# Patient Record
Sex: FEMALE | Race: ASIAN | NOT HISPANIC OR LATINO
[De-identification: names, ages, dates, MRNs, and addresses within clinical notes are randomized per-mention and may not be internally consistent; named-entity substitution may affect disease eponyms.]

---

## 2017-02-27 ENCOUNTER — APPOINTMENT (OUTPATIENT)
Dept: CARDIOTHORACIC SURGERY | Facility: CLINIC | Age: 47
End: 2017-02-27

## 2017-02-27 VITALS
BODY MASS INDEX: 22.76 KG/M2 | RESPIRATION RATE: 15 BRPM | TEMPERATURE: 98.3 F | WEIGHT: 145 LBS | DIASTOLIC BLOOD PRESSURE: 90 MMHG | HEIGHT: 67 IN | OXYGEN SATURATION: 100 % | HEART RATE: 79 BPM | SYSTOLIC BLOOD PRESSURE: 148 MMHG

## 2017-02-27 VITALS — DIASTOLIC BLOOD PRESSURE: 92 MMHG | SYSTOLIC BLOOD PRESSURE: 150 MMHG

## 2017-02-27 DIAGNOSIS — Z86.79 PERSONAL HISTORY OF OTHER DISEASES OF THE CIRCULATORY SYSTEM: ICD-10-CM

## 2017-02-27 DIAGNOSIS — Z82.49 FAMILY HISTORY OF ISCHEMIC HEART DISEASE AND OTHER DISEASES OF THE CIRCULATORY SYSTEM: ICD-10-CM

## 2017-02-27 DIAGNOSIS — I34.2 NONRHEUMATIC MITRAL (VALVE) STENOSIS: ICD-10-CM

## 2017-02-27 DIAGNOSIS — Z80.9 FAMILY HISTORY OF MALIGNANT NEOPLASM, UNSPECIFIED: ICD-10-CM

## 2017-02-27 DIAGNOSIS — Z86.69 PERSONAL HISTORY OF OTHER DISEASES OF THE NERVOUS SYSTEM AND SENSE ORGANS: ICD-10-CM

## 2017-02-27 DIAGNOSIS — Z78.9 OTHER SPECIFIED HEALTH STATUS: ICD-10-CM

## 2017-02-27 DIAGNOSIS — Z82.61 FAMILY HISTORY OF ARTHRITIS: ICD-10-CM

## 2017-11-22 ENCOUNTER — APPOINTMENT (OUTPATIENT)
Dept: CARDIOTHORACIC SURGERY | Facility: CLINIC | Age: 47
End: 2017-11-22
Payer: COMMERCIAL

## 2017-11-29 ENCOUNTER — APPOINTMENT (OUTPATIENT)
Dept: CARDIOTHORACIC SURGERY | Facility: CLINIC | Age: 47
End: 2017-11-29
Payer: COMMERCIAL

## 2017-11-29 VITALS
TEMPERATURE: 98.2 F | RESPIRATION RATE: 13 BRPM | OXYGEN SATURATION: 99 % | HEART RATE: 100 BPM | HEIGHT: 55 IN | SYSTOLIC BLOOD PRESSURE: 166 MMHG | DIASTOLIC BLOOD PRESSURE: 69 MMHG | BODY MASS INDEX: 771.44 KG/M2

## 2017-11-29 VITALS — BODY MASS INDEX: 24.8 KG/M2 | WEIGHT: 158 LBS | HEIGHT: 67 IN

## 2017-11-29 DIAGNOSIS — I05.0 RHEUMATIC MITRAL STENOSIS: ICD-10-CM

## 2017-11-29 PROCEDURE — 99215 OFFICE O/P EST HI 40 MIN: CPT

## 2018-01-22 ENCOUNTER — INPATIENT (INPATIENT)
Facility: HOSPITAL | Age: 48
LOS: 5 days | Discharge: HOME | DRG: 217 | End: 2018-01-28
Attending: THORACIC SURGERY (CARDIOTHORACIC VASCULAR SURGERY) | Admitting: INTERNAL MEDICINE
Payer: COMMERCIAL

## 2018-01-22 VITALS
SYSTOLIC BLOOD PRESSURE: 144 MMHG | HEIGHT: 67 IN | DIASTOLIC BLOOD PRESSURE: 87 MMHG | HEART RATE: 100 BPM | WEIGHT: 138.01 LBS | RESPIRATION RATE: 16 BRPM | OXYGEN SATURATION: 100 %

## 2018-01-22 DIAGNOSIS — I05.1 RHEUMATIC MITRAL INSUFFICIENCY: ICD-10-CM

## 2018-01-22 DIAGNOSIS — Z98.890 OTHER SPECIFIED POSTPROCEDURAL STATES: Chronic | ICD-10-CM

## 2018-01-22 DIAGNOSIS — Z98.89 OTHER SPECIFIED POSTPROCEDURAL STATES: Chronic | ICD-10-CM

## 2018-01-22 DIAGNOSIS — Z01.810 ENCOUNTER FOR PREPROCEDURAL CARDIOVASCULAR EXAMINATION: ICD-10-CM

## 2018-01-22 LAB
ALBUMIN SERPL ELPH-MCNC: 4.4 G/DL — SIGNIFICANT CHANGE UP (ref 3.3–5)
ALP SERPL-CCNC: 73 U/L — SIGNIFICANT CHANGE UP (ref 40–120)
ALT FLD-CCNC: 20 U/L RC — SIGNIFICANT CHANGE UP (ref 10–45)
ANION GAP SERPL CALC-SCNC: 14 MMOL/L — SIGNIFICANT CHANGE UP (ref 5–17)
APTT BLD: 27.8 SEC — SIGNIFICANT CHANGE UP (ref 27.5–37.4)
AST SERPL-CCNC: 26 U/L — SIGNIFICANT CHANGE UP (ref 10–40)
BASOPHILS # BLD AUTO: 0 K/UL — SIGNIFICANT CHANGE UP (ref 0–0.2)
BASOPHILS NFR BLD AUTO: 0 % — SIGNIFICANT CHANGE UP (ref 0–2)
BILIRUB SERPL-MCNC: 0.6 MG/DL — SIGNIFICANT CHANGE UP (ref 0.2–1.2)
BLD GP AB SCN SERPL QL: NEGATIVE — SIGNIFICANT CHANGE UP
BUN SERPL-MCNC: 12 MG/DL — SIGNIFICANT CHANGE UP (ref 7–23)
CALCIUM SERPL-MCNC: 9.6 MG/DL — SIGNIFICANT CHANGE UP (ref 8.4–10.5)
CHLORIDE SERPL-SCNC: 102 MMOL/L — SIGNIFICANT CHANGE UP (ref 96–108)
CHOLEST SERPL-MCNC: 231 MG/DL — HIGH (ref 10–199)
CO2 SERPL-SCNC: 22 MMOL/L — SIGNIFICANT CHANGE UP (ref 22–31)
CREAT SERPL-MCNC: 0.89 MG/DL — SIGNIFICANT CHANGE UP (ref 0.5–1.3)
EOSINOPHIL # BLD AUTO: 0 K/UL — SIGNIFICANT CHANGE UP (ref 0–0.5)
EOSINOPHIL NFR BLD AUTO: 0.1 % — SIGNIFICANT CHANGE UP (ref 0–6)
GLUCOSE SERPL-MCNC: 153 MG/DL — HIGH (ref 70–99)
HBA1C BLD-MCNC: 4.8 % — SIGNIFICANT CHANGE UP (ref 4–5.6)
HCG SERPL-ACNC: <2 MIU/ML — LOW (ref 5–24)
HCT VFR BLD CALC: 40.2 % — SIGNIFICANT CHANGE UP (ref 34.5–45)
HCT VFR BLD CALC: 45.7 % — HIGH (ref 34.5–45)
HDLC SERPL-MCNC: 96 MG/DL — SIGNIFICANT CHANGE UP (ref 40–125)
HGB BLD-MCNC: 13.6 G/DL — SIGNIFICANT CHANGE UP (ref 11.5–15.5)
HGB BLD-MCNC: 15.2 G/DL — SIGNIFICANT CHANGE UP (ref 11.5–15.5)
INR BLD: 0.96 RATIO — SIGNIFICANT CHANGE UP (ref 0.88–1.16)
LIPID PNL WITH DIRECT LDL SERPL: 122 MG/DL — SIGNIFICANT CHANGE UP
LYMPHOCYTES # BLD AUTO: 0.6 K/UL — LOW (ref 1–3.3)
LYMPHOCYTES # BLD AUTO: 5.6 % — LOW (ref 13–44)
MCHC RBC-ENTMCNC: 27.9 PG — SIGNIFICANT CHANGE UP (ref 27–34)
MCHC RBC-ENTMCNC: 28.3 PG — SIGNIFICANT CHANGE UP (ref 27–34)
MCHC RBC-ENTMCNC: 33.3 GM/DL — SIGNIFICANT CHANGE UP (ref 32–36)
MCHC RBC-ENTMCNC: 33.8 GM/DL — SIGNIFICANT CHANGE UP (ref 32–36)
MCV RBC AUTO: 83.7 FL — SIGNIFICANT CHANGE UP (ref 80–100)
MCV RBC AUTO: 83.7 FL — SIGNIFICANT CHANGE UP (ref 80–100)
MONOCYTES # BLD AUTO: 0.1 K/UL — SIGNIFICANT CHANGE UP (ref 0–0.9)
MONOCYTES NFR BLD AUTO: 0.5 % — LOW (ref 2–14)
NEUTROPHILS # BLD AUTO: 9.5 K/UL — HIGH (ref 1.8–7.4)
NEUTROPHILS NFR BLD AUTO: 93.8 % — HIGH (ref 43–77)
NT-PROBNP SERPL-SCNC: 423 PG/ML — HIGH (ref 0–300)
PLATELET # BLD AUTO: 207 K/UL — SIGNIFICANT CHANGE UP (ref 150–400)
PLATELET # BLD AUTO: 230 K/UL — SIGNIFICANT CHANGE UP (ref 150–400)
POTASSIUM SERPL-MCNC: 4.6 MMOL/L — SIGNIFICANT CHANGE UP (ref 3.5–5.3)
POTASSIUM SERPL-SCNC: 4.6 MMOL/L — SIGNIFICANT CHANGE UP (ref 3.5–5.3)
PROT SERPL-MCNC: 8.4 G/DL — HIGH (ref 6–8.3)
PROTHROM AB SERPL-ACNC: 10.4 SEC — SIGNIFICANT CHANGE UP (ref 9.8–12.7)
RBC # BLD: 4.8 M/UL — SIGNIFICANT CHANGE UP (ref 3.8–5.2)
RBC # BLD: 5.46 M/UL — HIGH (ref 3.8–5.2)
RBC # FLD: 12.3 % — SIGNIFICANT CHANGE UP (ref 10.3–14.5)
RBC # FLD: 12.3 % — SIGNIFICANT CHANGE UP (ref 10.3–14.5)
RH IG SCN BLD-IMP: POSITIVE — SIGNIFICANT CHANGE UP
SODIUM SERPL-SCNC: 138 MMOL/L — SIGNIFICANT CHANGE UP (ref 135–145)
T3 SERPL-MCNC: 65 NG/DL — LOW (ref 80–200)
T4 AB SER-ACNC: 6.9 UG/DL — SIGNIFICANT CHANGE UP (ref 4.6–12)
TOTAL CHOLESTEROL/HDL RATIO MEASUREMENT: 2.4 RATIO — LOW (ref 3.3–7.1)
TRIGL SERPL-MCNC: 66 MG/DL — SIGNIFICANT CHANGE UP (ref 10–149)
TSH SERPL-MCNC: 0.79 UIU/ML — SIGNIFICANT CHANGE UP (ref 0.27–4.2)
WBC # BLD: 10.2 K/UL — SIGNIFICANT CHANGE UP (ref 3.8–10.5)
WBC # BLD: 6 K/UL — SIGNIFICANT CHANGE UP (ref 3.8–10.5)
WBC # FLD AUTO: 10.2 K/UL — SIGNIFICANT CHANGE UP (ref 3.8–10.5)
WBC # FLD AUTO: 6 K/UL — SIGNIFICANT CHANGE UP (ref 3.8–10.5)

## 2018-01-22 PROCEDURE — 93010 ELECTROCARDIOGRAM REPORT: CPT

## 2018-01-22 PROCEDURE — 71045 X-RAY EXAM CHEST 1 VIEW: CPT | Mod: 26

## 2018-01-22 PROCEDURE — 93306 TTE W/DOPPLER COMPLETE: CPT | Mod: 26

## 2018-01-22 RX ORDER — CHLORHEXIDINE GLUCONATE 213 G/1000ML
1 SOLUTION TOPICAL ONCE
Qty: 0 | Refills: 0 | Status: COMPLETED | OUTPATIENT
Start: 2018-01-22 | End: 2018-01-22

## 2018-01-22 RX ORDER — CHLORHEXIDINE GLUCONATE 213 G/1000ML
30 SOLUTION TOPICAL ONCE
Qty: 0 | Refills: 0 | Status: COMPLETED | OUTPATIENT
Start: 2018-01-22 | End: 2018-01-23

## 2018-01-22 RX ORDER — DIPHENHYDRAMINE HCL 50 MG
50 CAPSULE ORAL ONCE
Qty: 0 | Refills: 0 | Status: COMPLETED | OUTPATIENT
Start: 2018-01-22 | End: 2018-01-22

## 2018-01-22 RX ORDER — CEFUROXIME AXETIL 250 MG
1500 TABLET ORAL ONCE
Qty: 0 | Refills: 0 | Status: DISCONTINUED | OUTPATIENT
Start: 2018-01-22 | End: 2018-01-23

## 2018-01-22 RX ORDER — DIPHENHYDRAMINE HCL 50 MG
25 CAPSULE ORAL ONCE
Qty: 0 | Refills: 0 | Status: COMPLETED | OUTPATIENT
Start: 2018-01-22 | End: 2018-01-22

## 2018-01-22 RX ADMIN — Medication 50 MILLIGRAM(S): at 09:04

## 2018-01-22 RX ADMIN — CHLORHEXIDINE GLUCONATE 1 APPLICATION(S): 213 SOLUTION TOPICAL at 21:30

## 2018-01-22 RX ADMIN — Medication 25 MILLIGRAM(S): at 21:46

## 2018-01-22 NOTE — PROGRESS NOTE ADULT - SUBJECTIVE AND OBJECTIVE BOX
Cardiac Surgery Pre-op Note:     Surgeon: Dr. Vieyra    Procedure: 1/23/18 re-op MVR    HPI:  48 y/o female with PMHx significant for Rheumatic fever & MR when she was teenager, s/p MVR in 1991 presents today for coronary angiogram for presurgical clearance for scheduled MVR on 1/23/18 with Dr. Vieyra. Patient states she is asymptomatic, denies chest pain, PALACIOS, SOB, dizziness / syncope. Patient had a routine stress test which was not well tolerated. Patient was seen and evaluated by Dr. Vieyra and scheduled for MVR on 1/23/18. (patient is getting admitted after cath for MVR). pt pre treated for IV dye allergy.  Card: Dr. Ehsan Santana     PAST MEDICAL & SURGICAL HISTORY:  TIA (transient ischemic attack): on Aspirin  Rheumatic fever: as a teen, s/p mitral valve repair 1991  H/O right knee surgery  S/P mitral valve repair: 1991    MEDICATIONS  (STANDING):  cefuroxime  IVPB 1500 milliGRAM(s) IV Intermittent once  chlorhexidine 0.12% Liquid 30 milliLiter(s) Swish and Spit once  chlorhexidine 4% Liquid 1 Application(s) Topical once    Vital Signs Last 24 Hrs  HR: 100 (01-22-18 @ 08:00) (100 - 100)  BP: 144/87 (01-22-18 @ 08:00) (144/87 - 144/87)  RR: 16 (01-22-18 @ 08:00) (16 - 16)  SpO2: 100% (01-22-18 @ 08:00) (100% - 100%)  RA          Height (cm): 170.18    Weight (kg): 62.6    BMI (kg/m2): 21.6    (22 Jan 2018 08:00)    Labs:                        15.2   6.0   )-----------( 230      ( 22 Jan 2018 08:15 )             45.7     138  |  102  |  12  ----------------------------<  153<H>  4.6   |  22  |  0.89    TPro  8.4<H>  /  Alb  4.4  /  TBili  0.6  /  DBili  x   /  AST  26  /  ALT  20  /  AlkPhos  73  01-22  PT/INR/PTT - ( 22 Jan 2018 08:15 )   PT: 10.4 sec;   INR: 0.96 ratio    PTT:27.8 sec  ABO Interpretation: A (01-22 @ 08:19)  Thyroid Panel: 01-22 @ 10:29/0.79/ 6.9/65  MRSA:  / MSSA: pending    CXR: pending    PFT's: pending    Echocardiogram: pending    Cardiac catheterization 1/22: normal coronaries    PHYSICAL EXAM:  Neuro: A&Ox3, NAD  Pulm: B/L BS CTA  CV: RRR,+S1S2  Abd: Soft, NT/ND +[id number]  Ext: B/L LE no edema, +PP, no calf tenderness

## 2018-01-22 NOTE — PROGRESS NOTE ADULT - PROBLEM SELECTOR PLAN 1
C/W pre-op cardiac surgery workup.  NPO p MN, chlorhexidine shower.  OR Tuesday 1/23 1st case with Dr. Vieyra.

## 2018-01-22 NOTE — H&P CARDIOLOGY - PMH
Rheumatic fever  as a teen, s/p mitral valve repair 1991 Rheumatic fever  as a teen, s/p mitral valve repair 1991  TIA (transient ischemic attack)  on Aspirin

## 2018-01-22 NOTE — H&P CARDIOLOGY - HISTORY OF PRESENT ILLNESS
46 y/o  female with PMHx of Rheumatic fever s/p MVR in 1991 presents today for coronary angiogram for presurgical clearance of scheduled MVR on 1/23/18 with Dr. Vieyra. Patient states she is asymptomatic, denies chest pain, PALACIOS, SOB, dizziness / syncope. Patient had a routine stress test which was not well tolerated. 46 y/o  female with PMHx significant for Rheumatic fever when she was teenager, s/p MVR in 1991 presents today for coronary angiogram for presurgical clearance for scheduled MVR on 1/23/18 with Dr. Vieyra. Patient states she is asymptomatic, denies chest pain, PALACIOS, SOB, dizziness / syncope. Patient had a routine stress test which was not well tolerated. Patient was seen and evaluated by Dr. Vieyra and scheduled for MVR on 1/23/18. (patient is getting admitted after cath for MVR)  Card: Dr. Ehsan Santana 46 y/o  female with PMHx significant for Rheumatic fever when she was teenager, s/p MVR in 1991 presents today for coronary angiogram for presurgical clearance for scheduled MVR on 1/23/18 with Dr. Vieyra. Patient states she is asymptomatic, denies chest pain, PALACIOS, SOB, dizziness / syncope. Patient had a routine stress test which was not well tolerated. Patient was seen and evaluated by Dr. Vieyra and scheduled for MVR on 1/23/18. (patient is getting admitted after cath for MVR).  pt pre treated for IV dye allergy.  Card: Dr. Ehsan Santana

## 2018-01-23 ENCOUNTER — RESULT REVIEW (OUTPATIENT)
Age: 48
End: 2018-01-23

## 2018-01-23 ENCOUNTER — APPOINTMENT (OUTPATIENT)
Dept: CARDIOTHORACIC SURGERY | Facility: HOSPITAL | Age: 48
End: 2018-01-23
Payer: COMMERCIAL

## 2018-01-23 LAB
ALBUMIN SERPL ELPH-MCNC: 2.8 G/DL — LOW (ref 3.3–5)
ALP SERPL-CCNC: 40 U/L — SIGNIFICANT CHANGE UP (ref 40–120)
ALT FLD-CCNC: 17 U/L RC — SIGNIFICANT CHANGE UP (ref 10–45)
ANION GAP SERPL CALC-SCNC: 15 MMOL/L — SIGNIFICANT CHANGE UP (ref 5–17)
APPEARANCE UR: ABNORMAL
APTT BLD: 26.6 SEC — LOW (ref 27.5–37.4)
AST SERPL-CCNC: 47 U/L — HIGH (ref 10–40)
BACTERIA # UR AUTO: ABNORMAL
BASOPHILS # BLD AUTO: 0 K/UL — SIGNIFICANT CHANGE UP (ref 0–0.2)
BASOPHILS NFR BLD AUTO: 0.1 % — SIGNIFICANT CHANGE UP (ref 0–2)
BILIRUB SERPL-MCNC: 0.9 MG/DL — SIGNIFICANT CHANGE UP (ref 0.2–1.2)
BILIRUB UR-MCNC: NEGATIVE — SIGNIFICANT CHANGE UP
BUN SERPL-MCNC: 13 MG/DL — SIGNIFICANT CHANGE UP (ref 7–23)
CALCIUM SERPL-MCNC: 8 MG/DL — LOW (ref 8.4–10.5)
CHLORIDE SERPL-SCNC: 107 MMOL/L — SIGNIFICANT CHANGE UP (ref 96–108)
CK MB BLD-MCNC: 15.8 % — HIGH (ref 0–3.5)
CK MB CFR SERPL CALC: 48 NG/ML — HIGH (ref 0–3.8)
CK SERPL-CCNC: 304 U/L — HIGH (ref 25–170)
CO2 SERPL-SCNC: 18 MMOL/L — LOW (ref 22–31)
COLOR SPEC: ABNORMAL
COMMENT - URINE: SIGNIFICANT CHANGE UP
CREAT SERPL-MCNC: 0.93 MG/DL — SIGNIFICANT CHANGE UP (ref 0.5–1.3)
DIFF PNL FLD: ABNORMAL
EOSINOPHIL # BLD AUTO: 0 K/UL — SIGNIFICANT CHANGE UP (ref 0–0.5)
EOSINOPHIL NFR BLD AUTO: 0.1 % — SIGNIFICANT CHANGE UP (ref 0–6)
EPI CELLS # UR: 27 /HPF — HIGH (ref 0–5)
FIBRINOGEN PPP-MCNC: 222 MG/DL — LOW (ref 310–510)
GAS PNL BLDA: SIGNIFICANT CHANGE UP
GLUCOSE BLDC GLUCOMTR-MCNC: 128 MG/DL — HIGH (ref 70–99)
GLUCOSE BLDC GLUCOMTR-MCNC: 158 MG/DL — HIGH (ref 70–99)
GLUCOSE SERPL-MCNC: 174 MG/DL — HIGH (ref 70–99)
GLUCOSE UR QL: NEGATIVE MG/DL — SIGNIFICANT CHANGE UP
HCT VFR BLD CALC: 33.2 % — LOW (ref 34.5–45)
HGB BLD-MCNC: 11.4 G/DL — LOW (ref 11.5–15.5)
HYALINE CASTS # UR AUTO: 1 /LPF — SIGNIFICANT CHANGE UP (ref 0–7)
INR BLD: 1.17 RATIO — HIGH (ref 0.88–1.16)
KETONES UR-MCNC: ABNORMAL
LEUKOCYTE ESTERASE UR-ACNC: ABNORMAL
LYMPHOCYTES # BLD AUTO: 0.9 K/UL — LOW (ref 1–3.3)
LYMPHOCYTES # BLD AUTO: 5.6 % — LOW (ref 13–44)
MCHC RBC-ENTMCNC: 28.8 PG — SIGNIFICANT CHANGE UP (ref 27–34)
MCHC RBC-ENTMCNC: 34.5 GM/DL — SIGNIFICANT CHANGE UP (ref 32–36)
MCV RBC AUTO: 83.7 FL — SIGNIFICANT CHANGE UP (ref 80–100)
MONOCYTES # BLD AUTO: 0.7 K/UL — SIGNIFICANT CHANGE UP (ref 0–0.9)
MONOCYTES NFR BLD AUTO: 4.7 % — SIGNIFICANT CHANGE UP (ref 2–14)
MRSA PCR RESULT.: SIGNIFICANT CHANGE UP
NEUTROPHILS # BLD AUTO: 13.7 K/UL — HIGH (ref 1.8–7.4)
NEUTROPHILS NFR BLD AUTO: 89.4 % — HIGH (ref 43–77)
NITRITE UR-MCNC: NEGATIVE — SIGNIFICANT CHANGE UP
PH UR: 6 — SIGNIFICANT CHANGE UP (ref 5–8)
PLATELET # BLD AUTO: 118 K/UL — LOW (ref 150–400)
POTASSIUM SERPL-MCNC: 4.6 MMOL/L — SIGNIFICANT CHANGE UP (ref 3.5–5.3)
POTASSIUM SERPL-SCNC: 4.6 MMOL/L — SIGNIFICANT CHANGE UP (ref 3.5–5.3)
PROT SERPL-MCNC: 5 G/DL — LOW (ref 6–8.3)
PROT UR-MCNC: 30 MG/DL
PROTHROM AB SERPL-ACNC: 12.7 SEC — SIGNIFICANT CHANGE UP (ref 9.8–12.7)
RBC # BLD: 3.97 M/UL — SIGNIFICANT CHANGE UP (ref 3.8–5.2)
RBC # FLD: 12.5 % — SIGNIFICANT CHANGE UP (ref 10.3–14.5)
RBC CASTS # UR COMP ASSIST: 5 /HPF — HIGH (ref 0–4)
S AUREUS DNA NOSE QL NAA+PROBE: DETECTED
SODIUM SERPL-SCNC: 140 MMOL/L — SIGNIFICANT CHANGE UP (ref 135–145)
SP GR SPEC: 1.04 — HIGH (ref 1.01–1.02)
TROPONIN T SERPL-MCNC: 0.42 NG/ML — HIGH (ref 0–0.06)
UROBILINOGEN FLD QL: NEGATIVE MG/DL — SIGNIFICANT CHANGE UP
WBC # BLD: 15.4 K/UL — HIGH (ref 3.8–10.5)
WBC # FLD AUTO: 15.4 K/UL — HIGH (ref 3.8–10.5)
WBC UR QL: 12 /HPF — HIGH (ref 0–5)

## 2018-01-23 PROCEDURE — 33530 CORONARY ARTERY BYPASS/REOP: CPT

## 2018-01-23 PROCEDURE — 93010 ELECTROCARDIOGRAM REPORT: CPT

## 2018-01-23 PROCEDURE — 33530 CORONARY ARTERY BYPASS/REOP: CPT | Mod: AS

## 2018-01-23 PROCEDURE — 94010 BREATHING CAPACITY TEST: CPT | Mod: 26

## 2018-01-23 PROCEDURE — 71045 X-RAY EXAM CHEST 1 VIEW: CPT | Mod: 26

## 2018-01-23 PROCEDURE — 88300 SURGICAL PATH GROSS: CPT | Mod: 26,59

## 2018-01-23 PROCEDURE — 33430 REPLACEMENT OF MITRAL VALVE: CPT | Mod: AS

## 2018-01-23 PROCEDURE — 88305 TISSUE EXAM BY PATHOLOGIST: CPT | Mod: 26

## 2018-01-23 PROCEDURE — 33430 REPLACEMENT OF MITRAL VALVE: CPT

## 2018-01-23 RX ORDER — INSULIN HUMAN 100 [IU]/ML
2 INJECTION, SOLUTION SUBCUTANEOUS
Qty: 100 | Refills: 0 | Status: DISCONTINUED | OUTPATIENT
Start: 2018-01-23 | End: 2018-01-24

## 2018-01-23 RX ORDER — FENTANYL CITRATE 50 UG/ML
25 INJECTION INTRAVENOUS ONCE
Qty: 0 | Refills: 0 | Status: DISCONTINUED | OUTPATIENT
Start: 2018-01-23 | End: 2018-01-24

## 2018-01-23 RX ORDER — ALBUMIN HUMAN 25 %
250 VIAL (ML) INTRAVENOUS ONCE
Qty: 0 | Refills: 0 | Status: COMPLETED | OUTPATIENT
Start: 2018-01-23 | End: 2018-01-23

## 2018-01-23 RX ORDER — POTASSIUM CHLORIDE 20 MEQ
10 PACKET (EA) ORAL
Qty: 0 | Refills: 0 | Status: DISCONTINUED | OUTPATIENT
Start: 2018-01-23 | End: 2018-01-24

## 2018-01-23 RX ORDER — SODIUM BICARBONATE 1 MEQ/ML
50 SYRINGE (ML) INTRAVENOUS ONCE
Qty: 0 | Refills: 0 | Status: COMPLETED | OUTPATIENT
Start: 2018-01-23 | End: 2018-01-23

## 2018-01-23 RX ORDER — MEPERIDINE HYDROCHLORIDE 50 MG/ML
25 INJECTION INTRAMUSCULAR; INTRAVENOUS; SUBCUTANEOUS ONCE
Qty: 0 | Refills: 0 | Status: DISCONTINUED | OUTPATIENT
Start: 2018-01-23 | End: 2018-01-24

## 2018-01-23 RX ORDER — FENTANYL CITRATE 50 UG/ML
25 INJECTION INTRAVENOUS ONCE
Qty: 0 | Refills: 0 | Status: DISCONTINUED | OUTPATIENT
Start: 2018-01-23 | End: 2018-01-23

## 2018-01-23 RX ORDER — PANTOPRAZOLE SODIUM 20 MG/1
40 TABLET, DELAYED RELEASE ORAL DAILY
Qty: 0 | Refills: 0 | Status: DISCONTINUED | OUTPATIENT
Start: 2018-01-23 | End: 2018-01-24

## 2018-01-23 RX ORDER — MUPIROCIN 20 MG/G
1 OINTMENT TOPICAL
Qty: 0 | Refills: 0 | Status: COMPLETED | OUTPATIENT
Start: 2018-01-23 | End: 2018-01-28

## 2018-01-23 RX ORDER — DEXMEDETOMIDINE HYDROCHLORIDE IN 0.9% SODIUM CHLORIDE 4 UG/ML
0.7 INJECTION INTRAVENOUS
Qty: 200 | Refills: 0 | Status: DISCONTINUED | OUTPATIENT
Start: 2018-01-23 | End: 2018-01-24

## 2018-01-23 RX ORDER — ASPIRIN/CALCIUM CARB/MAGNESIUM 324 MG
325 TABLET ORAL DAILY
Qty: 0 | Refills: 0 | Status: DISCONTINUED | OUTPATIENT
Start: 2018-01-24 | End: 2018-01-28

## 2018-01-23 RX ORDER — DOCUSATE SODIUM 100 MG
100 CAPSULE ORAL THREE TIMES A DAY
Qty: 0 | Refills: 0 | Status: DISCONTINUED | OUTPATIENT
Start: 2018-01-23 | End: 2018-01-28

## 2018-01-23 RX ORDER — KETOROLAC TROMETHAMINE 30 MG/ML
15 SYRINGE (ML) INJECTION ONCE
Qty: 0 | Refills: 0 | Status: DISCONTINUED | OUTPATIENT
Start: 2018-01-23 | End: 2018-01-23

## 2018-01-23 RX ORDER — MILRINONE LACTATE 1 MG/ML
0.2 INJECTION, SOLUTION INTRAVENOUS
Qty: 20 | Refills: 0 | Status: DISCONTINUED | OUTPATIENT
Start: 2018-01-23 | End: 2018-01-24

## 2018-01-23 RX ORDER — ONDANSETRON 8 MG/1
4 TABLET, FILM COATED ORAL ONCE
Qty: 0 | Refills: 0 | Status: COMPLETED | OUTPATIENT
Start: 2018-01-23 | End: 2018-01-24

## 2018-01-23 RX ORDER — CEFUROXIME AXETIL 250 MG
1500 TABLET ORAL EVERY 8 HOURS
Qty: 0 | Refills: 0 | Status: COMPLETED | OUTPATIENT
Start: 2018-01-23 | End: 2018-01-25

## 2018-01-23 RX ORDER — POTASSIUM CHLORIDE 20 MEQ
10 PACKET (EA) ORAL
Qty: 0 | Refills: 0 | Status: COMPLETED | OUTPATIENT
Start: 2018-01-23 | End: 2018-01-23

## 2018-01-23 RX ORDER — DEXTROSE 50 % IN WATER 50 %
50 SYRINGE (ML) INTRAVENOUS
Qty: 0 | Refills: 0 | Status: DISCONTINUED | OUTPATIENT
Start: 2018-01-23 | End: 2018-01-28

## 2018-01-23 RX ORDER — POTASSIUM CHLORIDE 20 MEQ
10 PACKET (EA) ORAL ONCE
Qty: 0 | Refills: 0 | Status: DISCONTINUED | OUTPATIENT
Start: 2018-01-23 | End: 2018-01-24

## 2018-01-23 RX ORDER — NICARDIPINE HYDROCHLORIDE 30 MG/1
3 CAPSULE, EXTENDED RELEASE ORAL
Qty: 40 | Refills: 0 | Status: DISCONTINUED | OUTPATIENT
Start: 2018-01-23 | End: 2018-01-24

## 2018-01-23 RX ORDER — HYDROMORPHONE HYDROCHLORIDE 2 MG/ML
1 INJECTION INTRAMUSCULAR; INTRAVENOUS; SUBCUTANEOUS ONCE
Qty: 0 | Refills: 0 | Status: DISCONTINUED | OUTPATIENT
Start: 2018-01-23 | End: 2018-01-23

## 2018-01-23 RX ORDER — DEXTROSE 50 % IN WATER 50 %
25 SYRINGE (ML) INTRAVENOUS
Qty: 0 | Refills: 0 | Status: DISCONTINUED | OUTPATIENT
Start: 2018-01-23 | End: 2018-01-28

## 2018-01-23 RX ORDER — HYDROMORPHONE HYDROCHLORIDE 2 MG/ML
0.5 INJECTION INTRAMUSCULAR; INTRAVENOUS; SUBCUTANEOUS ONCE
Qty: 0 | Refills: 0 | Status: DISCONTINUED | OUTPATIENT
Start: 2018-01-23 | End: 2018-01-23

## 2018-01-23 RX ADMIN — DEXMEDETOMIDINE HYDROCHLORIDE IN 0.9% SODIUM CHLORIDE 10.96 MICROGRAM(S)/KG/HR: 4 INJECTION INTRAVENOUS at 15:45

## 2018-01-23 RX ADMIN — Medication 100 MILLIEQUIVALENT(S): at 15:55

## 2018-01-23 RX ADMIN — MILRINONE LACTATE 3.76 MICROGRAM(S)/KG/MIN: 1 INJECTION, SOLUTION INTRAVENOUS at 15:31

## 2018-01-23 RX ADMIN — FENTANYL CITRATE 25 MICROGRAM(S): 50 INJECTION INTRAVENOUS at 20:45

## 2018-01-23 RX ADMIN — Medication 50 MILLIEQUIVALENT(S): at 19:24

## 2018-01-23 RX ADMIN — CHLORHEXIDINE GLUCONATE 30 MILLILITER(S): 213 SOLUTION TOPICAL at 06:48

## 2018-01-23 RX ADMIN — Medication 100 MILLIEQUIVALENT(S): at 16:00

## 2018-01-23 RX ADMIN — MUPIROCIN 1 APPLICATION(S): 20 OINTMENT TOPICAL at 21:28

## 2018-01-23 RX ADMIN — HYDROMORPHONE HYDROCHLORIDE 1 MILLIGRAM(S): 2 INJECTION INTRAMUSCULAR; INTRAVENOUS; SUBCUTANEOUS at 16:11

## 2018-01-23 RX ADMIN — PANTOPRAZOLE SODIUM 40 MILLIGRAM(S): 20 TABLET, DELAYED RELEASE ORAL at 18:05

## 2018-01-23 RX ADMIN — FENTANYL CITRATE 25 MICROGRAM(S): 50 INJECTION INTRAVENOUS at 20:30

## 2018-01-23 RX ADMIN — INSULIN HUMAN 2 UNIT(S)/HR: 100 INJECTION, SOLUTION SUBCUTANEOUS at 18:00

## 2018-01-23 RX ADMIN — Medication 100 MILLIGRAM(S): at 16:06

## 2018-01-23 RX ADMIN — Medication 125 MILLILITER(S): at 19:26

## 2018-01-23 RX ADMIN — HYDROMORPHONE HYDROCHLORIDE 1 MILLIGRAM(S): 2 INJECTION INTRAMUSCULAR; INTRAVENOUS; SUBCUTANEOUS at 15:56

## 2018-01-23 RX ADMIN — Medication 125 MILLILITER(S): at 21:29

## 2018-01-23 RX ADMIN — Medication 125 MILLILITER(S): at 16:40

## 2018-01-23 NOTE — BRIEF OPERATIVE NOTE - COMMENTS
aortic cross clamp time - 116 minutes  EBL not accurate due to use of cardiotomy and cell saver suction

## 2018-01-23 NOTE — BRIEF OPERATIVE NOTE - PROCEDURE
<<-----Click on this checkbox to enter Procedure Mitral valve replacement  01/23/2018  re-op MVR (bioprosthetic)  Active  [id number]  Sternotomy, repeat  01/23/2018    Active  [id number]

## 2018-01-23 NOTE — AIRWAY REMOVAL NOTE  ADULT & PEDS - ARTIFICAL AIRWAY REMOVAL COMMENTS
Written order for extubation verified. The patient was identified by full name and birth date compared to the identification band. Present during the procedure was MARIIA Lange

## 2018-01-24 DIAGNOSIS — Z95.2 PRESENCE OF PROSTHETIC HEART VALVE: ICD-10-CM

## 2018-01-24 LAB
ALBUMIN SERPL ELPH-MCNC: 3.8 G/DL — SIGNIFICANT CHANGE UP (ref 3.3–5)
ALP SERPL-CCNC: 32 U/L — LOW (ref 40–120)
ALT FLD-CCNC: 15 U/L RC — SIGNIFICANT CHANGE UP (ref 10–45)
ANION GAP SERPL CALC-SCNC: 10 MMOL/L — SIGNIFICANT CHANGE UP (ref 5–17)
ANION GAP SERPL CALC-SCNC: 11 MMOL/L — SIGNIFICANT CHANGE UP (ref 5–17)
AST SERPL-CCNC: 53 U/L — HIGH (ref 10–40)
BILIRUB SERPL-MCNC: 0.9 MG/DL — SIGNIFICANT CHANGE UP (ref 0.2–1.2)
BUN SERPL-MCNC: 14 MG/DL — SIGNIFICANT CHANGE UP (ref 7–23)
BUN SERPL-MCNC: 15 MG/DL — SIGNIFICANT CHANGE UP (ref 7–23)
CALCIUM SERPL-MCNC: 8 MG/DL — LOW (ref 8.4–10.5)
CALCIUM SERPL-MCNC: 8.3 MG/DL — LOW (ref 8.4–10.5)
CHLORIDE SERPL-SCNC: 107 MMOL/L — SIGNIFICANT CHANGE UP (ref 96–108)
CHLORIDE SERPL-SCNC: 98 MMOL/L — SIGNIFICANT CHANGE UP (ref 96–108)
CO2 SERPL-SCNC: 23 MMOL/L — SIGNIFICANT CHANGE UP (ref 22–31)
CO2 SERPL-SCNC: 25 MMOL/L — SIGNIFICANT CHANGE UP (ref 22–31)
CREAT SERPL-MCNC: 0.89 MG/DL — SIGNIFICANT CHANGE UP (ref 0.5–1.3)
CREAT SERPL-MCNC: 1.06 MG/DL — SIGNIFICANT CHANGE UP (ref 0.5–1.3)
GAS PNL BLDA: SIGNIFICANT CHANGE UP
GAS PNL BLDA: SIGNIFICANT CHANGE UP
GLUCOSE BLDC GLUCOMTR-MCNC: 102 MG/DL — HIGH (ref 70–99)
GLUCOSE BLDC GLUCOMTR-MCNC: 104 MG/DL — HIGH (ref 70–99)
GLUCOSE BLDC GLUCOMTR-MCNC: 109 MG/DL — HIGH (ref 70–99)
GLUCOSE BLDC GLUCOMTR-MCNC: 121 MG/DL — HIGH (ref 70–99)
GLUCOSE SERPL-MCNC: 117 MG/DL — HIGH (ref 70–99)
GLUCOSE SERPL-MCNC: 150 MG/DL — HIGH (ref 70–99)
HCT VFR BLD CALC: 28.9 % — LOW (ref 34.5–45)
HGB BLD-MCNC: 9.8 G/DL — LOW (ref 11.5–15.5)
INR BLD: 1.05 RATIO — SIGNIFICANT CHANGE UP (ref 0.88–1.16)
MCHC RBC-ENTMCNC: 28.8 PG — SIGNIFICANT CHANGE UP (ref 27–34)
MCHC RBC-ENTMCNC: 33.8 GM/DL — SIGNIFICANT CHANGE UP (ref 32–36)
MCV RBC AUTO: 85 FL — SIGNIFICANT CHANGE UP (ref 80–100)
PLATELET # BLD AUTO: 100 K/UL — LOW (ref 150–400)
POTASSIUM SERPL-MCNC: 4.6 MMOL/L — SIGNIFICANT CHANGE UP (ref 3.5–5.3)
POTASSIUM SERPL-MCNC: 4.7 MMOL/L — SIGNIFICANT CHANGE UP (ref 3.5–5.3)
POTASSIUM SERPL-SCNC: 4.6 MMOL/L — SIGNIFICANT CHANGE UP (ref 3.5–5.3)
POTASSIUM SERPL-SCNC: 4.7 MMOL/L — SIGNIFICANT CHANGE UP (ref 3.5–5.3)
PROT SERPL-MCNC: 5.8 G/DL — LOW (ref 6–8.3)
PROTHROM AB SERPL-ACNC: 11.4 SEC — SIGNIFICANT CHANGE UP (ref 9.8–12.7)
RBC # BLD: 3.4 M/UL — LOW (ref 3.8–5.2)
RBC # FLD: 12.5 % — SIGNIFICANT CHANGE UP (ref 10.3–14.5)
SODIUM SERPL-SCNC: 133 MMOL/L — LOW (ref 135–145)
SODIUM SERPL-SCNC: 141 MMOL/L — SIGNIFICANT CHANGE UP (ref 135–145)
WBC # BLD: 14.3 K/UL — HIGH (ref 3.8–10.5)
WBC # FLD AUTO: 14.3 K/UL — HIGH (ref 3.8–10.5)

## 2018-01-24 PROCEDURE — 93010 ELECTROCARDIOGRAM REPORT: CPT

## 2018-01-24 PROCEDURE — 71045 X-RAY EXAM CHEST 1 VIEW: CPT | Mod: 26

## 2018-01-24 PROCEDURE — 99291 CRITICAL CARE FIRST HOUR: CPT

## 2018-01-24 RX ORDER — POLYETHYLENE GLYCOL 3350 17 G/17G
17 POWDER, FOR SOLUTION ORAL AT BEDTIME
Qty: 0 | Refills: 0 | Status: DISCONTINUED | OUTPATIENT
Start: 2018-01-24 | End: 2018-01-28

## 2018-01-24 RX ORDER — ACETAMINOPHEN 500 MG
650 TABLET ORAL EVERY 6 HOURS
Qty: 0 | Refills: 0 | Status: DISCONTINUED | OUTPATIENT
Start: 2018-01-24 | End: 2018-01-28

## 2018-01-24 RX ORDER — FENTANYL CITRATE 50 UG/ML
25 INJECTION INTRAVENOUS ONCE
Qty: 0 | Refills: 0 | Status: DISCONTINUED | OUTPATIENT
Start: 2018-01-24 | End: 2018-01-24

## 2018-01-24 RX ORDER — ATORVASTATIN CALCIUM 80 MG/1
40 TABLET, FILM COATED ORAL AT BEDTIME
Qty: 0 | Refills: 0 | Status: DISCONTINUED | OUTPATIENT
Start: 2018-01-24 | End: 2018-01-28

## 2018-01-24 RX ORDER — WARFARIN SODIUM 2.5 MG/1
5 TABLET ORAL ONCE
Qty: 0 | Refills: 0 | Status: COMPLETED | OUTPATIENT
Start: 2018-01-24 | End: 2018-01-24

## 2018-01-24 RX ORDER — METOPROLOL TARTRATE 50 MG
25 TABLET ORAL EVERY 12 HOURS
Qty: 0 | Refills: 0 | Status: DISCONTINUED | OUTPATIENT
Start: 2018-01-24 | End: 2018-01-27

## 2018-01-24 RX ORDER — LIDOCAINE 4 G/100G
1 CREAM TOPICAL DAILY
Qty: 0 | Refills: 0 | Status: DISCONTINUED | OUTPATIENT
Start: 2018-01-24 | End: 2018-01-28

## 2018-01-24 RX ORDER — PANTOPRAZOLE SODIUM 20 MG/1
40 TABLET, DELAYED RELEASE ORAL
Qty: 0 | Refills: 0 | Status: DISCONTINUED | OUTPATIENT
Start: 2018-01-24 | End: 2018-01-28

## 2018-01-24 RX ORDER — OXYCODONE HYDROCHLORIDE 5 MG/1
5 TABLET ORAL EVERY 6 HOURS
Qty: 0 | Refills: 0 | Status: DISCONTINUED | OUTPATIENT
Start: 2018-01-24 | End: 2018-01-27

## 2018-01-24 RX ORDER — ACETAMINOPHEN 500 MG
1000 TABLET ORAL ONCE
Qty: 0 | Refills: 0 | Status: COMPLETED | OUTPATIENT
Start: 2018-01-24 | End: 2018-01-24

## 2018-01-24 RX ORDER — ENOXAPARIN SODIUM 100 MG/ML
40 INJECTION SUBCUTANEOUS DAILY
Qty: 0 | Refills: 0 | Status: DISCONTINUED | OUTPATIENT
Start: 2018-01-24 | End: 2018-01-28

## 2018-01-24 RX ORDER — OXYCODONE HYDROCHLORIDE 5 MG/1
10 TABLET ORAL EVERY 8 HOURS
Qty: 0 | Refills: 0 | Status: DISCONTINUED | OUTPATIENT
Start: 2018-01-24 | End: 2018-01-27

## 2018-01-24 RX ORDER — SODIUM CHLORIDE 9 MG/ML
3 INJECTION INTRAMUSCULAR; INTRAVENOUS; SUBCUTANEOUS EVERY 8 HOURS
Qty: 0 | Refills: 0 | Status: DISCONTINUED | OUTPATIENT
Start: 2018-01-24 | End: 2018-01-28

## 2018-01-24 RX ADMIN — OXYCODONE HYDROCHLORIDE 5 MILLIGRAM(S): 5 TABLET ORAL at 22:45

## 2018-01-24 RX ADMIN — Medication 100 MILLIGRAM(S): at 12:49

## 2018-01-24 RX ADMIN — FENTANYL CITRATE 25 MICROGRAM(S): 50 INJECTION INTRAVENOUS at 23:30

## 2018-01-24 RX ADMIN — Medication 400 MILLIGRAM(S): at 04:00

## 2018-01-24 RX ADMIN — MUPIROCIN 1 APPLICATION(S): 20 OINTMENT TOPICAL at 06:41

## 2018-01-24 RX ADMIN — LIDOCAINE 1 PATCH: 4 CREAM TOPICAL at 20:01

## 2018-01-24 RX ADMIN — MUPIROCIN 1 APPLICATION(S): 20 OINTMENT TOPICAL at 17:04

## 2018-01-24 RX ADMIN — Medication 100 MILLIGRAM(S): at 00:04

## 2018-01-24 RX ADMIN — Medication 100 MILLIGRAM(S): at 17:04

## 2018-01-24 RX ADMIN — SODIUM CHLORIDE 3 MILLILITER(S): 9 INJECTION INTRAMUSCULAR; INTRAVENOUS; SUBCUTANEOUS at 22:08

## 2018-01-24 RX ADMIN — Medication 100 MILLIGRAM(S): at 07:49

## 2018-01-24 RX ADMIN — Medication 1000 MILLIGRAM(S): at 04:15

## 2018-01-24 RX ADMIN — OXYCODONE HYDROCHLORIDE 5 MILLIGRAM(S): 5 TABLET ORAL at 22:15

## 2018-01-24 RX ADMIN — ATORVASTATIN CALCIUM 40 MILLIGRAM(S): 80 TABLET, FILM COATED ORAL at 22:14

## 2018-01-24 RX ADMIN — Medication 650 MILLIGRAM(S): at 22:15

## 2018-01-24 RX ADMIN — Medication 100 MILLIGRAM(S): at 06:41

## 2018-01-24 RX ADMIN — Medication 15 MILLIGRAM(S): at 00:00

## 2018-01-24 RX ADMIN — PANTOPRAZOLE SODIUM 40 MILLIGRAM(S): 20 TABLET, DELAYED RELEASE ORAL at 07:49

## 2018-01-24 RX ADMIN — ONDANSETRON 4 MILLIGRAM(S): 8 TABLET, FILM COATED ORAL at 00:03

## 2018-01-24 RX ADMIN — OXYCODONE HYDROCHLORIDE 5 MILLIGRAM(S): 5 TABLET ORAL at 13:20

## 2018-01-24 RX ADMIN — OXYCODONE HYDROCHLORIDE 10 MILLIGRAM(S): 5 TABLET ORAL at 08:30

## 2018-01-24 RX ADMIN — OXYCODONE HYDROCHLORIDE 10 MILLIGRAM(S): 5 TABLET ORAL at 08:00

## 2018-01-24 RX ADMIN — Medication 25 MILLIGRAM(S): at 12:49

## 2018-01-24 RX ADMIN — Medication 650 MILLIGRAM(S): at 22:45

## 2018-01-24 RX ADMIN — ENOXAPARIN SODIUM 40 MILLIGRAM(S): 100 INJECTION SUBCUTANEOUS at 12:49

## 2018-01-24 RX ADMIN — WARFARIN SODIUM 5 MILLIGRAM(S): 2.5 TABLET ORAL at 22:15

## 2018-01-24 RX ADMIN — FENTANYL CITRATE 25 MICROGRAM(S): 50 INJECTION INTRAVENOUS at 17:00

## 2018-01-24 RX ADMIN — Medication 100 MILLIGRAM(S): at 22:14

## 2018-01-24 RX ADMIN — OXYCODONE HYDROCHLORIDE 5 MILLIGRAM(S): 5 TABLET ORAL at 12:50

## 2018-01-24 RX ADMIN — Medication 325 MILLIGRAM(S): at 12:49

## 2018-01-24 RX ADMIN — OXYCODONE HYDROCHLORIDE 10 MILLIGRAM(S): 5 TABLET ORAL at 20:40

## 2018-01-24 RX ADMIN — Medication 15 MILLIGRAM(S): at 00:15

## 2018-01-24 RX ADMIN — FENTANYL CITRATE 25 MICROGRAM(S): 50 INJECTION INTRAVENOUS at 17:15

## 2018-01-24 RX ADMIN — OXYCODONE HYDROCHLORIDE 10 MILLIGRAM(S): 5 TABLET ORAL at 20:09

## 2018-01-24 NOTE — CONSULT NOTE ADULT - SUBJECTIVE AND OBJECTIVE BOX
CHIEF COMPLAINT: Chest Pain    HPI:  48 y/o  female with PMHx significant for Rheumatic fever when she was teenager, s/p MVR in 1991 presents today for coronary angiogram for presurgical clearance for scheduled MVR on 1/23/18 with Dr. Vieyra. Patient states she is asymptomatic, denies chest pain, PALACIOS, SOB, dizziness / syncope. Patient had a routine stress test which was not well tolerated. Patient was seen and evaluated by Dr. Vieyra and scheduled for MVR on 1/23/18. (patient is getting admitted after cath for MVR). pt pre treated for IV dye allergy. S/p MVR (T) and sitting up in CT ICU. Off ventilator and drips.       PAST MEDICAL & SURGICAL HISTORY:  TIA (transient ischemic attack): on Aspirin  Rheumatic fever: as a teen, s/p mitral valve repair 1991  H/O right knee surgery  S/P mitral valve repair: 1991      Allergies    IV Contrast (Urticaria)    Intolerances      SOCIAL HISTORY    Smoking Hx:  ETOH Hx:  Marital Status:  Occupational Hx:    FAMILY HISTORY:  Family history of acute myocardial infarction (Father)      MEDICATIONS:  acetaminophen   Tablet. 650 milliGRAM(s) Oral every 6 hours PRN  aspirin enteric coated 325 milliGRAM(s) Oral daily  atorvastatin 40 milliGRAM(s) Oral at bedtime  cefuroxime  IVPB 1500 milliGRAM(s) IV Intermittent every 8 hours  dextrose 50% Injectable 50 milliLiter(s) IV Push every 15 minutes  dextrose 50% Injectable 25 milliLiter(s) IV Push every 15 minutes  docusate sodium 100 milliGRAM(s) Oral three times a day  enoxaparin Injectable 40 milliGRAM(s) SubCutaneous daily  mupirocin 2% Ointment 1 Application(s) Topical two times a day  oxyCODONE    IR 5 milliGRAM(s) Oral every 6 hours PRN  oxyCODONE    IR 10 milliGRAM(s) Oral every 8 hours PRN  pantoprazole    Tablet 40 milliGRAM(s) Oral before breakfast  polyethylene glycol 3350 17 Gram(s) Oral at bedtime  warfarin 5 milliGRAM(s) Oral once      REVIEW OF SYSTEMS:    CONSTITUTIONAL: No weakness, fevers or chills  EYES/ENT: No visual changes;  No vertigo or throat pain   NECK: No pain or stiffness  RESPIRATORY: No cough, wheezing, hemoptysis; No shortness of breath  CARDIOVASCULAR: No chest pain or palpitations  GASTROINTESTINAL: No abdominal or epigastric pain. No nausea, vomiting, or hematemesis; No diarrhea or constipation. No melena or hematochezia.  GENITOURINARY: No dysuria, frequency or hematuria  NEUROLOGICAL: No numbness or weakness  SKIN: No itching, burning, rashes, or lesions   All other review of systems is negative unless indicated above    Vital Signs Last 24 Hrs  T(C): 36.9 (24 Jan 2018 03:00), Max: 37.1 (23 Jan 2018 18:00)  T(F): 98.4 (24 Jan 2018 03:00), Max: 98.8 (23 Jan 2018 18:00)  HR: 63 (24 Jan 2018 06:00) (63 - 85)  BP: 145/67 (24 Jan 2018 06:00) (115/57 - 145/67)  BP(mean): 97 (24 Jan 2018 06:00) (80 - 97)  RR: 20 (24 Jan 2018 06:00) (12 - 28)  SpO2: 100% (24 Jan 2018 06:00) (99% - 100%)    I&O's Summary    23 Jan 2018 07:01  -  24 Jan 2018 07:00  --------------------------------------------------------  IN: 1906.4 mL / OUT: 1100 mL / NET: 806.4 mL        PHYSICAL EXAM:    Constitutional: NAD, awake and alert, well-developed  HEENT: PERR, EOMI  Neck: soft and supple, No LAD, No JVD  Respiratory: Breath sounds are clear bilaterally, No wheezing, rales or rhonchi  Cardiovascular: Regular rate and rhythm, normal S1 and S2,  no murmurs, gallops or rubs  Gastrointestinal: Bowel Sounds present, soft, nontender.   Extremities: No peripheral edema. No clubbing or cyanosis.  Vascular: 2+ peripheral pulses  Neurological: A/O x 3, no focal deficits  Musculoskeletal: no calf tenderness.  Skin: No rashes.      LABS: All Labs Reviewed:                        9.8    14.3  )-----------( 100      ( 24 Jan 2018 00:22 )             28.9                         11.4   15.4  )-----------( 118      ( 23 Jan 2018 15:44 )             33.2                         13.6   10.2  )-----------( 207      ( 22 Jan 2018 13:48 )             40.2     24 Jan 2018 00:22    141    |  107    |  14     ----------------------------<  150    4.6     |  23     |  0.89   23 Jan 2018 15:44    140    |  107    |  13     ----------------------------<  174    4.6     |  18     |  0.93   22 Jan 2018 08:14    138    |  102    |  12     ----------------------------<  153    4.6     |  22     |  0.89     Ca    8.0        24 Jan 2018 00:22  Ca    8.0        23 Jan 2018 15:44  Ca    9.6        22 Jan 2018 08:14    TPro  5.8    /  Alb  3.8    /  TBili  0.9    /  DBili  x      /  AST  53     /  ALT  15     /  AlkPhos  32     24 Jan 2018 00:22  TPro  5.0    /  Alb  2.8    /  TBili  0.9    /  DBili  x      /  AST  47     /  ALT  17     /  AlkPhos  40     23 Jan 2018 15:44  TPro  8.4    /  Alb  4.4    /  TBili  0.6    /  DBili  x      /  AST  26     /  ALT  20     /  AlkPhos  73     22 Jan 2018 08:14    PT/INR - ( 24 Jan 2018 00:22 )   PT: 11.4 sec;   INR: 1.05 ratio         PTT - ( 23 Jan 2018 15:44 )  PTT:26.6 sec  Blood Culture:     CARDIAC MARKERS:          RADIOLOGY/EKG:

## 2018-01-24 NOTE — PHYSICAL THERAPY INITIAL EVALUATION ADULT - DISCHARGE DISPOSITION, PT EVAL
home w/ assist/home w/ outpatient services/home/outpatient PT services for gait and balance, endurance training, home with assist as needed from spouse.

## 2018-01-24 NOTE — PHYSICAL THERAPY INITIAL EVALUATION ADULT - ADDITIONAL COMMENTS
as per pt, resides in a PH with spouse, 4 DEONDRE with railings, 11 stairs indoor with railings, PTA, patient (I) with amb, (I) with ADLs.

## 2018-01-24 NOTE — PROGRESS NOTE ADULT - SUBJECTIVE AND OBJECTIVE BOX
HPI:  48 y/o  female with 991 presents today for coronary angiogram for presurgical clearance for scheduled MVR on 1/23/18 with Dr. Vieyra. Patient states she is asymptomatic, denies chest pain, PALACIOS, SOB, dizziness / syncope. Patient had a routine stress test which was not well tolerated. Patient was seen and evaluated by Dr. Vieyra and scheduled for MVR on 1/23/18. (patient is getting admitted after cath for MVR).  pt pre treated for IV dye allergy.  Card: Dr. Ehsan Santana (22 Jan 2018 08:00)  SHARON MCKNIGHT  MRN#:  [id number]    The patient is a 47y Female with PMHx significant for Rheumatic fever s/p MVR in 1991 who was seen, evaluated, & examined with the CTICU staff on rounds, overnight and during the early morning hours with a multidisciplinary care plan formulated & implemented.  All available clinical, laboratory, radiographic, pharmacologic, and electrocardiographic data were reviewed & analyzed.      The patient was in the CTICU in critical condition secondary to persistent cardiopulmonary dysfunction, hemodynamically significant anemia, persistent cardiovascular dysfunction, acidosis, & hyperglycemia.      Respiratory status required supplemental oxygen, the following of ABG’s with A-line monitoring, continuous pulse oximetry monitoring, & an IV Propofol infusion for support & to evaluate for & prevent further decompensation secondary to persistent cardiopulmonary dysfunction.     Invasive hemodynamic monitoring with a central venous and arterial catheter were required for the following of serial CI’s/MVO2’s & continuous MAP/BP monitoring to ensure adequate cardiovascular support and to evaluate for & help prevent decompensation while receiving intermittent volume expansion, external epicardial pacing, blood transfusions, blood product transfusions, and an IV Primacor drip secondary to hemodynamically significant cardiovascular dysfunction and acute postoperative blood loss anemia.    Metabolic stability, uncontrolled type 2 diabetes-hyperglycemia, & infection prophylaxis required an IV regular Insulin drip & the following of serial glucose levels to help achieve & maintain euglycemia.      Patient required more than the usual postoperative critical care management and I provided 35 minutes of non-continuous care to the patient.  Discussed at length with the CTICU staff and helped coordinate care. SHARON MCKNIGHT  MRN#:  [id number]    The patient is a 47y Female with PMHx significant for Rheumatic fever s/p MVR in 1991 with subsequent MS/MR s/p MVR 1/23 who was seen, evaluated, & examined with the CTICU staff on rounds, overnight and during the early morning hours with a multidisciplinary care plan formulated & implemented.  All available clinical, laboratory, radiographic, pharmacologic, and electrocardiographic data were reviewed & analyzed.      The patient was in the CTICU in critical condition secondary to persistent cardiopulmonary dysfunction, hemodynamically significant anemia, persistent cardiovascular dysfunction, acidosis, & hyperglycemia.      Respiratory status required supplemental oxygen, the following of ABG’s with A-line monitoring, and continuous pulse oximetry monitoring for support & to evaluate for & prevent further decompensation secondary to persistent cardiopulmonary dysfunction.     Invasive hemodynamic monitoring with a central venous and arterial catheter were required continuous MAP/BP monitoring to ensure adequate cardiovascular support and to evaluate for & help prevent decompensation while receiving intermittent volume expansion, external epicardial pacing, blood transfusions, blood product transfusions, and an IV Primacor drip secondary to hemodynamically significant cardiovascular dysfunction and acute postoperative blood loss anemia.    Metabolic stability, uncontrolled type 2 diabetes-hyperglycemia, & infection prophylaxis required an IV regular Insulin drip & the following of serial glucose levels to help achieve & maintain euglycemia.      Patient required more than the usual postoperative critical care management and I provided 35 minutes of non-continuous care to the patient.  Discussed at length with the CTICU staff and helped coordinate care.

## 2018-01-24 NOTE — PHYSICAL THERAPY INITIAL EVALUATION ADULT - GENERAL OBSERVATIONS, REHAB EVAL
sitting in chair with NAD, +Cardiac monitor, +3L o2 via NC, +A-line, +IV, +pulse ox, +kolb, +external pacer, +chest tube x2,  present

## 2018-01-24 NOTE — PROGRESS NOTE ADULT - PROBLEM SELECTOR PLAN 1
Coumadin 5mg dosed by INR 1.05   metoprolol 25 bid  Atorvastatin 40  PTT /INR daily  Vac to suction  Left pleural ct -lws  right pleural CT -LWS  AC lovenox 40 daily  ambulate incentive spirometry

## 2018-01-24 NOTE — PROGRESS NOTE ADULT - SUBJECTIVE AND OBJECTIVE BOX
subjective Hello in bed no acute distress    VITAL SIGNS  Telemetry:  NSR 60s  Vital Signs Last 24 Hrs  T(C): 36.8 (01-24-18 @ 20:10), Max: 37.3 (01-24-18 @ 08:00)  T(F): 98.2 (01-24-18 @ 20:10), Max: 99.1 (01-24-18 @ 08:00)  HR: 67 (01-24-18 @ 20:10) (61 - 78)  BP: 122/73 (01-24-18 @ 20:10) (101/73 - 145/67)  RR: 18 (01-24-18 @ 20:10) (13 - 33)  SpO2: 99% (01-24-18 @ 20:10) (99% - 100%)         01-23 @ 07:01  -  01-24 @ 07:00  --------------------------------------------------------  IN: 1906.4 mL / OUT: 1100 mL / NET: 806.4 mL    01-24 @ 07:01  -  01-24 @ 20:34  --------------------------------------------------------  IN: 6.8 mL / OUT: 489 mL / NET: -482.2 mL  weight 62.2 preop    MEDICATIONS  (STANDING):  aspirin enteric coated 325 milliGRAM(s) Oral daily  atorvastatin 40 milliGRAM(s) Oral at bedtime  cefuroxime  IVPB 1500 milliGRAM(s) IV Intermittent every 8 hours  docusate sodium 100 milliGRAM(s) Oral three times a day  enoxaparin Injectable 40 milliGRAM(s) SubCutaneous daily  lidocaine   Patch 1 Patch Transdermal daily  metoprolol     tartrate 25 milliGRAM(s) Oral every 12 hours  mupirocin 2% Ointment 1 Application(s) Topical two times a day  pantoprazole    Tablet 40 milliGRAM(s) Oral before breakfast  polyethylene glycol 3350 17 Gram(s) Oral at bedtime  sodium chloride 0.9% lock flush 3 milliLiter(s) IV Push every 8 hours  warfarin 5 milliGRAM(s) Oral once    MEDICATIONS  (PRN):  acetaminophen   Tablet. 650 milliGRAM(s) Oral every 6 hours PRN Mild Pain (1 - 3)  oxyCODONE    IR 5 milliGRAM(s) Oral every 6 hours PRN Moderate Pain (4 - 6)  oxyCODONE    IR 10 milliGRAM(s) Oral every 8 hours PRN Severe Pain (7 - 10)    POCT Blood Glucose.: 102 mg/dL (24 Jan 2018 05:09)  POCT Blood Glucose.: 109 mg/dL (24 Jan 2018 03:12)  POCT Blood Glucose.: 104 mg/dL (24 Jan 2018 01:59)  POCT Blood Glucose.: 121 mg/dL (24 Jan 2018 01:17)  POCT Blood Glucose.: 128 mg/dL (23 Jan 2018 22:48)          Drains:     MS         [  ] Drainage:                 L Pleural  [ x ]  Drainage:    0/25          R Pleural  [ x ]  Drainage:30/90    Pacing Wires        [ x ]   Settings:    VVI 40 VMA 6                              Isolated  [  ]    Coumadin    [ x] YES          [  ]      NO         Reason:   MVR        PHYSICAL EXAM  Neurology: alert and oriented x 3, nonfocal, no gross deficits  CV :S1 S2 RRR  Sternal Wound : VAC patent  Lungs:CTA right and left pleural CT  Abdomen: soft, nontender, nondistended, positive bowel sounds, last bowel movement 1/22  :      Voids       Extremities: B/L Le warm + DP no calf tenderness negative edema        Physical Therapy Rec:   Home  [  ]   Home w/ PT  [  x]  Rehab  [  ]    Discussed with Cardiothoracic Team at AM rounds.

## 2018-01-24 NOTE — CONSULT NOTE ADULT - ASSESSMENT
47y Female with PMHx significant for Rheumatic fever s/p MVR in 1991 with subsequent MS/MR s/p MVR 1/23  - extubated  - OOB to chair  - off Primacor  - cont present meds  - good progress

## 2018-01-25 LAB
ANION GAP SERPL CALC-SCNC: 9 MMOL/L — SIGNIFICANT CHANGE UP (ref 5–17)
APTT BLD: 23.8 SEC — LOW (ref 27.5–37.4)
BUN SERPL-MCNC: 15 MG/DL — SIGNIFICANT CHANGE UP (ref 7–23)
CALCIUM SERPL-MCNC: 8.2 MG/DL — LOW (ref 8.4–10.5)
CHLORIDE SERPL-SCNC: 99 MMOL/L — SIGNIFICANT CHANGE UP (ref 96–108)
CO2 SERPL-SCNC: 24 MMOL/L — SIGNIFICANT CHANGE UP (ref 22–31)
CREAT SERPL-MCNC: 0.91 MG/DL — SIGNIFICANT CHANGE UP (ref 0.5–1.3)
GLUCOSE SERPL-MCNC: 123 MG/DL — HIGH (ref 70–99)
HCT VFR BLD CALC: 27.5 % — LOW (ref 34.5–45)
HGB BLD-MCNC: 9.2 G/DL — LOW (ref 11.5–15.5)
INR BLD: 0.97 RATIO — SIGNIFICANT CHANGE UP (ref 0.88–1.16)
MCHC RBC-ENTMCNC: 28.4 PG — SIGNIFICANT CHANGE UP (ref 27–34)
MCHC RBC-ENTMCNC: 33.3 GM/DL — SIGNIFICANT CHANGE UP (ref 32–36)
MCV RBC AUTO: 85.2 FL — SIGNIFICANT CHANGE UP (ref 80–100)
PLATELET # BLD AUTO: 93 K/UL — LOW (ref 150–400)
POTASSIUM SERPL-MCNC: 4.7 MMOL/L — SIGNIFICANT CHANGE UP (ref 3.5–5.3)
POTASSIUM SERPL-SCNC: 4.7 MMOL/L — SIGNIFICANT CHANGE UP (ref 3.5–5.3)
PROTHROM AB SERPL-ACNC: 10.6 SEC — SIGNIFICANT CHANGE UP (ref 9.8–12.7)
RBC # BLD: 3.23 M/UL — LOW (ref 3.8–5.2)
RBC # FLD: 12.6 % — SIGNIFICANT CHANGE UP (ref 10.3–14.5)
SODIUM SERPL-SCNC: 132 MMOL/L — LOW (ref 135–145)
WBC # BLD: 13.4 K/UL — HIGH (ref 3.8–10.5)
WBC # FLD AUTO: 13.4 K/UL — HIGH (ref 3.8–10.5)

## 2018-01-25 PROCEDURE — 71045 X-RAY EXAM CHEST 1 VIEW: CPT | Mod: 26,77

## 2018-01-25 PROCEDURE — 71045 X-RAY EXAM CHEST 1 VIEW: CPT | Mod: 26

## 2018-01-25 RX ORDER — WARFARIN SODIUM 2.5 MG/1
5 TABLET ORAL ONCE
Qty: 0 | Refills: 0 | Status: COMPLETED | OUTPATIENT
Start: 2018-01-25 | End: 2018-01-25

## 2018-01-25 RX ADMIN — Medication 100 MILLIGRAM(S): at 06:24

## 2018-01-25 RX ADMIN — MUPIROCIN 1 APPLICATION(S): 20 OINTMENT TOPICAL at 06:24

## 2018-01-25 RX ADMIN — WARFARIN SODIUM 5 MILLIGRAM(S): 2.5 TABLET ORAL at 21:58

## 2018-01-25 RX ADMIN — MUPIROCIN 1 APPLICATION(S): 20 OINTMENT TOPICAL at 17:30

## 2018-01-25 RX ADMIN — LIDOCAINE 1 PATCH: 4 CREAM TOPICAL at 08:37

## 2018-01-25 RX ADMIN — OXYCODONE HYDROCHLORIDE 10 MILLIGRAM(S): 5 TABLET ORAL at 04:40

## 2018-01-25 RX ADMIN — OXYCODONE HYDROCHLORIDE 10 MILLIGRAM(S): 5 TABLET ORAL at 04:10

## 2018-01-25 RX ADMIN — OXYCODONE HYDROCHLORIDE 5 MILLIGRAM(S): 5 TABLET ORAL at 09:20

## 2018-01-25 RX ADMIN — OXYCODONE HYDROCHLORIDE 5 MILLIGRAM(S): 5 TABLET ORAL at 22:40

## 2018-01-25 RX ADMIN — LIDOCAINE 1 PATCH: 4 CREAM TOPICAL at 14:37

## 2018-01-25 RX ADMIN — Medication 100 MILLIGRAM(S): at 21:58

## 2018-01-25 RX ADMIN — SODIUM CHLORIDE 3 MILLILITER(S): 9 INJECTION INTRAMUSCULAR; INTRAVENOUS; SUBCUTANEOUS at 14:32

## 2018-01-25 RX ADMIN — Medication 25 MILLIGRAM(S): at 14:38

## 2018-01-25 RX ADMIN — Medication 25 MILLIGRAM(S): at 01:16

## 2018-01-25 RX ADMIN — Medication 100 MILLIGRAM(S): at 14:37

## 2018-01-25 RX ADMIN — Medication 325 MILLIGRAM(S): at 14:37

## 2018-01-25 RX ADMIN — POLYETHYLENE GLYCOL 3350 17 GRAM(S): 17 POWDER, FOR SOLUTION ORAL at 21:58

## 2018-01-25 RX ADMIN — OXYCODONE HYDROCHLORIDE 5 MILLIGRAM(S): 5 TABLET ORAL at 22:04

## 2018-01-25 RX ADMIN — ENOXAPARIN SODIUM 40 MILLIGRAM(S): 100 INJECTION SUBCUTANEOUS at 14:37

## 2018-01-25 RX ADMIN — PANTOPRAZOLE SODIUM 40 MILLIGRAM(S): 20 TABLET, DELAYED RELEASE ORAL at 06:24

## 2018-01-25 RX ADMIN — OXYCODONE HYDROCHLORIDE 10 MILLIGRAM(S): 5 TABLET ORAL at 15:30

## 2018-01-25 RX ADMIN — OXYCODONE HYDROCHLORIDE 10 MILLIGRAM(S): 5 TABLET ORAL at 14:35

## 2018-01-25 RX ADMIN — OXYCODONE HYDROCHLORIDE 5 MILLIGRAM(S): 5 TABLET ORAL at 10:20

## 2018-01-25 RX ADMIN — ATORVASTATIN CALCIUM 40 MILLIGRAM(S): 80 TABLET, FILM COATED ORAL at 21:58

## 2018-01-25 RX ADMIN — Medication 100 MILLIGRAM(S): at 01:16

## 2018-01-25 RX ADMIN — SODIUM CHLORIDE 3 MILLILITER(S): 9 INJECTION INTRAMUSCULAR; INTRAVENOUS; SUBCUTANEOUS at 21:58

## 2018-01-25 RX ADMIN — SODIUM CHLORIDE 3 MILLILITER(S): 9 INJECTION INTRAMUSCULAR; INTRAVENOUS; SUBCUTANEOUS at 05:15

## 2018-01-25 NOTE — PROGRESS NOTE ADULT - SUBJECTIVE AND OBJECTIVE BOX
SUBJECTIVE:  	  MEDICATIONS:  metoprolol     tartrate 25 milliGRAM(s) Oral every 12 hours        acetaminophen   Tablet. 650 milliGRAM(s) Oral every 6 hours PRN  oxyCODONE    IR 5 milliGRAM(s) Oral every 6 hours PRN  oxyCODONE    IR 10 milliGRAM(s) Oral every 8 hours PRN    docusate sodium 100 milliGRAM(s) Oral three times a day  pantoprazole    Tablet 40 milliGRAM(s) Oral before breakfast  polyethylene glycol 3350 17 Gram(s) Oral at bedtime    atorvastatin 40 milliGRAM(s) Oral at bedtime  dextrose 50% Injectable 50 milliLiter(s) IV Push every 15 minutes  dextrose 50% Injectable 25 milliLiter(s) IV Push every 15 minutes    aspirin enteric coated 325 milliGRAM(s) Oral daily  enoxaparin Injectable 40 milliGRAM(s) SubCutaneous daily  lidocaine   Patch 1 Patch Transdermal daily  mupirocin 2% Ointment 1 Application(s) Topical two times a day  warfarin 5 milliGRAM(s) Oral once      REVIEW OF SYSTEMS:    CONSTITUTIONAL: No fever, weight loss, or fatigue  EYES: No eye pain, visual disturbances, or discharge  NECK: No pain or stiffness  RESPIRATORY: No cough, wheezing, chills or hemoptysis; No Shortness of Breath  CARDIOVASCULAR: No chest pain, palpitations, dizziness, or leg swelling  GASTROINTESTINAL: No abdominal or epigastric pain. No nausea, vomiting, or hematemesis; No diarrhea or constipation. No melena or hematochezia.  GENITOURINARY: No dysuria, frequency, hematuria, or incontinence  NEUROLOGICAL: No headaches, memory loss, loss of strength, numbness, or tremors  SKIN: No itching, burning, rashes, or lesions   LYMPH Nodes: No enlarged glands  MUSCULOSKELETAL: No joint pain or swelling; No muscle, back, or extremity pain  All other review of systems are negative.  	  [ ] Unable to obtain    PHYSICAL EXAM:  T(C): 36.9 (01-25-18 @ 04:40), Max: 37.1 (01-24-18 @ 12:00)  HR: 65 (01-25-18 @ 04:40) (61 - 77)  BP: 152/81 (01-25-18 @ 04:40) (101/73 - 152/81)  RR: 19 (01-25-18 @ 04:40) (17 - 33)  SpO2: 95% (01-25-18 @ 04:40) (95% - 100%)  Wt(kg): --  I&O's Summary    24 Jan 2018 07:01  -  25 Jan 2018 07:00  --------------------------------------------------------  IN: 176.8 mL / OUT: 499 mL / NET: -322.2 mL          PHYSICAL EXAM    Appearance: Normal	  HEENT:   Normal oral mucosa, PERRL, EOMI	  NECK: Soft and supple, No LAD, No JVD  Cardiovascular: Regular Rate and Rhythm, Normal S1 S2, No murmurs, No clicks, gallops or rubs  Respiratory: Lungs clear to auscultation	  Gastrointestinal:  Soft, Non-tender, + BS	  Skin: No rashes, No ecchymoses, No cyanosis  Neurologic: Non-focal  Extremities: No clubbing, cyanosis or edema  Vascular: Peripheral pulses palpable 2+ bilaterally    TELEMETRY: Normal sinus rhythm	    	    LABS:                      9.2    13.4  )-----------( 93       ( 25 Jan 2018 06:11 )             27.5     01-25    132<L>  |  99  |  15  ----------------------------<  123<H>  4.7   |  24  |  0.91    Ca    8.2<L>      25 Jan 2018 06:11    TPro  5.8<L>  /  Alb  3.8  /  TBili  0.9  /  DBili  x   /  AST  53<H>  /  ALT  15  /  AlkPhos  32<L>  01-24

## 2018-01-25 NOTE — PROGRESS NOTE ADULT - PROBLEM SELECTOR PLAN 1
asa/lopressor/statin  DC Chest tubes  OOB amb  increase BB as tolerated   inc spirometry  coumadin dose to INR 2.0-2.5  pain management

## 2018-01-25 NOTE — PROGRESS NOTE ADULT - SUBJECTIVE AND OBJECTIVE BOX
S:  feels well.  CO of incisional pain    Telemetry:  SR 60-70    Vital Signs Last 24 Hrs  T(C): 36.9 (01-25-18 @ 04:40), Max: 37 (01-24-18 @ 16:00)  T(F): 98.5 (01-25-18 @ 04:40), Max: 98.6 (01-24-18 @ 16:00)  HR: 65 (01-25-18 @ 04:40) (61 - 76)  BP: 152/81 (01-25-18 @ 04:40) (101/73 - 152/81)  RR: 19 (01-25-18 @ 04:40) (17 - 31)  SpO2: 95% (01-25-18 @ 04:40) (95% - 100%)                   01-24 @ 07:01  -  01-25 @ 07:00  --------------------------------------------------------  IN: 176.8 mL / OUT: 499 mL / NET: -322.2 mL    01-25 @ 07:01  -  01-25 @ 12:15  --------------------------------------------------------  IN: 0 mL / OUT: 600 mL / NET: -600 mL                Drains:     MS         [  ] Drainage:                 L Pleural  [ x ]  Drainage:  104/24hr no air leak            R Pleural  [ x ]  Drainage: 24/24hr no air leak    Pacing Wires        [ x  ]   Settings:   VVI 50                          Isolated  [  ]    Coumadin    [x] YES          [  ]      NO         Reason:     MVR                            9.2    13.4  )-----------( 93       ( 25 Jan 2018 06:11 )             27.5       01-25    132<L>  |  99  |  15  ----------------------------<  123<H>  4.7   |  24  |  0.91    Ca    8.2<L>      25 Jan 2018 06:11    TPro  5.8<L>  /  Alb  3.8  /  TBili  0.9  /  DBili  x   /  AST  53<H>  /  ALT  15  /  AlkPhos  32<L>  01-24      PT/INR - ( 25 Jan 2018 06:11 )   PT: 10.6 sec;   INR: 0.97 ratio         PTT - ( 25 Jan 2018 06:11 )  PTT:23.8 sec    PHYSICAL EXAM:    Neurology: alert and oriented x 3, nonfocal, no gross deficits    CV :  S1S2 heard RRR No m/r/g    Sternal Wound :  CDI , Stable    Lungs:  clear to ausc    Abdomen: soft, nontender, nondistended, positive bowel sounds, last bowel movement            Extremities:     no edema          acetaminophen   Tablet. 650 milliGRAM(s) Oral every 6 hours PRN  aspirin enteric coated 325 milliGRAM(s) Oral daily  atorvastatin 40 milliGRAM(s) Oral at bedtime  dextrose 50% Injectable 50 milliLiter(s) IV Push every 15 minutes  dextrose 50% Injectable 25 milliLiter(s) IV Push every 15 minutes  docusate sodium 100 milliGRAM(s) Oral three times a day  enoxaparin Injectable 40 milliGRAM(s) SubCutaneous daily  lidocaine   Patch 1 Patch Transdermal daily  metoprolol     tartrate 25 milliGRAM(s) Oral every 12 hours  mupirocin 2% Ointment 1 Application(s) Topical two times a day  oxyCODONE    IR 5 milliGRAM(s) Oral every 6 hours PRN  oxyCODONE    IR 10 milliGRAM(s) Oral every 8 hours PRN  pantoprazole    Tablet 40 milliGRAM(s) Oral before breakfast  polyethylene glycol 3350 17 Gram(s) Oral at bedtime  sodium chloride 0.9% lock flush 3 milliLiter(s) IV Push every 8 hours  warfarin 5 milliGRAM(s) Oral once                              Physical Therapy Rec:   Home  [  ]   Home w/ PT  [  ]  Rehab  [  ]    Discussed with Cardiothoracic Team at AM rounds.

## 2018-01-26 ENCOUNTER — TRANSCRIPTION ENCOUNTER (OUTPATIENT)
Age: 48
End: 2018-01-26

## 2018-01-26 LAB
ANION GAP SERPL CALC-SCNC: 11 MMOL/L — SIGNIFICANT CHANGE UP (ref 5–17)
BUN SERPL-MCNC: 14 MG/DL — SIGNIFICANT CHANGE UP (ref 7–23)
CALCIUM SERPL-MCNC: 8.6 MG/DL — SIGNIFICANT CHANGE UP (ref 8.4–10.5)
CHLORIDE SERPL-SCNC: 102 MMOL/L — SIGNIFICANT CHANGE UP (ref 96–108)
CO2 SERPL-SCNC: 25 MMOL/L — SIGNIFICANT CHANGE UP (ref 22–31)
CREAT SERPL-MCNC: 0.78 MG/DL — SIGNIFICANT CHANGE UP (ref 0.5–1.3)
GLUCOSE SERPL-MCNC: 102 MG/DL — HIGH (ref 70–99)
HCT VFR BLD CALC: 28.3 % — LOW (ref 34.5–45)
HGB BLD-MCNC: 9.6 G/DL — LOW (ref 11.5–15.5)
INR BLD: 1.12 RATIO — SIGNIFICANT CHANGE UP (ref 0.88–1.16)
MCHC RBC-ENTMCNC: 28.9 PG — SIGNIFICANT CHANGE UP (ref 27–34)
MCHC RBC-ENTMCNC: 33.9 GM/DL — SIGNIFICANT CHANGE UP (ref 32–36)
MCV RBC AUTO: 85.3 FL — SIGNIFICANT CHANGE UP (ref 80–100)
PLATELET # BLD AUTO: 111 K/UL — LOW (ref 150–400)
POTASSIUM SERPL-MCNC: 4.4 MMOL/L — SIGNIFICANT CHANGE UP (ref 3.5–5.3)
POTASSIUM SERPL-SCNC: 4.4 MMOL/L — SIGNIFICANT CHANGE UP (ref 3.5–5.3)
PROTHROM AB SERPL-ACNC: 12.1 SEC — SIGNIFICANT CHANGE UP (ref 9.8–12.7)
RBC # BLD: 3.32 M/UL — LOW (ref 3.8–5.2)
RBC # FLD: 12.5 % — SIGNIFICANT CHANGE UP (ref 10.3–14.5)
SODIUM SERPL-SCNC: 138 MMOL/L — SIGNIFICANT CHANGE UP (ref 135–145)
WBC # BLD: 10.8 K/UL — HIGH (ref 3.8–10.5)
WBC # FLD AUTO: 10.8 K/UL — HIGH (ref 3.8–10.5)

## 2018-01-26 PROCEDURE — 71045 X-RAY EXAM CHEST 1 VIEW: CPT | Mod: 26,76

## 2018-01-26 RX ORDER — WARFARIN SODIUM 2.5 MG/1
7.5 TABLET ORAL ONCE
Qty: 0 | Refills: 0 | Status: COMPLETED | OUTPATIENT
Start: 2018-01-26 | End: 2018-01-26

## 2018-01-26 RX ADMIN — OXYCODONE HYDROCHLORIDE 5 MILLIGRAM(S): 5 TABLET ORAL at 13:10

## 2018-01-26 RX ADMIN — MUPIROCIN 1 APPLICATION(S): 20 OINTMENT TOPICAL at 17:01

## 2018-01-26 RX ADMIN — Medication 100 MILLIGRAM(S): at 06:33

## 2018-01-26 RX ADMIN — OXYCODONE HYDROCHLORIDE 5 MILLIGRAM(S): 5 TABLET ORAL at 12:11

## 2018-01-26 RX ADMIN — OXYCODONE HYDROCHLORIDE 10 MILLIGRAM(S): 5 TABLET ORAL at 21:45

## 2018-01-26 RX ADMIN — LIDOCAINE 1 PATCH: 4 CREAM TOPICAL at 02:02

## 2018-01-26 RX ADMIN — SODIUM CHLORIDE 3 MILLILITER(S): 9 INJECTION INTRAMUSCULAR; INTRAVENOUS; SUBCUTANEOUS at 13:50

## 2018-01-26 RX ADMIN — Medication 325 MILLIGRAM(S): at 12:13

## 2018-01-26 RX ADMIN — ENOXAPARIN SODIUM 40 MILLIGRAM(S): 100 INJECTION SUBCUTANEOUS at 12:13

## 2018-01-26 RX ADMIN — MUPIROCIN 1 APPLICATION(S): 20 OINTMENT TOPICAL at 06:33

## 2018-01-26 RX ADMIN — WARFARIN SODIUM 7.5 MILLIGRAM(S): 2.5 TABLET ORAL at 21:26

## 2018-01-26 RX ADMIN — Medication 25 MILLIGRAM(S): at 14:01

## 2018-01-26 RX ADMIN — OXYCODONE HYDROCHLORIDE 10 MILLIGRAM(S): 5 TABLET ORAL at 21:22

## 2018-01-26 RX ADMIN — SODIUM CHLORIDE 3 MILLILITER(S): 9 INJECTION INTRAMUSCULAR; INTRAVENOUS; SUBCUTANEOUS at 06:16

## 2018-01-26 RX ADMIN — Medication 100 MILLIGRAM(S): at 14:01

## 2018-01-26 RX ADMIN — ATORVASTATIN CALCIUM 40 MILLIGRAM(S): 80 TABLET, FILM COATED ORAL at 21:21

## 2018-01-26 RX ADMIN — Medication 100 MILLIGRAM(S): at 21:21

## 2018-01-26 RX ADMIN — SODIUM CHLORIDE 3 MILLILITER(S): 9 INJECTION INTRAMUSCULAR; INTRAVENOUS; SUBCUTANEOUS at 21:27

## 2018-01-26 RX ADMIN — PANTOPRAZOLE SODIUM 40 MILLIGRAM(S): 20 TABLET, DELAYED RELEASE ORAL at 06:33

## 2018-01-26 RX ADMIN — LIDOCAINE 1 PATCH: 4 CREAM TOPICAL at 12:14

## 2018-01-26 NOTE — PROGRESS NOTE ADULT - SUBJECTIVE AND OBJECTIVE BOX
VITAL SIGNS    Telemetry:  rsr 60-70  Vital Signs Last 24 Hrs  T(C): 36.9 (01-26-18 @ 05:24), Max: 37.7 (01-25-18 @ 21:19)  T(F): 98.4 (01-26-18 @ 05:24), Max: 99.9 (01-25-18 @ 21:19)  HR: 63 (01-26-18 @ 05:24) (63 - 70)  BP: 130/74 (01-26-18 @ 05:24) (96/59 - 130/74)  RR: 18 (01-26-18 @ 05:24) (18 - 97)  SpO2: 99% (01-26-18 @ 05:24) (99% - 99%)            01-25 @ 07:01  -  01-26 @ 07:00  --------------------------------------------------------  IN: 980 mL / OUT: 840 mL / NET: 140 mL       Daily     Daily   Admit Wt: Drug Dosing Weight  Height (cm): 170.1 (23 Jan 2018 04:50)  Weight (kg): 62.6 (23 Jan 2018 04:50)  BMI (kg/m2): 21.6 (23 Jan 2018 04:50)  BSA (m2): 1.73 (23 Jan 2018 04:50)      CAPILLARY BLOOD GLUCOSE              acetaminophen   Tablet. 650 milliGRAM(s) Oral every 6 hours PRN  aspirin enteric coated 325 milliGRAM(s) Oral daily  atorvastatin 40 milliGRAM(s) Oral at bedtime  dextrose 50% Injectable 50 milliLiter(s) IV Push every 15 minutes  dextrose 50% Injectable 25 milliLiter(s) IV Push every 15 minutes  docusate sodium 100 milliGRAM(s) Oral three times a day  enoxaparin Injectable 40 milliGRAM(s) SubCutaneous daily  lidocaine   Patch 1 Patch Transdermal daily  metoprolol     tartrate 25 milliGRAM(s) Oral every 12 hours  mupirocin 2% Ointment 1 Application(s) Topical two times a day  oxyCODONE    IR 5 milliGRAM(s) Oral every 6 hours PRN  oxyCODONE    IR 10 milliGRAM(s) Oral every 8 hours PRN  pantoprazole    Tablet 40 milliGRAM(s) Oral before breakfast  polyethylene glycol 3350 17 Gram(s) Oral at bedtime  sodium chloride 0.9% lock flush 3 milliLiter(s) IV Push every 8 hours  warfarin 7.5 milliGRAM(s) Oral once      PHYSICAL EXAM    Subjective: "When can I go home?"   Neurology: alert and oriented x 3, nonfocal, no gross deficits  CV : tele:  RSR 60-70  Sternal Wound :  CDI w/ prevena; +pw   Lungs: clear. RR easy, unlabored   Abdomen: soft, nontender, nondistended, positive bowel sounds,+  bowel movement   Neg N/V/D   :  pt voiding without difficulty   Extremities:   GONZALEZ; neg LE edema, neg calf tenderness.   PPP bilaterally      PW: Isolated   Chest tubes: none

## 2018-01-26 NOTE — DISCHARGE NOTE ADULT - MEDICATION SUMMARY - MEDICATIONS TO TAKE
I will START or STAY ON the medications listed below when I get home from the hospital:    aspirin 325 mg oral delayed release tablet  -- 1 tab(s) by mouth once a day  -- Indication: For anti-platelet    acetaminophen 325 mg oral tablet  -- 2 tab(s) by mouth every 6 hours, As needed, Mild Pain (1 - 3)  -- Indication: For Pain med    atorvastatin 40 mg oral tablet  -- 1 tab(s) by mouth once a day (at bedtime)  -- Indication: For Hi cholesterol    metoprolol succinate 50 mg oral tablet, extended release  -- 1 tab(s) by mouth once a day (at bedtime)  -- Indication: For cardiac medication    docusate sodium 100 mg oral capsule  -- 1 cap(s) by mouth 3 times a day  -- Indication: For Stool softener    polyethylene glycol 3350 oral powder for reconstitution  -- 17 gram(s) by mouth once a day (at bedtime)  -- Indication: For laxative    pantoprazole 40 mg oral delayed release tablet  -- 1 tab(s) by mouth once a day (before a meal)  -- Indication: For Reflux I will START or STAY ON the medications listed below when I get home from the hospital:    aspirin 325 mg oral delayed release tablet  -- 1 tab(s) by mouth once a day  -- Indication: For anti-platelet    acetaminophen 325 mg oral tablet  -- 2 tab(s) by mouth every 6 hours, As needed, Mild Pain (1 - 3)  -- Indication: For Pain med    oxyCODONE 5 mg oral tablet  -- 2 tab(s) by mouth every 6 hours, As Needed MDD:8  -- Caution federal law prohibits the transfer of this drug to any person other  than the person for whom it was prescribed.  It is very important that you take or use this exactly as directed.  Do not skip doses or discontinue unless directed by your doctor.  May cause drowsiness.  Alcohol may intensify this effect.  Use care when operating dangerous machinery.  This prescription cannot be refilled.  Using more of this medication than prescribed may cause serious breathing problems.    -- Indication: For Pain medication    Coumadin 5 mg oral tablet  -- 1 tab(s) by mouth once a day   -- Do not take this drug if you are pregnant.  It is very important that you take or use this exactly as directed.  Do not skip doses or discontinue unless directed by your doctor.  Obtain medical advice before taking any non-prescription drugs as some may affect the action of this medication.    -- Indication: For blood thinner for mitral valve    atorvastatin 40 mg oral tablet  -- 1 tab(s) by mouth once a day (at bedtime)  -- Indication: For Hi cholesterol    metoprolol succinate 50 mg oral tablet, extended release  -- 1 tab(s) by mouth once a day (at bedtime)  -- Indication: For cardiac medication    docusate sodium 100 mg oral capsule  -- 1 cap(s) by mouth 3 times a day  -- Indication: For Stool softener    polyethylene glycol 3350 oral powder for reconstitution  -- 17 gram(s) by mouth once a day (at bedtime)  -- Indication: For laxative    pantoprazole 40 mg oral delayed release tablet  -- 1 tab(s) by mouth once a day (before a meal)  -- Indication: For Reflux

## 2018-01-26 NOTE — DISCHARGE NOTE ADULT - PLAN OF CARE
Reoperation - goal is full recovery follow up appt with dR. Janes Santana, cardiologist in 2-3 weeks  follow up appt with DR. Alarcon, PCP this week - as he will be managing your blood thinning levels  follow up appt with DR. Gage escudero in 2-3 weeks  ambulate  shower   weigh yourself daily

## 2018-01-26 NOTE — DISCHARGE NOTE ADULT - CARE PLAN
Principal Discharge DX:	S/P mitral valve replacement  Goal:	Reoperation - goal is full recovery  Assessment and plan of treatment:	follow up appt with dR. Janes Santana, cardiologist in 2-3 weeks  follow up appt with DR. Alarcon, PCP this week - as he will be managing your blood thinning levels  follow up appt with DR. Gage escudero in 2-3 weeks  ambulate  shower   weigh yourself daily

## 2018-01-26 NOTE — DISCHARGE NOTE ADULT - MEDICATION SUMMARY - MEDICATIONS TO STOP TAKING
I will STOP taking the medications listed below when I get home from the hospital:    Ecotrin Adult Low Strength 81 mg oral delayed release tablet  -- 1 tab(s) by mouth once a day ( last dose 1/13)

## 2018-01-26 NOTE — PROGRESS NOTE ADULT - SUBJECTIVE AND OBJECTIVE BOX
No complaints  /74 HR 63  Lungs clear  RR no murmurs  No edema    INR 1.1  WBC 10.8 Hgb 9.6  Hct 28.3  Plt 111K  BUN 14 Crt 0.78    Imp: Stable from a CV stand point after MVR  Awaiting for INR to be > 2.0

## 2018-01-26 NOTE — DISCHARGE NOTE ADULT - NS AS ACTIVITY OBS
Walking-Outdoors allowed/No Heavy lifting/straining/Showering allowed/Walking-Indoors allowed/Stairs allowed/Sex allowed

## 2018-01-26 NOTE — DISCHARGE NOTE ADULT - HOSPITAL COURSE
PHYSICAL EXAM  Neurology: alert and oriented x 3, nonfocal, no gross deficits  CV : tele:  rsr 60-80/ acc junct 70-80   Sternal Wound :  CDI URIEL; sternum stable   Lungs: clear. RR easy, unlabored   Abdomen: soft, nontender, nondistended, positive bowel sounds, + bowel movement   Neg N/V/D   :  pt voiding without difficulty   Extremities:   GONZALEZ; neg LE edema, neg calf tenderness.   PPP bilaterally

## 2018-01-26 NOTE — PROGRESS NOTE ADULT - PROBLEM SELECTOR PLAN 1
asa/lopressor/statin  D/C prevena and PW today   inc spirometry  coumadin dose to INR 2.0-2.5  pain management  increase activity as tolerated  discharge planning - home when INR therapeutic

## 2018-01-26 NOTE — DISCHARGE NOTE ADULT - OTHER SIGNIFICANT FINDINGS
VSS  Cor: S1S2  Lungs: CTA  MT Inc CDI - stable sternum  Abd: soft nt , nd +BS +BM  ext no edema no calf tenderness

## 2018-01-26 NOTE — DISCHARGE NOTE ADULT - CARE PROVIDERS DIRECT ADDRESSES
[email or web address],DirectAddress_Unknown

## 2018-01-26 NOTE — DISCHARGE NOTE ADULT - CARE PROVIDER_API CALL
Gage Vieyra), Surgery; Thoracic and Cardiac Surgery  [address] [place] [address]  Phone: [phone]  Fax: [phone]    Janes Santana), Cardiovascular Disease; Internal Medicine  [address]  [address]  [place] [address]  Phone: [phone]  Fax: [phone]    Osmany Alarcon), Internal Medicine; Nephrology  [address]  [place] [address]  Phone: [phone]  Fax: [phone]

## 2018-01-27 LAB
ANION GAP SERPL CALC-SCNC: 11 MMOL/L — SIGNIFICANT CHANGE UP (ref 5–17)
BUN SERPL-MCNC: 18 MG/DL — SIGNIFICANT CHANGE UP (ref 7–23)
CALCIUM SERPL-MCNC: 8.8 MG/DL — SIGNIFICANT CHANGE UP (ref 8.4–10.5)
CHLORIDE SERPL-SCNC: 102 MMOL/L — SIGNIFICANT CHANGE UP (ref 96–108)
CO2 SERPL-SCNC: 25 MMOL/L — SIGNIFICANT CHANGE UP (ref 22–31)
CREAT SERPL-MCNC: 0.85 MG/DL — SIGNIFICANT CHANGE UP (ref 0.5–1.3)
GLUCOSE SERPL-MCNC: 116 MG/DL — HIGH (ref 70–99)
HCT VFR BLD CALC: 27.2 % — LOW (ref 34.5–45)
HGB BLD-MCNC: 8.9 G/DL — LOW (ref 11.5–15.5)
INR BLD: 1.38 RATIO — HIGH (ref 0.88–1.16)
MCHC RBC-ENTMCNC: 27.8 PG — SIGNIFICANT CHANGE UP (ref 27–34)
MCHC RBC-ENTMCNC: 32.8 GM/DL — SIGNIFICANT CHANGE UP (ref 32–36)
MCV RBC AUTO: 84.7 FL — SIGNIFICANT CHANGE UP (ref 80–100)
PLATELET # BLD AUTO: 141 K/UL — LOW (ref 150–400)
POTASSIUM SERPL-MCNC: 3.8 MMOL/L — SIGNIFICANT CHANGE UP (ref 3.5–5.3)
POTASSIUM SERPL-SCNC: 3.8 MMOL/L — SIGNIFICANT CHANGE UP (ref 3.5–5.3)
PROTHROM AB SERPL-ACNC: 15.1 SEC — HIGH (ref 9.8–12.7)
RBC # BLD: 3.22 M/UL — LOW (ref 3.8–5.2)
RBC # FLD: 12.1 % — SIGNIFICANT CHANGE UP (ref 10.3–14.5)
SODIUM SERPL-SCNC: 138 MMOL/L — SIGNIFICANT CHANGE UP (ref 135–145)
WBC # BLD: 7.6 K/UL — SIGNIFICANT CHANGE UP (ref 3.8–10.5)
WBC # FLD AUTO: 7.6 K/UL — SIGNIFICANT CHANGE UP (ref 3.8–10.5)

## 2018-01-27 RX ORDER — OXYCODONE HYDROCHLORIDE 5 MG/1
10 TABLET ORAL EVERY 4 HOURS
Qty: 0 | Refills: 0 | Status: DISCONTINUED | OUTPATIENT
Start: 2018-01-27 | End: 2018-01-28

## 2018-01-27 RX ORDER — METOPROLOL TARTRATE 50 MG
50 TABLET ORAL AT BEDTIME
Qty: 0 | Refills: 0 | Status: DISCONTINUED | OUTPATIENT
Start: 2018-01-27 | End: 2018-01-28

## 2018-01-27 RX ORDER — OXYCODONE HYDROCHLORIDE 5 MG/1
5 TABLET ORAL EVERY 4 HOURS
Qty: 0 | Refills: 0 | Status: DISCONTINUED | OUTPATIENT
Start: 2018-01-27 | End: 2018-01-28

## 2018-01-27 RX ORDER — WARFARIN SODIUM 2.5 MG/1
7.5 TABLET ORAL ONCE
Qty: 0 | Refills: 0 | Status: COMPLETED | OUTPATIENT
Start: 2018-01-27 | End: 2018-01-27

## 2018-01-27 RX ORDER — POTASSIUM CHLORIDE 20 MEQ
20 PACKET (EA) ORAL ONCE
Qty: 0 | Refills: 0 | Status: COMPLETED | OUTPATIENT
Start: 2018-01-27 | End: 2018-01-27

## 2018-01-27 RX ADMIN — PANTOPRAZOLE SODIUM 40 MILLIGRAM(S): 20 TABLET, DELAYED RELEASE ORAL at 06:16

## 2018-01-27 RX ADMIN — ATORVASTATIN CALCIUM 40 MILLIGRAM(S): 80 TABLET, FILM COATED ORAL at 21:51

## 2018-01-27 RX ADMIN — OXYCODONE HYDROCHLORIDE 10 MILLIGRAM(S): 5 TABLET ORAL at 02:55

## 2018-01-27 RX ADMIN — SODIUM CHLORIDE 3 MILLILITER(S): 9 INJECTION INTRAMUSCULAR; INTRAVENOUS; SUBCUTANEOUS at 13:41

## 2018-01-27 RX ADMIN — SODIUM CHLORIDE 3 MILLILITER(S): 9 INJECTION INTRAMUSCULAR; INTRAVENOUS; SUBCUTANEOUS at 21:55

## 2018-01-27 RX ADMIN — MUPIROCIN 1 APPLICATION(S): 20 OINTMENT TOPICAL at 05:36

## 2018-01-27 RX ADMIN — Medication 100 MILLIGRAM(S): at 05:36

## 2018-01-27 RX ADMIN — Medication 25 MILLIGRAM(S): at 03:48

## 2018-01-27 RX ADMIN — Medication 325 MILLIGRAM(S): at 11:11

## 2018-01-27 RX ADMIN — Medication 100 MILLIGRAM(S): at 13:42

## 2018-01-27 RX ADMIN — OXYCODONE HYDROCHLORIDE 10 MILLIGRAM(S): 5 TABLET ORAL at 02:23

## 2018-01-27 RX ADMIN — Medication 100 MILLIGRAM(S): at 21:51

## 2018-01-27 RX ADMIN — OXYCODONE HYDROCHLORIDE 10 MILLIGRAM(S): 5 TABLET ORAL at 19:35

## 2018-01-27 RX ADMIN — WARFARIN SODIUM 7.5 MILLIGRAM(S): 2.5 TABLET ORAL at 21:51

## 2018-01-27 RX ADMIN — ENOXAPARIN SODIUM 40 MILLIGRAM(S): 100 INJECTION SUBCUTANEOUS at 11:11

## 2018-01-27 RX ADMIN — LIDOCAINE 1 PATCH: 4 CREAM TOPICAL at 00:00

## 2018-01-27 RX ADMIN — Medication 20 MILLIEQUIVALENT(S): at 09:02

## 2018-01-27 RX ADMIN — MUPIROCIN 1 APPLICATION(S): 20 OINTMENT TOPICAL at 17:00

## 2018-01-27 RX ADMIN — Medication 50 MILLIGRAM(S): at 21:51

## 2018-01-27 RX ADMIN — SODIUM CHLORIDE 3 MILLILITER(S): 9 INJECTION INTRAMUSCULAR; INTRAVENOUS; SUBCUTANEOUS at 05:31

## 2018-01-27 NOTE — PROGRESS NOTE ADULT - SUBJECTIVE AND OBJECTIVE BOX
VITAL SIGNS    Telemetry:  rsr 60-80/ acc junct 70-80   Vital Signs Last 24 Hrs  T(C): 37.1 (18 @ 05:35), Max: 37.1 (18 @ 05:35)  T(F): 98.8 (18 @ 05:35), Max: 98.8 (18 @ 05:35)  HR: 71 (18 @ 05:35) (63 - 71)  BP: 112/70 (18 @ 05:35) (100/64 - 112/70)  RR: 17 (18 @ 05:35) (16 - 18)  SpO2: 96% (18 @ 05:35) (96% - 100%)             @ 07:01  -   @ 07:00  --------------------------------------------------------  IN: 940 mL / OUT: 700 mL / NET: 240 mL       Daily     Daily Weight in k.6 (2018 09:00)  Admit Wt: Drug Dosing Weight  Height (cm): 170.1 (2018 04:50)  Weight (kg): 62.6 (2018 04:50)  BMI (kg/m2): 21.6 (2018 04:50)  BSA (m2): 1.73 (2018 04:50)      CAPILLARY BLOOD GLUCOSE              acetaminophen   Tablet. 650 milliGRAM(s) Oral every 6 hours PRN  aspirin enteric coated 325 milliGRAM(s) Oral daily  atorvastatin 40 milliGRAM(s) Oral at bedtime  dextrose 50% Injectable 50 milliLiter(s) IV Push every 15 minutes  dextrose 50% Injectable 25 milliLiter(s) IV Push every 15 minutes  docusate sodium 100 milliGRAM(s) Oral three times a day  enoxaparin Injectable 40 milliGRAM(s) SubCutaneous daily  lidocaine   Patch 1 Patch Transdermal daily  metoprolol succinate ER 50 milliGRAM(s) Oral at bedtime  mupirocin 2% Ointment 1 Application(s) Topical two times a day  oxyCODONE    IR 5 milliGRAM(s) Oral every 4 hours PRN  oxyCODONE    IR 10 milliGRAM(s) Oral every 4 hours PRN  pantoprazole    Tablet 40 milliGRAM(s) Oral before breakfast  polyethylene glycol 3350 17 Gram(s) Oral at bedtime  sodium chloride 0.9% lock flush 3 milliLiter(s) IV Push every 8 hours  warfarin 7.5 milliGRAM(s) Oral once      PHYSICAL EXAM    Subjective: "I can't leave today?"   Neurology: alert and oriented x 3, nonfocal, no gross deficits  CV : tele:  rsr 60-80/ acc junct 70-80   Sternal Wound :  CDI URIEL; sternum stable   Lungs: clear. RR easy, unlabored   Abdomen: soft, nontender, nondistended, positive bowel sounds, + bowel movement   Neg N/V/D   :  pt voiding without difficulty   Extremities:   GONZALEZ; neg LE edema, neg calf tenderness.   PPP bilaterally      PW: none   Chest tubes: none

## 2018-01-27 NOTE — PROGRESS NOTE ADULT - SUBJECTIVE AND OBJECTIVE BOX
SUBJECTIVE: The patient denies chest pain, shortness of breath, arm pain or jaw pain, dizziness or palpitations.    	  MEDICATIONS:  MEDICATIONS  (STANDING):  aspirin enteric coated 325 milliGRAM(s) Oral daily  atorvastatin 40 milliGRAM(s) Oral at bedtime  dextrose 50% Injectable 50 milliLiter(s) IV Push every 15 minutes  dextrose 50% Injectable 25 milliLiter(s) IV Push every 15 minutes  docusate sodium 100 milliGRAM(s) Oral three times a day  enoxaparin Injectable 40 milliGRAM(s) SubCutaneous daily  lidocaine   Patch 1 Patch Transdermal daily  metoprolol     tartrate 25 milliGRAM(s) Oral every 12 hours  mupirocin 2% Ointment 1 Application(s) Topical two times a day  pantoprazole    Tablet 40 milliGRAM(s) Oral before breakfast  polyethylene glycol 3350 17 Gram(s) Oral at bedtime  sodium chloride 0.9% lock flush 3 milliLiter(s) IV Push every 8 hours    MEDICATIONS  (PRN):  acetaminophen   Tablet. 650 milliGRAM(s) Oral every 6 hours PRN Mild Pain (1 - 3)  oxyCODONE    IR 5 milliGRAM(s) Oral every 4 hours PRN Moderate Pain (4 - 6)  oxyCODONE    IR 10 milliGRAM(s) Oral every 4 hours PRN Severe Pain (7 - 10)        REVIEW OF SYSTEMS:    CONSTITUTIONAL: No fever, weight loss, or fatigue  EYES: No eye pain, visual disturbances, or discharge  NECK: No pain or stiffness  RESPIRATORY: No cough, wheezing, chills or hemoptysis; No Shortness of Breath  CARDIOVASCULAR: No chest pain, palpitations, dizziness, or leg swelling  GASTROINTESTINAL: No abdominal or epigastric pain. No nausea, vomiting, or hematemesis; No diarrhea or constipation. No melena or hematochezia.  GENITOURINARY: No dysuria, frequency, hematuria, or incontinence  NEUROLOGICAL: No headaches, memory loss, loss of strength, numbness, or tremors  SKIN: No itching, burning, rashes, or lesions   LYMPH Nodes: No enlarged glands  MUSCULOSKELETAL: No joint pain or swelling; No muscle, back, or extremity pain  All other review of systems are negative.  	  [ ] Unable to obtain    PHYSICAL EXAM:  T(F): 98.8 (01-27-18 @ 05:35), Max: 98.8 (01-27-18 @ 05:35)  HR: 71 (01-27-18 @ 05:35) (63 - 71)  BP: 112/70 (01-27-18 @ 05:35) (100/64 - 112/70)  RR: 17 (01-27-18 @ 05:35) (16 - 18)  SpO2: 96% (01-27-18 @ 05:35) (96% - 100%)  Wt(kg): --  ,   I&O's Summary    25 Jan 2018 07:01  -  26 Jan 2018 07:00  --------------------------------------------------------  IN: 980 mL / OUT: 840 mL / NET: 140 mL    26 Jan 2018 07:01  -  27 Jan 2018 06:53  --------------------------------------------------------  IN: 940 mL / OUT: 700 mL / NET: 240 mL      PHYSICAL EXAM    Appearance: Normal	  HEENT:   Normal oral mucosa, PERRL, EOMI	  NECK: Soft and supple, No LAD, No JVD  Cardiovascular: Regular Rate and Rhythm, Normal S1 S2, No murmurs, No clicks, gallops or rubs  Respiratory: Lungs clear to auscultation	  Gastrointestinal:  Soft, Non-tender, + BS	  Skin: No rashes, No ecchymoses, No cyanosis  Neurologic: Non-focal  Extremities: No clubbing, cyanosis or edema  Vascular: Peripheral pulses palpable 2+ bilaterally    TELEMETRY: Normal sinus rhythm	    	    LABS:                                 8.9    7.6   )-----------( 141      ( 27 Jan 2018 06:00 )             27.2               01-27    138  |  102  |  18  ----------------------------<  116<H>  3.8   |  25  |  0.85    Ca    8.8      27 Jan 2018 06:00      PT/INR - ( 27 Jan 2018 06:00 )   PT: 15.1 sec;   INR: 1.38 ratio

## 2018-01-27 NOTE — PROGRESS NOTE ADULT - PROBLEM SELECTOR PLAN 1
continue postop care   asa/lopressor/statin  pulm toilet   coumadin dose to INR 2.0-2.5  pain management  increase activity as tolerated  discharge planning - home when INR therapeutic sun? as per Dr. Vieyra

## 2018-01-28 VITALS — WEIGHT: 158.73 LBS

## 2018-01-28 LAB
ANION GAP SERPL CALC-SCNC: 8 MMOL/L — SIGNIFICANT CHANGE UP (ref 5–17)
BUN SERPL-MCNC: 14 MG/DL — SIGNIFICANT CHANGE UP (ref 7–23)
CALCIUM SERPL-MCNC: 8.8 MG/DL — SIGNIFICANT CHANGE UP (ref 8.4–10.5)
CHLORIDE SERPL-SCNC: 104 MMOL/L — SIGNIFICANT CHANGE UP (ref 96–108)
CO2 SERPL-SCNC: 25 MMOL/L — SIGNIFICANT CHANGE UP (ref 22–31)
CREAT SERPL-MCNC: 0.79 MG/DL — SIGNIFICANT CHANGE UP (ref 0.5–1.3)
GLUCOSE SERPL-MCNC: 101 MG/DL — HIGH (ref 70–99)
HCT VFR BLD CALC: 26.9 % — LOW (ref 34.5–45)
HGB BLD-MCNC: 9.2 G/DL — LOW (ref 11.5–15.5)
INR BLD: 2.12 RATIO — HIGH (ref 0.88–1.16)
MCHC RBC-ENTMCNC: 28.7 PG — SIGNIFICANT CHANGE UP (ref 27–34)
MCHC RBC-ENTMCNC: 34.2 GM/DL — SIGNIFICANT CHANGE UP (ref 32–36)
MCV RBC AUTO: 84.1 FL — SIGNIFICANT CHANGE UP (ref 80–100)
PLATELET # BLD AUTO: 176 K/UL — SIGNIFICANT CHANGE UP (ref 150–400)
POTASSIUM SERPL-MCNC: 4.2 MMOL/L — SIGNIFICANT CHANGE UP (ref 3.5–5.3)
POTASSIUM SERPL-SCNC: 4.2 MMOL/L — SIGNIFICANT CHANGE UP (ref 3.5–5.3)
PROTHROM AB SERPL-ACNC: 23.3 SEC — HIGH (ref 9.8–12.7)
RBC # BLD: 3.2 M/UL — LOW (ref 3.8–5.2)
RBC # FLD: 12 % — SIGNIFICANT CHANGE UP (ref 10.3–14.5)
SODIUM SERPL-SCNC: 137 MMOL/L — SIGNIFICANT CHANGE UP (ref 135–145)
WBC # BLD: 6.8 K/UL — SIGNIFICANT CHANGE UP (ref 3.8–10.5)
WBC # FLD AUTO: 6.8 K/UL — SIGNIFICANT CHANGE UP (ref 3.8–10.5)

## 2018-01-28 PROCEDURE — 84443 ASSAY THYROID STIM HORMONE: CPT

## 2018-01-28 PROCEDURE — 82947 ASSAY GLUCOSE BLOOD QUANT: CPT

## 2018-01-28 PROCEDURE — 97116 GAIT TRAINING THERAPY: CPT

## 2018-01-28 PROCEDURE — 87640 STAPH A DNA AMP PROBE: CPT

## 2018-01-28 PROCEDURE — 88300 SURGICAL PATH GROSS: CPT

## 2018-01-28 PROCEDURE — P9016: CPT

## 2018-01-28 PROCEDURE — C1887: CPT

## 2018-01-28 PROCEDURE — 84702 CHORIONIC GONADOTROPIN TEST: CPT

## 2018-01-28 PROCEDURE — 86923 COMPATIBILITY TEST ELECTRIC: CPT

## 2018-01-28 PROCEDURE — 83605 ASSAY OF LACTIC ACID: CPT

## 2018-01-28 PROCEDURE — 86901 BLOOD TYPING SEROLOGIC RH(D): CPT

## 2018-01-28 PROCEDURE — P9047: CPT

## 2018-01-28 PROCEDURE — 85014 HEMATOCRIT: CPT

## 2018-01-28 PROCEDURE — 86900 BLOOD TYPING SEROLOGIC ABO: CPT

## 2018-01-28 PROCEDURE — 82962 GLUCOSE BLOOD TEST: CPT

## 2018-01-28 PROCEDURE — 85384 FIBRINOGEN ACTIVITY: CPT

## 2018-01-28 PROCEDURE — 85027 COMPLETE CBC AUTOMATED: CPT

## 2018-01-28 PROCEDURE — C1889: CPT

## 2018-01-28 PROCEDURE — 82550 ASSAY OF CK (CPK): CPT

## 2018-01-28 PROCEDURE — 84132 ASSAY OF SERUM POTASSIUM: CPT

## 2018-01-28 PROCEDURE — 93460 R&L HRT ART/VENTRICLE ANGIO: CPT

## 2018-01-28 PROCEDURE — 82330 ASSAY OF CALCIUM: CPT

## 2018-01-28 PROCEDURE — 94010 BREATHING CAPACITY TEST: CPT

## 2018-01-28 PROCEDURE — 80053 COMPREHEN METABOLIC PANEL: CPT

## 2018-01-28 PROCEDURE — 80061 LIPID PANEL: CPT

## 2018-01-28 PROCEDURE — P9045: CPT

## 2018-01-28 PROCEDURE — 88305 TISSUE EXAM BY PATHOLOGIST: CPT

## 2018-01-28 PROCEDURE — 84484 ASSAY OF TROPONIN QUANT: CPT

## 2018-01-28 PROCEDURE — 84436 ASSAY OF TOTAL THYROXINE: CPT

## 2018-01-28 PROCEDURE — 97162 PT EVAL MOD COMPLEX 30 MIN: CPT

## 2018-01-28 PROCEDURE — 85730 THROMBOPLASTIN TIME PARTIAL: CPT

## 2018-01-28 PROCEDURE — 82803 BLOOD GASES ANY COMBINATION: CPT

## 2018-01-28 PROCEDURE — 84480 ASSAY TRIIODOTHYRONINE (T3): CPT

## 2018-01-28 PROCEDURE — 86850 RBC ANTIBODY SCREEN: CPT

## 2018-01-28 PROCEDURE — 97110 THERAPEUTIC EXERCISES: CPT

## 2018-01-28 PROCEDURE — 71045 X-RAY EXAM CHEST 1 VIEW: CPT

## 2018-01-28 PROCEDURE — 86891 AUTOLOGOUS BLOOD OP SALVAGE: CPT

## 2018-01-28 PROCEDURE — 93306 TTE W/DOPPLER COMPLETE: CPT

## 2018-01-28 PROCEDURE — 84295 ASSAY OF SERUM SODIUM: CPT

## 2018-01-28 PROCEDURE — 94002 VENT MGMT INPAT INIT DAY: CPT

## 2018-01-28 PROCEDURE — 93005 ELECTROCARDIOGRAM TRACING: CPT

## 2018-01-28 PROCEDURE — C1894: CPT

## 2018-01-28 PROCEDURE — C1769: CPT

## 2018-01-28 PROCEDURE — 83036 HEMOGLOBIN GLYCOSYLATED A1C: CPT

## 2018-01-28 PROCEDURE — 99152 MOD SED SAME PHYS/QHP 5/>YRS: CPT

## 2018-01-28 PROCEDURE — 82435 ASSAY OF BLOOD CHLORIDE: CPT

## 2018-01-28 PROCEDURE — 87641 MR-STAPH DNA AMP PROBE: CPT

## 2018-01-28 PROCEDURE — 85610 PROTHROMBIN TIME: CPT

## 2018-01-28 PROCEDURE — 99153 MOD SED SAME PHYS/QHP EA: CPT

## 2018-01-28 PROCEDURE — 81001 URINALYSIS AUTO W/SCOPE: CPT

## 2018-01-28 PROCEDURE — 83880 ASSAY OF NATRIURETIC PEPTIDE: CPT

## 2018-01-28 PROCEDURE — 82553 CREATINE MB FRACTION: CPT

## 2018-01-28 PROCEDURE — C1751: CPT

## 2018-01-28 PROCEDURE — 80048 BASIC METABOLIC PNL TOTAL CA: CPT

## 2018-01-28 RX ORDER — ACETAMINOPHEN 500 MG
2 TABLET ORAL
Qty: 0 | Refills: 0 | DISCHARGE
Start: 2018-01-28

## 2018-01-28 RX ORDER — ATORVASTATIN CALCIUM 80 MG/1
1 TABLET, FILM COATED ORAL
Qty: 30 | Refills: 0 | OUTPATIENT
Start: 2018-01-28 | End: 2018-02-26

## 2018-01-28 RX ORDER — METOPROLOL TARTRATE 50 MG
1 TABLET ORAL
Qty: 30 | Refills: 0 | OUTPATIENT
Start: 2018-01-28 | End: 2018-02-26

## 2018-01-28 RX ORDER — WARFARIN SODIUM 2.5 MG/1
1 TABLET ORAL
Qty: 30 | Refills: 0 | OUTPATIENT
Start: 2018-01-28 | End: 2018-02-26

## 2018-01-28 RX ORDER — ASPIRIN/CALCIUM CARB/MAGNESIUM 324 MG
1 TABLET ORAL
Qty: 0 | Refills: 0 | COMMUNITY

## 2018-01-28 RX ORDER — ASPIRIN/CALCIUM CARB/MAGNESIUM 324 MG
1 TABLET ORAL
Qty: 30 | Refills: 0 | OUTPATIENT
Start: 2018-01-28 | End: 2018-02-26

## 2018-01-28 RX ORDER — OXYCODONE HYDROCHLORIDE 5 MG/1
2 TABLET ORAL
Qty: 40 | Refills: 0 | OUTPATIENT
Start: 2018-01-28 | End: 2018-02-01

## 2018-01-28 RX ORDER — OXYCODONE HYDROCHLORIDE 5 MG/1
1 TABLET ORAL
Qty: 36 | Refills: 0 | OUTPATIENT
Start: 2018-01-28 | End: 2018-02-02

## 2018-01-28 RX ORDER — POLYETHYLENE GLYCOL 3350 17 G/17G
17 POWDER, FOR SOLUTION ORAL
Qty: 17 | Refills: 0 | OUTPATIENT
Start: 2018-01-28 | End: 2018-02-26

## 2018-01-28 RX ORDER — DOCUSATE SODIUM 100 MG
1 CAPSULE ORAL
Qty: 90 | Refills: 0 | OUTPATIENT
Start: 2018-01-28 | End: 2018-02-26

## 2018-01-28 RX ORDER — PANTOPRAZOLE SODIUM 20 MG/1
1 TABLET, DELAYED RELEASE ORAL
Qty: 30 | Refills: 0 | OUTPATIENT
Start: 2018-01-28 | End: 2018-02-26

## 2018-01-28 RX ADMIN — MUPIROCIN 1 APPLICATION(S): 20 OINTMENT TOPICAL at 05:50

## 2018-01-28 RX ADMIN — OXYCODONE HYDROCHLORIDE 10 MILLIGRAM(S): 5 TABLET ORAL at 04:38

## 2018-01-28 RX ADMIN — Medication 100 MILLIGRAM(S): at 05:49

## 2018-01-28 RX ADMIN — SODIUM CHLORIDE 3 MILLILITER(S): 9 INJECTION INTRAMUSCULAR; INTRAVENOUS; SUBCUTANEOUS at 05:47

## 2018-01-28 RX ADMIN — ENOXAPARIN SODIUM 40 MILLIGRAM(S): 100 INJECTION SUBCUTANEOUS at 11:10

## 2018-01-28 RX ADMIN — Medication 325 MILLIGRAM(S): at 11:10

## 2018-01-28 RX ADMIN — PANTOPRAZOLE SODIUM 40 MILLIGRAM(S): 20 TABLET, DELAYED RELEASE ORAL at 05:50

## 2018-01-28 NOTE — PROGRESS NOTE ADULT - PROVIDER SPECIALTY LIST ADULT
CT Surgery
CTU
Cardiology

## 2018-01-28 NOTE — PROGRESS NOTE ADULT - SUBJECTIVE AND OBJECTIVE BOX
SUBJECTIVE: The patient denies chest pain, shortness of breath, arm pain or jaw pain, dizziness or palpitations.    	  MEDICATIONS:  MEDICATIONS  (STANDING):  aspirin enteric coated 325 milliGRAM(s) Oral daily  atorvastatin 40 milliGRAM(s) Oral at bedtime  dextrose 50% Injectable 50 milliLiter(s) IV Push every 15 minutes  dextrose 50% Injectable 25 milliLiter(s) IV Push every 15 minutes  docusate sodium 100 milliGRAM(s) Oral three times a day  enoxaparin Injectable 40 milliGRAM(s) SubCutaneous daily  lidocaine   Patch 1 Patch Transdermal daily  metoprolol succinate ER 50 milliGRAM(s) Oral at bedtime  pantoprazole    Tablet 40 milliGRAM(s) Oral before breakfast  polyethylene glycol 3350 17 Gram(s) Oral at bedtime  sodium chloride 0.9% lock flush 3 milliLiter(s) IV Push every 8 hours    MEDICATIONS  (PRN):  acetaminophen   Tablet. 650 milliGRAM(s) Oral every 6 hours PRN Mild Pain (1 - 3)  oxyCODONE    IR 5 milliGRAM(s) Oral every 4 hours PRN Moderate Pain (4 - 6)  oxyCODONE    IR 10 milliGRAM(s) Oral every 4 hours PRN Severe Pain (7 - 10)          REVIEW OF SYSTEMS:    CONSTITUTIONAL: No fever, weight loss, or fatigue  EYES: No eye pain, visual disturbances, or discharge  NECK: No pain or stiffness  RESPIRATORY: No cough, wheezing, chills or hemoptysis; No Shortness of Breath  CARDIOVASCULAR: No chest pain, palpitations, dizziness, or leg swelling  GASTROINTESTINAL: No abdominal or epigastric pain. No nausea, vomiting, or hematemesis; No diarrhea or constipation. No melena or hematochezia.  GENITOURINARY: No dysuria, frequency, hematuria, or incontinence  NEUROLOGICAL: No headaches, memory loss, loss of strength, numbness, or tremors  SKIN: No itching, burning, rashes, or lesions   LYMPH Nodes: No enlarged glands  MUSCULOSKELETAL: No joint pain or swelling; No muscle, back, or extremity pain  All other review of systems are negative.  	  [ ] Unable to obtain    PHYSICAL EXAM:  T(F): 98.7 (01-28-18 @ 06:00), Max: 99.1 (01-27-18 @ 20:37)  HR: 64 (01-28-18 @ 06:00) (64 - 72)  BP: 111/67 (01-28-18 @ 06:00) (93/58 - 122/78)  RR: 18 (01-28-18 @ 06:00) (18 - 18)  SpO2: 98% (01-28-18 @ 06:00) (98% - 100%)  Wt(kg): --  ,   I&O's Summary    27 Jan 2018 07:01  -  28 Jan 2018 07:00  --------------------------------------------------------  IN: 860 mL / OUT: 625 mL / NET: 235 mL            PHYSICAL EXAM    Appearance: Normal	  HEENT:   Normal oral mucosa, PERRL, EOMI	  NECK: Soft and supple, No LAD, No JVD  Cardiovascular: Regular Rate and Rhythm, Normal S1 S2, No murmurs, No clicks, gallops or rubs  Respiratory: Lungs clear to auscultation	  Gastrointestinal:  Soft, Non-tender, + BS	  Skin: No rashes, No ecchymoses, No cyanosis  Neurologic: Non-focal  Extremities: No clubbing, cyanosis or edema  Vascular: Peripheral pulses palpable 2+ bilaterally    TELEMETRY: Normal sinus rhythm	    	    LABS:                                 9.2    6.8   )-----------( 176      ( 28 Jan 2018 06:31 )             26.9               01-28    137  |  104  |  14  ----------------------------<  101<H>  4.2   |  25  |  0.79    Ca    8.8      28 Jan 2018 06:31      PT/INR - ( 28 Jan 2018 06:31 )   PT: 23.3 sec;   INR: 2.12 ratio

## 2018-01-28 NOTE — PROGRESS NOTE ADULT - ASSESSMENT
46 y/o  female with PMHx significant for Rheumatic fever when she was teenager, s/p MVR in 1991 now s/p Mitral valve replacement  01/23/2018  re-op MVR (bioprosthetic)     Patient doing well post op.  Incentive spirometry  Ambulate in hallway  Await INR to be greater than 2.  Dose coumadin today.  Continue current medications
46 y/o  female with PMHx significant for Rheumatic fever when she was teenager, s/p MVR in 1991 now s/p Mitral valve replacement  01/23/2018  re-op MVR (bioprosthetic)     Patient doing well post op.  Incentive spirometry  Ambulate in hallway  INR is therapeutic at 2.12  Continue current medications  Should follow up in our office when discharged with an INR check on 1/31/18
46 y/o  female with PMHx significant for Rheumatic fever when she was teenager, s/p MVR in 1991 now s/p Mitral valve replacement  01/23/2018  re-op MVR (bioprosthetic)     Patient doing well early post op.  Incentive spirometry  Ambulate in hallway  Continue current medications
46 y/o  female with PMHx significant for Rheumatic fever when she was teenager, s/p MVR in 1991 underwent cardiac cath on1/22 revealed  clean cors .MSSA+ on bactroban  1/23/18 Redo  Mitral valve replacement  01/23/2018  re-op MVR (t).post op course includes fluid resuscitation with albumin Precedex gtt for agitation insulin gtt for hyperglycemia primacor for inotropic support .  Extubated 1/23. Stabilized  GTTs weaned off.   1/24 mediastinal chest tube d/c kolb D/C pt voiding seen by PT ambulated  Coumadin 5 for mitral valve  transferred to [address] [place] [address] CT midsternal Vac dressing in place patent - disposition to home with PT .
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 Vieyra.
46 yo F POD #2  Reop MVR.  HD stable.  Will D/C Chest tubes today.  Dose coumadin to INR 2.0-2.5.  Isolate wires
47F with PMHx significant for Rheumatic fever when she was teenager, s/p MVR in 1991 presents today for coronary angiogram for presurgical clearance for scheduled MVR on 1/23/18 with Dr. Vieyra. Patient states she is asymptomatic, denies chest pain, PALACIOS, SOB, dizziness / syncope. Patient had a routine stress test which was not well tolerated. Patient was seen and evaluated by Dr. Vieyra and scheduled for MVR on 1/23/18. (patient is getting admitted after cath for MVR).  pt pre treated for IV dye allergy.  pt now s/p 1/23/18 Reop MVR- bioprosthetic valve.  postop course unremarkable  1/26 VSS; anticoagulation with coumadin INR 1.1 today- give coumadin 7.5 mg dennis; Dr. Alarcon to follow INR levels as an outpatient  Discharge planning- home when INR therapeutic
47F with PMHx significant for Rheumatic fever when she was teenager, s/p MVR in 1991 presents today for coronary angiogram for presurgical clearance for scheduled MVR on 1/23/18 with Dr. Vieyra. Patient states she is asymptomatic, denies chest pain, PALACIOS, SOB, dizziness / syncope. Patient had a routine stress test which was not well tolerated. Patient was seen and evaluated by Dr. Vieyra and scheduled for MVR on 1/23/18. (patient is getting admitted after cath for MVR).  pt pre treated for IV dye allergy.  pt now s/p 1/23/18 Reop MVR- bioprosthetic valve.  postop course unremarkable  1/26 VSS; anticoagulation with coumadin INR 1.1 today- give coumadin 7.5 mg tonight; Dr. Alarcon to follow INR levels as an outpatient  1/27 INR 1.3 today- coumadin 7.5 mg tonight; VSS   Discharge planning- home when INR therapeutic sun?

## 2018-02-14 ENCOUNTER — APPOINTMENT (OUTPATIENT)
Dept: CARDIOTHORACIC SURGERY | Facility: CLINIC | Age: 48
End: 2018-02-14
Payer: COMMERCIAL

## 2018-02-14 VITALS
TEMPERATURE: 98.5 F | HEART RATE: 75 BPM | WEIGHT: 155 LBS | OXYGEN SATURATION: 100 % | BODY MASS INDEX: 24.33 KG/M2 | SYSTOLIC BLOOD PRESSURE: 131 MMHG | RESPIRATION RATE: 15 BRPM | HEIGHT: 67 IN | DIASTOLIC BLOOD PRESSURE: 85 MMHG

## 2018-02-14 PROCEDURE — 99024 POSTOP FOLLOW-UP VISIT: CPT

## 2018-02-14 RX ORDER — OXYCODONE HYDROCHLORIDE 5 MG/1
5 CAPSULE ORAL
Qty: 30 | Refills: 0 | Status: DISCONTINUED | OUTPATIENT
End: 2018-02-14

## 2018-02-14 RX ORDER — ATORVASTATIN CALCIUM 40 MG/1
40 TABLET, FILM COATED ORAL
Refills: 0 | Status: DISCONTINUED | COMMUNITY
End: 2018-02-14

## 2018-02-14 RX ORDER — PANTOPRAZOLE SODIUM 40 MG/1
40 TABLET, DELAYED RELEASE ORAL
Qty: 30 | Refills: 0 | Status: DISCONTINUED | COMMUNITY
End: 2018-02-14

## 2018-02-15 ENCOUNTER — APPOINTMENT (OUTPATIENT)
Dept: CARDIOTHORACIC SURGERY | Facility: CLINIC | Age: 48
End: 2018-02-15
Payer: COMMERCIAL

## 2018-02-26 ENCOUNTER — INPATIENT (INPATIENT)
Facility: HOSPITAL | Age: 48
LOS: 1 days | Discharge: HOME | DRG: 310 | End: 2018-02-28
Attending: THORACIC SURGERY (CARDIOTHORACIC VASCULAR SURGERY) | Admitting: THORACIC SURGERY (CARDIOTHORACIC VASCULAR SURGERY)
Payer: COMMERCIAL

## 2018-02-26 VITALS — HEART RATE: 140 BPM

## 2018-02-26 DIAGNOSIS — I48.91 UNSPECIFIED ATRIAL FIBRILLATION: ICD-10-CM

## 2018-02-26 DIAGNOSIS — Z98.89 OTHER SPECIFIED POSTPROCEDURAL STATES: Chronic | ICD-10-CM

## 2018-02-26 DIAGNOSIS — Z95.2 PRESENCE OF PROSTHETIC HEART VALVE: ICD-10-CM

## 2018-02-26 DIAGNOSIS — Z95.2 PRESENCE OF PROSTHETIC HEART VALVE: Chronic | ICD-10-CM

## 2018-02-26 DIAGNOSIS — Z98.890 OTHER SPECIFIED POSTPROCEDURAL STATES: Chronic | ICD-10-CM

## 2018-02-26 LAB
ALBUMIN SERPL ELPH-MCNC: 4.1 G/DL — SIGNIFICANT CHANGE UP (ref 3.3–5)
ALP SERPL-CCNC: 77 U/L — SIGNIFICANT CHANGE UP (ref 40–120)
ALT FLD-CCNC: 16 U/L RC — SIGNIFICANT CHANGE UP (ref 10–45)
ANION GAP SERPL CALC-SCNC: 15 MMOL/L — SIGNIFICANT CHANGE UP (ref 5–17)
APTT BLD: 37.4 SEC — SIGNIFICANT CHANGE UP (ref 27.5–37.4)
AST SERPL-CCNC: 36 U/L — SIGNIFICANT CHANGE UP (ref 10–40)
BASOPHILS # BLD AUTO: 0 K/UL — SIGNIFICANT CHANGE UP (ref 0–0.2)
BASOPHILS NFR BLD AUTO: 0.2 % — SIGNIFICANT CHANGE UP (ref 0–2)
BILIRUB SERPL-MCNC: 0.3 MG/DL — SIGNIFICANT CHANGE UP (ref 0.2–1.2)
BUN SERPL-MCNC: 12 MG/DL — SIGNIFICANT CHANGE UP (ref 7–23)
CALCIUM SERPL-MCNC: 9.9 MG/DL — SIGNIFICANT CHANGE UP (ref 8.4–10.5)
CHLORIDE SERPL-SCNC: 102 MMOL/L — SIGNIFICANT CHANGE UP (ref 96–108)
CK MB CFR SERPL CALC: 1.1 NG/ML — SIGNIFICANT CHANGE UP (ref 0–3.8)
CK SERPL-CCNC: 55 U/L — SIGNIFICANT CHANGE UP (ref 25–170)
CO2 SERPL-SCNC: 20 MMOL/L — LOW (ref 22–31)
CREAT SERPL-MCNC: 0.92 MG/DL — SIGNIFICANT CHANGE UP (ref 0.5–1.3)
D DIMER BLD IA.RAPID-MCNC: 461 NG/ML DDU — HIGH
EOSINOPHIL # BLD AUTO: 0.3 K/UL — SIGNIFICANT CHANGE UP (ref 0–0.5)
EOSINOPHIL NFR BLD AUTO: 3.9 % — SIGNIFICANT CHANGE UP (ref 0–6)
GLUCOSE SERPL-MCNC: 102 MG/DL — HIGH (ref 70–99)
HCG SERPL-ACNC: <2 MIU/ML — LOW (ref 5–24)
HCT VFR BLD CALC: 37.9 % — SIGNIFICANT CHANGE UP (ref 34.5–45)
HGB BLD-MCNC: 12.2 G/DL — SIGNIFICANT CHANGE UP (ref 11.5–15.5)
INR BLD: 2.04 RATIO — HIGH (ref 0.88–1.16)
LYMPHOCYTES # BLD AUTO: 1.5 K/UL — SIGNIFICANT CHANGE UP (ref 1–3.3)
LYMPHOCYTES # BLD AUTO: 17.9 % — SIGNIFICANT CHANGE UP (ref 13–44)
MAGNESIUM SERPL-MCNC: 1.9 MG/DL — SIGNIFICANT CHANGE UP (ref 1.6–2.6)
MCHC RBC-ENTMCNC: 24.8 PG — LOW (ref 27–34)
MCHC RBC-ENTMCNC: 32.3 GM/DL — SIGNIFICANT CHANGE UP (ref 32–36)
MCV RBC AUTO: 76.9 FL — LOW (ref 80–100)
MONOCYTES # BLD AUTO: 0.5 K/UL — SIGNIFICANT CHANGE UP (ref 0–0.9)
MONOCYTES NFR BLD AUTO: 6.3 % — SIGNIFICANT CHANGE UP (ref 2–14)
NEUTROPHILS # BLD AUTO: 6 K/UL — SIGNIFICANT CHANGE UP (ref 1.8–7.4)
NEUTROPHILS NFR BLD AUTO: 71.7 % — SIGNIFICANT CHANGE UP (ref 43–77)
PHOSPHATE SERPL-MCNC: 4.1 MG/DL — SIGNIFICANT CHANGE UP (ref 2.5–4.5)
PLATELET # BLD AUTO: 325 K/UL — SIGNIFICANT CHANGE UP (ref 150–400)
POTASSIUM SERPL-MCNC: 5.3 MMOL/L — SIGNIFICANT CHANGE UP (ref 3.5–5.3)
POTASSIUM SERPL-SCNC: 5.3 MMOL/L — SIGNIFICANT CHANGE UP (ref 3.5–5.3)
PROT SERPL-MCNC: 8.3 G/DL — SIGNIFICANT CHANGE UP (ref 6–8.3)
PROTHROM AB SERPL-ACNC: 22.6 SEC — HIGH (ref 9.8–12.7)
RBC # BLD: 4.93 M/UL — SIGNIFICANT CHANGE UP (ref 3.8–5.2)
RBC # FLD: 15 % — HIGH (ref 10.3–14.5)
SODIUM SERPL-SCNC: 137 MMOL/L — SIGNIFICANT CHANGE UP (ref 135–145)
TROPONIN T SERPL-MCNC: 0.02 NG/ML — SIGNIFICANT CHANGE UP (ref 0–0.06)
WBC # BLD: 8.4 K/UL — SIGNIFICANT CHANGE UP (ref 3.8–10.5)
WBC # FLD AUTO: 8.4 K/UL — SIGNIFICANT CHANGE UP (ref 3.8–10.5)

## 2018-02-26 PROCEDURE — 93010 ELECTROCARDIOGRAM REPORT: CPT

## 2018-02-26 PROCEDURE — 99223 1ST HOSP IP/OBS HIGH 75: CPT | Mod: GC

## 2018-02-26 PROCEDURE — 93306 TTE W/DOPPLER COMPLETE: CPT | Mod: 26

## 2018-02-26 PROCEDURE — 71045 X-RAY EXAM CHEST 1 VIEW: CPT | Mod: 26

## 2018-02-26 PROCEDURE — 99285 EMERGENCY DEPT VISIT HI MDM: CPT | Mod: 25

## 2018-02-26 RX ORDER — SODIUM CHLORIDE 9 MG/ML
3 INJECTION INTRAMUSCULAR; INTRAVENOUS; SUBCUTANEOUS EVERY 8 HOURS
Qty: 0 | Refills: 0 | Status: DISCONTINUED | OUTPATIENT
Start: 2018-02-26 | End: 2018-02-28

## 2018-02-26 RX ORDER — HYDROCORTISONE 20 MG
200 TABLET ORAL ONCE
Qty: 0 | Refills: 0 | Status: COMPLETED | OUTPATIENT
Start: 2018-02-26 | End: 2018-02-26

## 2018-02-26 RX ORDER — HYDROCORTISONE 20 MG
200 TABLET ORAL ONCE
Qty: 0 | Refills: 0 | Status: DISCONTINUED | OUTPATIENT
Start: 2018-02-26 | End: 2018-02-26

## 2018-02-26 RX ORDER — DOCUSATE SODIUM 100 MG
100 CAPSULE ORAL THREE TIMES A DAY
Qty: 0 | Refills: 0 | Status: DISCONTINUED | OUTPATIENT
Start: 2018-02-26 | End: 2018-02-28

## 2018-02-26 RX ORDER — DIPHENHYDRAMINE HCL 50 MG
50 CAPSULE ORAL ONCE
Qty: 0 | Refills: 0 | Status: COMPLETED | OUTPATIENT
Start: 2018-02-26 | End: 2018-02-26

## 2018-02-26 RX ORDER — METOPROLOL TARTRATE 50 MG
50 TABLET ORAL
Qty: 0 | Refills: 0 | Status: DISCONTINUED | OUTPATIENT
Start: 2018-02-26 | End: 2018-02-28

## 2018-02-26 RX ORDER — WARFARIN SODIUM 2.5 MG/1
5 TABLET ORAL ONCE
Qty: 0 | Refills: 0 | Status: COMPLETED | OUTPATIENT
Start: 2018-02-26 | End: 2018-02-26

## 2018-02-26 RX ORDER — ATORVASTATIN CALCIUM 80 MG/1
40 TABLET, FILM COATED ORAL AT BEDTIME
Qty: 0 | Refills: 0 | Status: DISCONTINUED | OUTPATIENT
Start: 2018-02-26 | End: 2018-02-28

## 2018-02-26 RX ORDER — OXYCODONE HYDROCHLORIDE 5 MG/1
5 TABLET ORAL EVERY 4 HOURS
Qty: 0 | Refills: 0 | Status: DISCONTINUED | OUTPATIENT
Start: 2018-02-26 | End: 2018-02-28

## 2018-02-26 RX ORDER — METOPROLOL TARTRATE 50 MG
5 TABLET ORAL ONCE
Qty: 0 | Refills: 0 | Status: COMPLETED | OUTPATIENT
Start: 2018-02-26 | End: 2018-02-26

## 2018-02-26 RX ORDER — SODIUM CHLORIDE 9 MG/ML
1000 INJECTION INTRAMUSCULAR; INTRAVENOUS; SUBCUTANEOUS ONCE
Qty: 0 | Refills: 0 | Status: COMPLETED | OUTPATIENT
Start: 2018-02-26 | End: 2018-02-26

## 2018-02-26 RX ORDER — AMIODARONE HYDROCHLORIDE 400 MG/1
200 TABLET ORAL DAILY
Qty: 0 | Refills: 0 | Status: CANCELLED | OUTPATIENT
Start: 2018-03-03 | End: 2018-02-28

## 2018-02-26 RX ORDER — POLYETHYLENE GLYCOL 3350 17 G/17G
17 POWDER, FOR SOLUTION ORAL AT BEDTIME
Qty: 0 | Refills: 0 | Status: DISCONTINUED | OUTPATIENT
Start: 2018-02-26 | End: 2018-02-28

## 2018-02-26 RX ORDER — AMIODARONE HYDROCHLORIDE 400 MG/1
400 TABLET ORAL THREE TIMES A DAY
Qty: 0 | Refills: 0 | Status: DISCONTINUED | OUTPATIENT
Start: 2018-02-26 | End: 2018-02-28

## 2018-02-26 RX ORDER — AMIODARONE HYDROCHLORIDE 400 MG/1
400 TABLET ORAL THREE TIMES A DAY
Qty: 0 | Refills: 0 | Status: DISCONTINUED | OUTPATIENT
Start: 2018-02-26 | End: 2018-02-26

## 2018-02-26 RX ORDER — PANTOPRAZOLE SODIUM 20 MG/1
40 TABLET, DELAYED RELEASE ORAL
Qty: 0 | Refills: 0 | Status: DISCONTINUED | OUTPATIENT
Start: 2018-02-26 | End: 2018-02-28

## 2018-02-26 RX ORDER — ASPIRIN/CALCIUM CARB/MAGNESIUM 324 MG
81 TABLET ORAL DAILY
Qty: 0 | Refills: 0 | Status: DISCONTINUED | OUTPATIENT
Start: 2018-02-26 | End: 2018-02-28

## 2018-02-26 RX ADMIN — Medication 50 MILLIGRAM(S): at 23:10

## 2018-02-26 RX ADMIN — AMIODARONE HYDROCHLORIDE 400 MILLIGRAM(S): 400 TABLET ORAL at 20:51

## 2018-02-26 RX ADMIN — SODIUM CHLORIDE 1000 MILLILITER(S): 9 INJECTION INTRAMUSCULAR; INTRAVENOUS; SUBCUTANEOUS at 17:37

## 2018-02-26 RX ADMIN — SODIUM CHLORIDE 3 MILLILITER(S): 9 INJECTION INTRAMUSCULAR; INTRAVENOUS; SUBCUTANEOUS at 23:34

## 2018-02-26 RX ADMIN — Medication 50 MILLIGRAM(S): at 20:57

## 2018-02-26 RX ADMIN — Medication 200 MILLIGRAM(S): at 19:52

## 2018-02-26 RX ADMIN — Medication 5 MILLIGRAM(S): at 19:30

## 2018-02-26 NOTE — ED PROVIDER NOTE - MEDICAL DECISION MAKING DETAILS
Hx of CVA 10 yrs ago w/ no residual deficit, MVR 6 wks ago, recent episodes of dizziness and syncope 3 days ago, p/w palpitations, found to be in A-fib at PCP's office, given metoprolol 10mg IV, also was presyncopal today. Plan: Labs, ecg, CTA to r/o PE, reassess.

## 2018-02-26 NOTE — H&P ADULT - REASON FOR ADMISSION
"I have worsening palpitation and fainted  3 days ago" "I have worsening palpitation and fainted x 3 days ago"

## 2018-02-26 NOTE — H&P ADULT - NSHPLABSRESULTS_GEN_ALL_CORE
LABS:                        12.2   8.4   )-----------( 325      ( 26 Feb 2018 17:41 )             37.9     02-26    137  |  102  |  12  ----------------------------<  102<H>  5.3   |  20<L>  |  0.92    Ca    9.9      26 Feb 2018 17:41  Phos  4.1     02-26  Mg     1.9     02-26    TPro  8.3  /  Alb  4.1  /  TBili  0.3  /  DBili  x   /  AST  36  /  ALT  16  /  AlkPhos  77  02-26    PT/INR - ( 26 Feb 2018 17:41 )   PT: 22.6 sec;   INR: 2.04 ratio         PTT - ( 26 Feb 2018 17:41 )  PTT:37.4 sec    CARDIAC MARKERS ( 26 Feb 2018 17:41 )  x     / 0.02 ng/mL / 55 U/L / x     / 1.1 ng/mL

## 2018-02-26 NOTE — ED PROVIDER NOTE - OBJECTIVE STATEMENT
Hx of CVA 10 yrs ago w/ no residual deficit, MVR 6 wks ago, recent episodes of dizziness and syncope 3 days ago, p/w palpitations, found to be in A-fib at PCP's office, given metoprolol 10mg IV, also was presyncopal today. No CP or SOB. No calf pain. On coumadin w/ last level 1.9.   primary care physician: Dr. Etienne

## 2018-02-26 NOTE — ED ADULT NURSE NOTE - OBJECTIVE STATEMENT
47y female c/o rapid heart rate. A&Ox3, neuro intact and tachycardic. Hx of CVA in 2003 with no residual effects, mitral valve replacement 1 month ago on coumadin and aspirin. Patient states dizziness and multiple syncopal episodes over the past few weeks. Visited PMD this morning. PMD discover rapid afib rate 170s-180s. EMS place 20g IV in right hand. EMS administer 5mg IV metoprolol at 1710 and another 5mg IV metoprolol at 1720. Denies chest pain, sob, fever/chills, n/v/d. Lungs clear.

## 2018-02-26 NOTE — H&P ADULT - NSHPPHYSICALEXAM_GEN_ALL_CORE
PHYSICAL EXAM  Vital Signs Last 24 Hrs  T(C): 36.8 (26 Feb 2018 19:19), Max: 36.9 (26 Feb 2018 17:26)  T(F): 98.3 (26 Feb 2018 19:19), Max: 98.4 (26 Feb 2018 17:26)  HR: 128 (26 Feb 2018 19:19) (128 - 140)  BP: 129/98 (26 Feb 2018 19:19) (129/98 - 133/96)  BP(mean): 108 (26 Feb 2018 19:19) (108 - 108)  RR: 25 (26 Feb 2018 19:19) (18 - 25)  SpO2: 100% (26 Feb 2018 19:19) (99% - 100%)    General: WN/WD NAD  Neurology: A&Ox3, nonfocal, GONZALEZ x 4  Head:  Normocephalic, atraumatic  ENT:  Mucosa moist, no ulcerations  Neck:  Supple, no sinuses or palpable masses  Lymphatic:  No palpable cervical, supraclavicular, axillary or inguinal adenopathy  Respiratory: CTA B/L  CV: RRR, S1S2, no murmur  Abdominal: Soft, NT, ND no palpable mass  MSK: No edema, + peripheral pulses, FROM all 4 extremity  Incisions: intact, no erythema or drainage

## 2018-02-26 NOTE — ED ADULT NURSE NOTE - PMH
Rheumatic fever  as a teen, s/p mitral valve repair 1991  TIA (transient ischemic attack)  on Aspirin

## 2018-02-26 NOTE — H&P ADULT - NSHPREVIEWOFSYSTEMS_GEN_ALL_CORE
Review of Systems  GENERAL:  Fevers[] chills[] sweats[] fatigue[] weight loss[] weight gain []                                        NEURO:  parathesias[] seizures []  syncope []  confusion []                                                                                  EYES: glasses[]  blurry vision[]  discharge[] pain[] glaucoma []                                                                            ENMT:  difficulty hearing []  vertigo[]  dysphagia[] epistaxis[] recent dental work []                                      CV:  chest pain[] palpitations[] PALACIOS [] diaphoresis [] edema[]                                                                                             RESPIRATORY:  wheezing[] SOB[] cough [] sputum[] hemoptysis[]                                                                    GI:  nausea[]  vomiting []  diarrhea[] constipation [] melena []                                                                        : hematuria[ ]  dysuria[ ] urgency[] incontinence[]                                                                                              MUSCULOSKELETAL:  arthritis[ ]  joint swelling [ ] muscle weakness [ ]                                                                  SKIN/BREAST:  rash[ ] itching [ ]  hair loss[ ] masses[ ]                                                                                                PSYCH:  dementia [ ] depression [ ] anxiety[ ]                                                                                                                  HEME/LYMPH:  bruises easily[ ] enlarged lymph nodes[ ] tender lymph nodes[ ]                                                 ENDOCRINE:  cold intolerance[ ] heat intolerance[ ] polydipsia[ ] Review of Systems  GENERAL:  Fevers[] chills[] sweats[] fatigue[] weight loss[] weight gain []                                        NEURO:  parathesias[] seizures []  syncope [X]  confusion []                                                                                  EYES: glasses[]  blurry vision[]  discharge[] pain[] glaucoma []                                                                            ENMT:  difficulty hearing []  vertigo[]  dysphagia[] epistaxis[] recent dental work []                                      CV:  chest pain[] palpitations[X] PALACIOS [] diaphoresis [] edema[]                                                                                             RESPIRATORY:  wheezing[] SOB[X] cough [] sputum[] hemoptysis[]                                                                    GI:  nausea[]  vomiting []  diarrhea[] constipation [] melena []                                                                        : hematuria[ ]  dysuria[ ] urgency[] incontinence[]                                                                                              MUSCULOSKELETAL:  arthritis[ ]  joint swelling [ ] muscle weakness [ ]                                                                  SKIN/BREAST:  rash[ ] itching [ ]  hair loss[ ] masses[ ]                                                                                                PSYCH:  dementia [ ] depression [ ] anxiety[ ]                                                                                                                  HEME/LYMPH:  bruises easily[ ] enlarged lymph nodes[ ] tender lymph nodes[ ]                                                 ENDOCRINE:  cold intolerance[ ] heat intolerance[ ] polydipsia[ ]

## 2018-02-26 NOTE — ED PROVIDER NOTE - PHYSICAL EXAMINATION
Gen: NAD  Eyes:  sclerae white, no icterus  ENT: Moist mucous membranes. No exudates  Neck: supple, no LAD, mass or goiter, trachea midline  CV: Tachy, irregular, synthetic valve  Pulm: Clear to auscultation bilaterally. No wheezes, rales, or rhonchi  Abd: BS+, nondistended, No tenderness to palpation  Musculoskeletal:  No edema  Skin: no lesions or scars noted  Psych: mood good, affect full range and congruent with mood.  Neurologic: AAOx3

## 2018-02-26 NOTE — H&P ADULT - PSH
H/O right knee surgery    S/P mitral valve repair  1991 H/O right knee surgery    S/P mitral valve repair  1991  S/P MVR (mitral valve replacement)  Re op MVR (T)

## 2018-02-26 NOTE — ED PROVIDER NOTE - PROGRESS NOTE DETAILS
attending Carola: discussed with CT surgery NP who requests admission under Dr. Vieyra to [address] [place] [address]. Expressed concern for PE. Will get CTA in ED. CT surgery will arrange for TTE by cardiology eval for effusion.

## 2018-02-26 NOTE — CONSULT NOTE ADULT - SUBJECTIVE AND OBJECTIVE BOX
Patient seen and evaluated at bedside    Chief Complaint: palpitations    HPI:  47F with CVA (10 yrs ago with no residual), MR 2/2 rheumatic heart disease, s/p MV repair (1991), and s/p re op MVR (T) (01/2018) on Coumadin who presented to [facility] via EMS from PCP office with new onset Afib with RVR. She was noted to be in AF with RVR in PCP's office and was given metoprolol 10mg IV Patient reports feeling palpitations with associated lightheadedness and dizzines 3 days ago. She denies CP, SOB, PALACIOS, N/V/D, HA, fevers or chills at this time. Patient claims she has never had issues with AF before.      PMH:   TIA (transient ischemic attack)  Rheumatic fever      PSH:   S/P MVR (mitral valve replacement)  H/O right knee surgery  S/P mitral valve repair      Medications:   amiodarone    Tablet 400 milliGRAM(s) Oral three times a day  aspirin enteric coated 81 milliGRAM(s) Oral daily  atorvastatin 40 milliGRAM(s) Oral at bedtime  docusate sodium 100 milliGRAM(s) Oral three times a day  metoprolol     tartrate 50 milliGRAM(s) Oral two times a day  oxyCODONE    IR 5 milliGRAM(s) Oral every 4 hours PRN  pantoprazole    Tablet 40 milliGRAM(s) Oral before breakfast  polyethylene glycol 3350 17 Gram(s) Oral at bedtime  sodium chloride 0.9% lock flush 3 milliLiter(s) IV Push every 8 hours  warfarin 5 milliGRAM(s) Oral once      Allergies:  IV Contrast (Urticaria)      FAMILY HISTORY:  Family history of acute myocardial infarction (Father)      Review of Systems:  REVIEW OF SYSTEMS:    CONSTITUTIONAL: No weakness, fevers or chills  EYES/ENT: No visual changes;  No dysphagia  NECK: No pain or stiffness  RESPIRATORY: No cough, wheezing, hemoptysis; No shortness of breath  CARDIOVASCULAR: No chest pain or palpitations; No lower extremity edema  GASTROINTESTINAL: No abdominal or epigastric pain. No nausea, vomiting, or hematemesis; No diarrhea or constipation. No melena or hematochezia.  BACK: No back pain  GENITOURINARY: No dysuria, frequency or hematuria  NEUROLOGICAL: No numbness or weakness  SKIN: No itching, burning, rashes, or lesions   All other review of systems is negative unless indicated above.    Physical Exam:  T(F): 98.3 (02-26), Max: 98.4 (02-26)  HR: 122 (02-26) (122 - 140)  BP: 134/92 (02-26) (129/98 - 136/102)  RR: 18 (02-26)  SpO2: 99% (02-26)  GENERAL: No acute distress, well-developed  HEAD:  Atraumatic, Normocephalic  ENT: EOMI, No JVD, moist mucosa  CHEST/LUNG: Clear to auscultation bilaterally; No wheeze, equal breath sounds bilaterally   HEART: Irregular rhythm; No murmurs, rubs, or gallops  ABDOMEN: Soft, Nontender, Nondistended; Bowel sounds present  EXTREMITIES:  No clubbing, cyanosis, or edema  PSYCH: Nl behavior, nl affect  NEUROLOGY: AAOx3, non-focal    Cardiovascular Diagnostic Testing:    ECG: Personally reviewed  Afib, , normal axis and intervals    Echo:  < from: Transthoracic Echocardiogram (01.22.18 @ 15:55) >  1. An annuloplasty ring is present in the mitral position.  Mitral annular calcification and calcified mitral leaflets  with decreased diastolic opening. Mild mitral  regurgitation.  Peak mitral valve gradient equals 32 mm Hg,  mean transmitral valve gradient equals 13 mm Hg, estimated  mitral valve area equals 1.5 sqcm (by pressure half time  equation), consistent with moderate mitral stenosis.  2. Mildly dilated left atrium.  LA volume index = 39 cc/m2.  3. Normal left ventricular internal dimensions and wall  thicknesses.  4. Normal left ventricular systolic function. No segmental  wall motion abnormalities.  5. Normal right ventricular size and function.  6. Estimated right ventricular systolic pressure equals 46  mm Hg, assuming right atrial pressure equals 8 mm Hg,  consistent with mild pulmonary hypertension.  7. Normal tricuspid valve. Moderate tricuspid  regurgitation.    < end of copied text >    Cath:  < from: Cardiac Cath Lab - Adult (01.22.18 @ 09:15) >  CORONARY VESSELS: The coronary circulation is right dominant.  LM:   --  LM: Normal.  LAD:   --  LAD: Normal.  CX:   --  Circumflex: Normal.  RCA:   --  RCA: Normal.  COMPLICATIONS: There were no complications.    < end of copied text >      Labs: Personally reviewed                        12.2   8.4   )-----------( 325      ( 26 Feb 2018 17:41 )             37.9     02-26    137  |  102  |  12  ----------------------------<  102<H>  5.3   |  20<L>  |  0.92    Ca    9.9      26 Feb 2018 17:41  Phos  4.1     02-26  Mg     1.9     02-26    TPro  8.3  /  Alb  4.1  /  TBili  0.3  /  DBili  x   /  AST  36  /  ALT  16  /  AlkPhos  77  02-26    PT/INR - ( 26 Feb 2018 17:41 )   PT: 22.6 sec;   INR: 2.04 ratio         PTT - ( 26 Feb 2018 17:41 )  PTT:37.4 sec  CARDIAC MARKERS ( 26 Feb 2018 17:41 )  x     / 0.02 ng/mL / 55 U/L / x     / 1.1 ng/mL      A/P:  47F with CVA (10 yrs ago with no residual), MR 2/2 rheumatic heart disease, s/p MV repair (1991), and s/p re op MVR (T) (01/2018) on Coumadin who presents for new AF with RVR.    AF with RVR. New onset. MVR re op last month on home coumadin with INR 2.0.  - She is getting amio load and BB was increased  - Will consider DCCV if patient still in AF  - To be staffed with attending    Rogers Arce MD  Cardiology Fellow [id number]

## 2018-02-26 NOTE — H&P ADULT - PROBLEM SELECTOR PLAN 2
Start Amio load   Increase lopressor 50 mg PO BID  Activity as tolerated   Continue with PUD prophylaxis   Encourage Chest PT and Pulmonary toileting   TTE   Continue with AC therapy   Daily PT/ INR   Plan of care discussed with attending

## 2018-02-26 NOTE — H&P ADULT - PROBLEM SELECTOR PLAN 1
Admit to 2 [facility] Telemetry floor   Initiate PO Amio load   Increase lopressor 50 mg PO BID   Activity as tolerated   Daily shower   Continue with AC therapy Coumadin 5 mg po tonight   Daily PT/ INR   EP consult in AM   TTE   Pending CTA if D-Dimmer is elevated   Plan of care discussed with attending

## 2018-02-26 NOTE — ED PROVIDER NOTE - ATTENDING CONTRIBUTION TO CARE
attending Carola: pt with prior CVA 10 yrs ago w/ no residual, s/p MVR 6 wks ago, recent episodes of dizziness and syncope 3 days ago, p/w palpitations, found to be in A-fib at PCP's office, given metoprolol 10mg IV prior to arrival. +presyncopal today. Denies CP, SOB, calf pain. On coumadin w/ last INR subtherapeutic. Will obtain ekg, place on tele, labs including cardiac enzymes, CTA to r/o PE, discuss with CT surgery and admit.

## 2018-02-26 NOTE — H&P ADULT - NSHPSOCIALHISTORY_GEN_ALL_CORE
SOCIAL HISTORY:  Smoker: [ ] Yes  [ ] No        PACK YEARS:                         WHEN QUIT?  ETOH use: [ ] Yes  [ ] No              FREQUENCY / QUANTITY:  Ilicit Drug use:  [ ] Yes  [ ] No  Occupation:  Live with:  Assist device use: SOCIAL HISTORY:  Smoker: [ ] Yes  [x ] No        PACK YEARS:                         WHEN QUIT?  ETOH use: [ ] Yes  [ x] No              FREQUENCY / QUANTITY:  Ilicit Drug use:  [ ] Yes  [X ] No  Occupation:  Live with: Lives home with spouse   Assist device use:

## 2018-02-26 NOTE — H&P ADULT - HISTORY OF PRESENT ILLNESS
History of Present Illness:    46 yo female with PMHx of CVA (10 yrs ago with no residual), s/p right knee surgery, MR 2/2 rheumatic heart disease, s/p MV repair (1991), and s/p MVR (T) on Coumadin who presented to [facility] via EMS from PCP office with new onset Afib with RVR.  Patient reports c/o palpitation with syncopal episodes 3 days ago.  In ER patient was pre medicated for IV contrast allergy for Chest CTA to evaluate for PE.  Patient readmitted for further work up and cardiac management. History of Present Illness:    48 yo female with PMHx of CVA (10 yrs ago with no residual), s/p right knee surgery, MR 2/2 rheumatic heart disease, s/p MV repair (1991), and s/p MVR (T) on Coumadin who presented to [facility] via EMS from PCP office with new onset Afib with RVR.  Patient reports c/o palpitation with syncopal episodes 3 days ago.  In ER patient was pre medicated for IV contrast allergy for Chest CTA to evaluate for PE.  Denies CP, palpitation, SOB, PALACIOS, N/V/D, HA, ferver or chills at this time.  Patient readmitted for further work up and cardiac management. History of Present Illness:    46 yo female with PMHx of CVA (10 yrs ago with no residual), s/p right knee surgery, MR 2/2 rheumatic heart disease, s/p MV repair (1991), and s/p 1/23/18 Re op MVR (T) on Coumadin who presented to [facility] via EMS from PCP office with new onset Afib with RVR.  Patient reports c/o palpitation with syncopal episodes 3 days ago.  In ER patient was pre medicated for IV contrast allergy for Chest CTA to evaluate for PE.  Denies CP, palpitation, SOB, PALACIOS, N/V/D, HA, ferver or chills at this time.  Patient readmitted for further work up and cardiac management.

## 2018-02-27 LAB
ANION GAP SERPL CALC-SCNC: 14 MMOL/L — SIGNIFICANT CHANGE UP (ref 5–17)
BUN SERPL-MCNC: 14 MG/DL — SIGNIFICANT CHANGE UP (ref 7–23)
CALCIUM SERPL-MCNC: 9.2 MG/DL — SIGNIFICANT CHANGE UP (ref 8.4–10.5)
CHLORIDE SERPL-SCNC: 105 MMOL/L — SIGNIFICANT CHANGE UP (ref 96–108)
CO2 SERPL-SCNC: 20 MMOL/L — LOW (ref 22–31)
CREAT SERPL-MCNC: 0.99 MG/DL — SIGNIFICANT CHANGE UP (ref 0.5–1.3)
GLUCOSE SERPL-MCNC: 165 MG/DL — HIGH (ref 70–99)
HCT VFR BLD CALC: 32.6 % — LOW (ref 34.5–45)
HGB BLD-MCNC: 10.5 G/DL — LOW (ref 11.5–15.5)
INR BLD: 2.02 RATIO — HIGH (ref 0.88–1.16)
MCHC RBC-ENTMCNC: 24.8 PG — LOW (ref 27–34)
MCHC RBC-ENTMCNC: 32.3 GM/DL — SIGNIFICANT CHANGE UP (ref 32–36)
MCV RBC AUTO: 76.8 FL — LOW (ref 80–100)
PLATELET # BLD AUTO: 313 K/UL — SIGNIFICANT CHANGE UP (ref 150–400)
POTASSIUM SERPL-MCNC: 4.1 MMOL/L — SIGNIFICANT CHANGE UP (ref 3.5–5.3)
POTASSIUM SERPL-SCNC: 4.1 MMOL/L — SIGNIFICANT CHANGE UP (ref 3.5–5.3)
PROTHROM AB SERPL-ACNC: 22.3 SEC — HIGH (ref 9.8–12.7)
RBC # BLD: 4.24 M/UL — SIGNIFICANT CHANGE UP (ref 3.8–5.2)
RBC # FLD: 14.9 % — HIGH (ref 10.3–14.5)
SODIUM SERPL-SCNC: 139 MMOL/L — SIGNIFICANT CHANGE UP (ref 135–145)
WBC # BLD: 6.8 K/UL — SIGNIFICANT CHANGE UP (ref 3.8–10.5)
WBC # FLD AUTO: 6.8 K/UL — SIGNIFICANT CHANGE UP (ref 3.8–10.5)

## 2018-02-27 PROCEDURE — 99233 SBSQ HOSP IP/OBS HIGH 50: CPT | Mod: GC

## 2018-02-27 PROCEDURE — 71275 CT ANGIOGRAPHY CHEST: CPT | Mod: 26

## 2018-02-27 PROCEDURE — 70450 CT HEAD/BRAIN W/O DYE: CPT | Mod: 26

## 2018-02-27 RX ORDER — WARFARIN SODIUM 2.5 MG/1
5 TABLET ORAL ONCE
Qty: 0 | Refills: 0 | Status: COMPLETED | OUTPATIENT
Start: 2018-02-27 | End: 2018-02-27

## 2018-02-27 RX ORDER — ALPRAZOLAM 0.25 MG
0.25 TABLET ORAL AT BEDTIME
Qty: 0 | Refills: 0 | Status: DISCONTINUED | OUTPATIENT
Start: 2018-02-27 | End: 2018-02-27

## 2018-02-27 RX ADMIN — WARFARIN SODIUM 5 MILLIGRAM(S): 2.5 TABLET ORAL at 21:54

## 2018-02-27 RX ADMIN — SODIUM CHLORIDE 3 MILLILITER(S): 9 INJECTION INTRAMUSCULAR; INTRAVENOUS; SUBCUTANEOUS at 06:00

## 2018-02-27 RX ADMIN — SODIUM CHLORIDE 3 MILLILITER(S): 9 INJECTION INTRAMUSCULAR; INTRAVENOUS; SUBCUTANEOUS at 21:57

## 2018-02-27 RX ADMIN — Medication 100 MILLIGRAM(S): at 05:59

## 2018-02-27 RX ADMIN — AMIODARONE HYDROCHLORIDE 400 MILLIGRAM(S): 400 TABLET ORAL at 13:45

## 2018-02-27 RX ADMIN — ATORVASTATIN CALCIUM 40 MILLIGRAM(S): 80 TABLET, FILM COATED ORAL at 01:04

## 2018-02-27 RX ADMIN — Medication 50 MILLIGRAM(S): at 05:59

## 2018-02-27 RX ADMIN — Medication 100 MILLIGRAM(S): at 01:03

## 2018-02-27 RX ADMIN — Medication 81 MILLIGRAM(S): at 13:45

## 2018-02-27 RX ADMIN — SODIUM CHLORIDE 3 MILLILITER(S): 9 INJECTION INTRAMUSCULAR; INTRAVENOUS; SUBCUTANEOUS at 13:42

## 2018-02-27 RX ADMIN — AMIODARONE HYDROCHLORIDE 400 MILLIGRAM(S): 400 TABLET ORAL at 05:58

## 2018-02-27 RX ADMIN — POLYETHYLENE GLYCOL 3350 17 GRAM(S): 17 POWDER, FOR SOLUTION ORAL at 21:54

## 2018-02-27 RX ADMIN — WARFARIN SODIUM 5 MILLIGRAM(S): 2.5 TABLET ORAL at 01:03

## 2018-02-27 RX ADMIN — AMIODARONE HYDROCHLORIDE 400 MILLIGRAM(S): 400 TABLET ORAL at 21:54

## 2018-02-27 RX ADMIN — Medication 0.25 MILLIGRAM(S): at 22:30

## 2018-02-27 RX ADMIN — Medication 100 MILLIGRAM(S): at 21:54

## 2018-02-27 RX ADMIN — PANTOPRAZOLE SODIUM 40 MILLIGRAM(S): 20 TABLET, DELAYED RELEASE ORAL at 06:00

## 2018-02-27 RX ADMIN — Medication 50 MILLIGRAM(S): at 17:33

## 2018-02-27 RX ADMIN — ATORVASTATIN CALCIUM 40 MILLIGRAM(S): 80 TABLET, FILM COATED ORAL at 21:54

## 2018-02-27 NOTE — PROGRESS NOTE ADULT - SUBJECTIVE AND OBJECTIVE BOX
Patient seen and examined at bedside on 2 Coh 261W, no family at bedside     Review Of Systems: No chest pain, shortness of breath, or palpitations. The pt reports that on the Thursday PTA she had a syncopal episode (does not remember hitting the floor, spilled a pill bottle) and had palpitations for the entire day a/w lightheadedness. She states these symptoms returned on the day of admission - currently denies palpitations            Current Meds:  amiodarone    Tablet 400 milliGRAM(s) Oral three times a day  aspirin enteric coated 81 milliGRAM(s) Oral daily  atorvastatin 40 milliGRAM(s) Oral at bedtime  docusate sodium 100 milliGRAM(s) Oral three times a day  metoprolol     tartrate 50 milliGRAM(s) Oral two times a day  oxyCODONE    IR 5 milliGRAM(s) Oral every 4 hours PRN  pantoprazole    Tablet 40 milliGRAM(s) Oral before breakfast  polyethylene glycol 3350 17 Gram(s) Oral at bedtime  sodium chloride 0.9% lock flush 3 milliLiter(s) IV Push every 8 hours  warfarin 5 milliGRAM(s) Oral once    PAST MEDICAL & SURGICAL HISTORY:  TIA  Rheumatic fever, s/p mitral valve repair 1991, s/p bioMVR re-OP 1/23/2018    Vitals:  T(F): 98 (02-27), Max: 98.4 (02-26)  HR: 80 (02-27) (76 - 140)  BP: 107/68 (02-27) (107/68 - 136/102)  RR: 18 (02-27)  SpO2: 99% (on RA 27 Feb 2018 07:01  -  27 Feb 2018 12:14  --------------------------------------------------------  IN: 240 mL / OUT: 0 mL / NET: 240 mL    Physical Exam:  Appearance: No acute distress; well appearing, breathing comfortably on RA  Eyes: PERRL, EOMI, pink conjunctiva  HENT: Normal oral muscosa  Cardiovascular: RRR, S1, S2, no murmurs, rubs, or gallops; no edema; no JVD, sternotomy site C/D/I - no dressing over the area  Respiratory: Clear to auscultation bilaterally  Gastrointestinal: soft, non-tender, non-distended with normal bowel sounds  Musculoskeletal: No clubbing; no joint deformity   Neurologic: Non-focal  Lymphatic: No lymphadenopathy  Psychiatry: AAOx3, mood & affect appropriate  Skin: No rashes, ecchymoses, or cyanosis                          10.5   6.8   )-----------( 313      ( 27 Feb 2018 05:47 )             32.6     02-27    139  |  105  |  14  ----------------------------<  165<H>  4.1   |  20<L>  |  0.99    Ca    9.2      27 Feb 2018 05:47  Phos  4.1     02-26  Mg     1.9     02-26    TPro  8.3  /  Alb  4.1  /  TBili  0.3  /  DBili  x   /  AST  36  /  ALT  16  /  AlkPhos  77  02-26    PT/INR - ( 27 Feb 2018 05:47 )   PT: 22.3 sec;   INR: 2.02 ratio    PTT - ( 26 Feb 2018 17:41 )  PTT:37.4 sec    CARDIAC MARKERS ( 26 Feb 2018 17:41 )  x     / 0.02 ng/mL / 55 U/L / x     / 1.1 ng/mL    TSH: 0.79 (from 1/22/2018)    Tele: since being on 2 Coh sinus 70s-80s    Echo: < from: Transthoracic Echocardiogram (02.26.18 @ 20:19) >  Dimensions:    Normal Values:  LA:     4.2    2.0 - 4.0 cm  Ao:     3.3    2.0 - 3.8 cm  SEPTUM: 1.2    0.6 - 1.2 cm  PWT:  0.9    0.6 - 1.1 cm  LVIDd:  3.3    3.0 - 5.6 cm  LVIDs:  2.1    1.8 - 4.0 cm  Derived variables:  LVMI: 59 g/m2  RWT: 0.54  Fractional short: 36 %  EF (Teicholtz): 67 %  Doppler Peak Velocity (m/sec): MV=1.9 AoV=0.9  ------------------------------------------------------------------------  Observations: Mitral Valve: Bioprosthetic mitral valve replacement. Peak mitral valve gradient equals 15 mm Hg, mean transmitral valve gradient equals 6 mm Hg, which is elevated even in the setting of a bioprosthetic mitral valve replacement. Aortic Valve/Aorta: Normal trileaflet aortic valve. Peak transaortic valve gradient equals 4 mm Hg. Peak left ventricular outflow tract gradient equals 1 mm Hg. Aortic Root: 3.3 cm. Left Atrium: Normal left atrium. Left Ventricle: Left ventricular ejection fraction, by visual estimation, 55-60%.  Endocardium not well visualized.  Normal left ventricular internal dimensions and wall thicknesses. Right Heart: Right atrium not well visualized. The right ventricle is not well visualized. Normal tricuspid valve. Normal pulmonic valve. Pericardium/Pleura: Normal pericardium with no pericardial effusion. Hemodynamic: Estimated right atrial pressure is 8 mm Hg. Estimated right ventricular systolic pressure equals 21 mm Hg, assuming right atrial pressure equals 8 mm Hg, consistent with normal pulmonary pressures. Conclusions: 1. Normal left atrium. 2. Left ventricular ejection fraction, by visual estimation, is 50-55%. Endocardium not well visualized. 3. Right atrium not well visualized. 4. The right ventricle is not well visualized. 5. Normally functioning bioprosthetic mitral valve replacement. 6. Normalpericardium with no pericardial effusion. < end of copied text >    Imaging: CTA Chest: < from: CT Angio Chest w/ IV Cont (02.27.18 @ 00:36) > IMPRESSION:  No pulmonary embolism Small bilateral pleural effusions with adjacent areas of consolidation < end of copied text > Patient seen and examined at bedside on 2 Coh 261W, no family at bedside     Outpatient Cardiologist: Dr. AL Santana    Review Of Systems: No chest pain, shortness of breath, or palpitations. The pt reports that on the Thursday PTA she had a syncopal episode (does not remember hitting the floor, spilled a pill bottle) and had palpitations for the entire day a/w lightheadedness. She states these symptoms returned on the day of admission - currently denies palpitations            Current Meds:  amiodarone    Tablet 400 milliGRAM(s) Oral three times a day  aspirin enteric coated 81 milliGRAM(s) Oral daily  atorvastatin 40 milliGRAM(s) Oral at bedtime  docusate sodium 100 milliGRAM(s) Oral three times a day  metoprolol     tartrate 50 milliGRAM(s) Oral two times a day  oxyCODONE    IR 5 milliGRAM(s) Oral every 4 hours PRN  pantoprazole    Tablet 40 milliGRAM(s) Oral before breakfast  polyethylene glycol 3350 17 Gram(s) Oral at bedtime  sodium chloride 0.9% lock flush 3 milliLiter(s) IV Push every 8 hours  warfarin 5 milliGRAM(s) Oral once    PAST MEDICAL & SURGICAL HISTORY:  TIA  Rheumatic fever, s/p mitral valve repair 1991, s/p bioMVR re-OP 1/23/2018    Vitals:  T(F): 98 (02-27), Max: 98.4 (02-26)  HR: 80 (02-27) (76 - 140)  BP: 107/68 (02-27) (107/68 - 136/102)  RR: 18 (02-27)  SpO2: 99% (on RA    27 Feb 2018 07:01  -  27 Feb 2018 12:14  --------------------------------------------------------  IN: 240 mL / OUT: 0 mL / NET: 240 mL    Physical Exam:  Appearance: No acute distress; well appearing, breathing comfortably on RA  Eyes: PERRL, EOMI, pink conjunctiva  HENT: Normal oral muscosa  Cardiovascular: RRR, S1, S2, no murmurs, rubs, or gallops; no edema; no JVD, sternotomy site C/D/I - no dressing over the area  Respiratory: Clear to auscultation bilaterally  Gastrointestinal: soft, non-tender, non-distended with normal bowel sounds  Musculoskeletal: No clubbing; no joint deformity   Neurologic: Non-focal  Lymphatic: No lymphadenopathy  Psychiatry: AAOx3, mood & affect appropriate  Skin: No rashes, ecchymoses, or cyanosis                          10.5   6.8   )-----------( 313      ( 27 Feb 2018 05:47 )             32.6     02-27    139  |  105  |  14  ----------------------------<  165<H>  4.1   |  20<L>  |  0.99    Ca    9.2      27 Feb 2018 05:47  Phos  4.1     02-26  Mg     1.9     02-26    TPro  8.3  /  Alb  4.1  /  TBili  0.3  /  DBili  x   /  AST  36  /  ALT  16  /  AlkPhos  77  02-26    PT/INR - ( 27 Feb 2018 05:47 )   PT: 22.3 sec;   INR: 2.02 ratio    PTT - ( 26 Feb 2018 17:41 )  PTT:37.4 sec    CARDIAC MARKERS ( 26 Feb 2018 17:41 )  x     / 0.02 ng/mL / 55 U/L / x     / 1.1 ng/mL    TSH: 0.79 (from 1/22/2018)    Tele: since being on 2 Coh sinus 70s-80s    Echo: < from: Transthoracic Echocardiogram (02.26.18 @ 20:19) >  Dimensions:    Normal Values:  LA:     4.2    2.0 - 4.0 cm  Ao:     3.3    2.0 - 3.8 cm  SEPTUM: 1.2    0.6 - 1.2 cm  PWT:  0.9    0.6 - 1.1 cm  LVIDd:  3.3    3.0 - 5.6 cm  LVIDs:  2.1    1.8 - 4.0 cm  Derived variables:  LVMI: 59 g/m2  RWT: 0.54  Fractional short: 36 %  EF (Teicholtz): 67 %  Doppler Peak Velocity (m/sec): MV=1.9 AoV=0.9  ------------------------------------------------------------------------  Observations: Mitral Valve: Bioprosthetic mitral valve replacement. Peak mitral valve gradient equals 15 mm Hg, mean transmitral valve gradient equals 6 mm Hg, which is elevated even in the setting of a bioprosthetic mitral valve replacement. Aortic Valve/Aorta: Normal trileaflet aortic valve. Peak transaortic valve gradient equals 4 mm Hg. Peak left ventricular outflow tract gradient equals 1 mm Hg. Aortic Root: 3.3 cm. Left Atrium: Normal left atrium. Left Ventricle: Left ventricular ejection fraction, by visual estimation, 55-60%.  Endocardium not well visualized.  Normal left ventricular internal dimensions and wall thicknesses. Right Heart: Right atrium not well visualized. The right ventricle is not well visualized. Normal tricuspid valve. Normal pulmonic valve. Pericardium/Pleura: Normal pericardium with no pericardial effusion. Hemodynamic: Estimated right atrial pressure is 8 mm Hg. Estimated right ventricular systolic pressure equals 21 mm Hg, assuming right atrial pressure equals 8 mm Hg, consistent with normal pulmonary pressures. Conclusions: 1. Normal left atrium. 2. Left ventricular ejection fraction, by visual estimation, is 50-55%. Endocardium not well visualized. 3. Right atrium not well visualized. 4. The right ventricle is not well visualized. 5. Normally functioning bioprosthetic mitral valve replacement. 6. Normalpericardium with no pericardial effusion. < end of copied text >    Imaging: CTA Chest: < from: CT Angio Chest w/ IV Cont (02.27.18 @ 00:36) > IMPRESSION:  No pulmonary embolism Small bilateral pleural effusions with adjacent areas of consolidation < end of copied text >

## 2018-02-27 NOTE — CONSULT NOTE ADULT - ASSESSMENT
46 yo female with PMHx of CVA (10 yrs ago with no residual), s/p right knee surgery, MR secondary to rheumatic heart disease, s/p MV commissurotomy (1991), and s/p 1/23/18 Re op MVR (T) on Coumadin who presented with rapid atrial fibrillation 48 yo female with PMHx of CVA (10 yrs ago with no residual), s/p right knee surgery, MR secondary to rheumatic heart disease, s/p MV commissurotomy (1991), and s/p 1/23/18 Re op MVR (T) on Coumadin who presented with rapid atrial fibrillation.  Patient converted overnight to NSR.  Now patients HR in the 70's to 80's.  On amiodarone load 400 mg tid.  Decrease to 200 mg once load of 5 gm is complete.  Repeat ECG since now in NSR

## 2018-02-27 NOTE — PROGRESS NOTE ADULT - SUBJECTIVE AND OBJECTIVE BOX
VITAL SIGNS-Tele: [id number]    Vital Signs Last 24 Hrs  T(C): 36.8 (02-27-18 @ 12:23), Max: 36.9 (02-26-18 @ 17:26)  HR: 71 (02-27-18 @ 12:23) (71 - 140)  BP: 106/68 (02-27-18 @ 12:23) (106/68 - 136/102)  SpO2: 100% (02-27-18 @ 12:23) (98% - 100%)         02-26 @ 07:01  -  02-27 @ 07:00  --------------------------------------------------------  IN: 240 mL / OUT: 0 mL / NET: 240 mL    02-27 @ 07:01  -  02-27 @ 12:45  --------------------------------------------------------  IN: 540 mL / OUT: 0 mL / NET: 540 mL    Daily Height in cm: 170.18 (27 Feb 2018 01:58)    Daily     Coumadin    [ x] YES          [  ]      NO         Reason:     MVR-t  PHYSICAL EXAM:  Neurology: alert and oriented x 3, nonfocal, no gross deficits  CV : S1S2  Sternal Wound :  CDI , Stable  Lungs: CTA  Abdomen: soft, nontender, nondistended, positive bowel sounds, last bowel movement         Extremities:     no edema, no calf tenderness    amiodarone    Tablet 400 milliGRAM(s) Oral three times a day  aspirin enteric coated 81 milliGRAM(s) Oral daily  atorvastatin 40 milliGRAM(s) Oral at bedtime  docusate sodium 100 milliGRAM(s) Oral three times a day  metoprolol     tartrate 50 milliGRAM(s) Oral two times a day  oxyCODONE    IR 5 milliGRAM(s) Oral every 4 hours PRN  pantoprazole    Tablet 40 milliGRAM(s) Oral before breakfast  polyethylene glycol 3350 17 Gram(s) Oral at bedtime  sodium chloride 0.9% lock flush 3 milliLiter(s) IV Push every 8 hours  warfarin 5 milliGRAM(s) Oral once    Physical Therapy Rec:   Home  [x]   Home w/ PT  [  ]  Rehab  [  ]  Discussed with Cardiothoracic Team at AM rounds.

## 2018-02-27 NOTE — CONSULT NOTE ADULT - SUBJECTIVE AND OBJECTIVE BOX
CHIEF COMPLAINT: Rapid atrial fibrillation    HPI:  History of Present Illness:    46 yo female with PMHx of CVA (10 yrs ago with no residual), s/p right knee surgery, MR secondary to rheumatic heart disease, s/p MV commissurotomy (1991), and s/p 1/23/18 Re op MVR (T) on Coumadin who presented to [facility] via EMS from our office with new onset Afib with RVR.  Patient reports c/o palpitation with syncopal episodes 3 days ago.  In ER patient was pre medicated for IV contrast allergy for Chest CTA to evaluate for PE.  Denies CP, palpitation, SOB, PALACIOS, N/V/D, HA, fever or chills at this time.  Patient readmitted for further work up and cardiac management. (26 Feb 2018 19:45)      PAST MEDICAL & SURGICAL HISTORY:  TIA (transient ischemic attack): on Aspirin  Rheumatic fever: as a teen, s/p mitral valve repair 1991  S/P MVR (mitral valve replacement): Re op MVR (T)  H/O right knee surgery  S/P mitral valve repair: 1991      Allergies    IV Contrast (Urticaria)    SOCIAL HISTORY    Smoking Hx:  ETOH Hx:  Marital Status:  Occupational Hx:    FAMILY HISTORY:  Family history of acute myocardial infarction (Father)      MEDICATIONS:  amiodarone    Tablet 400 milliGRAM(s) Oral three times a day  aspirin enteric coated 81 milliGRAM(s) Oral daily  atorvastatin 40 milliGRAM(s) Oral at bedtime  docusate sodium 100 milliGRAM(s) Oral three times a day  metoprolol     tartrate 50 milliGRAM(s) Oral two times a day  oxyCODONE    IR 5 milliGRAM(s) Oral every 4 hours PRN  pantoprazole    Tablet 40 milliGRAM(s) Oral before breakfast  polyethylene glycol 3350 17 Gram(s) Oral at bedtime  sodium chloride 0.9% lock flush 3 milliLiter(s) IV Push every 8 hours      REVIEW OF SYSTEMS:    CONSTITUTIONAL: No weakness, fevers or chills  EYES/ENT: No visual changes;  No vertigo or throat pain   NECK: No pain or stiffness  RESPIRATORY: No cough, wheezing, hemoptysis; No shortness of breath  CARDIOVASCULAR: No chest pain or palpitations  GASTROINTESTINAL: No abdominal or epigastric pain. No nausea, vomiting, or hematemesis; No diarrhea or constipation. No melena or hematochezia.  GENITOURINARY: No dysuria, frequency or hematuria  NEUROLOGICAL: No numbness or weakness  SKIN: No itching, burning, rashes, or lesions   All other review of systems is negative unless indicated above    Vital Signs Last 24 Hrs  T(C): 36.7 (27 Feb 2018 05:00), Max: 36.9 (26 Feb 2018 17:26)  T(F): 98 (27 Feb 2018 05:00), Max: 98.4 (26 Feb 2018 17:26)  HR: 80 (27 Feb 2018 05:00) (76 - 140)  BP: 107/68 (27 Feb 2018 05:00) (107/68 - 136/102)  BP(mean): 102 (26 Feb 2018 23:10) (102 - 113)  RR: 18 (27 Feb 2018 05:00) (17 - 25)  SpO2: 99% (27 Feb 2018 05:00) (98% - 100%)    I&O's Summary    26 Feb 2018 07:01  -  27 Feb 2018 07:00  --------------------------------------------------------  IN: 240 mL / OUT: 0 mL / NET: 240 mL        PHYSICAL EXAM:    Constitutional: NAD, awake and alert, well-developed  HEENT: PERR, EOMI  Neck: soft and supple, No LAD, No JVD  Respiratory: Breath sounds are clear bilaterally, No wheezing, rales or rhonchi  Cardiovascular: Regular rate and rhythm, normal S1 and S2,  no murmurs, gallops or rubs  Gastrointestinal: Bowel Sounds present, soft, nontender.   Extremities: No peripheral edema. No clubbing or cyanosis.  Vascular: 2+ peripheral pulses  Neurological: A/O x 3, no focal deficits  Musculoskeletal: no calf tenderness.  Skin: No rashes.      LABS: All Labs Reviewed:                        10.5   6.8   )-----------( 313      ( 27 Feb 2018 05:47 )             32.6                         12.2   8.4   )-----------( 325      ( 26 Feb 2018 17:41 )             37.9     27 Feb 2018 05:47    139    |  105    |  14     ----------------------------<  165    4.1     |  20     |  0.99   26 Feb 2018 17:41    137    |  102    |  12     ----------------------------<  102    5.3     |  20     |  0.92     Ca    9.2        27 Feb 2018 05:47  Ca    9.9        26 Feb 2018 17:41  Phos  4.1       26 Feb 2018 17:41  Mg     1.9       26 Feb 2018 17:41    TPro  8.3    /  Alb  4.1    /  TBili  0.3    /  DBili  x      /  AST  36     /  ALT  16     /  AlkPhos  77     26 Feb 2018 17:41    PT/INR - ( 27 Feb 2018 05:47 )   PT: 22.3 sec;   INR: 2.02 ratio         PTT - ( 26 Feb 2018 17:41 )  PTT:37.4 sec    CARDIAC MARKERS ( 26 Feb 2018 17:41 )  x     / 0.02 ng/mL / 55 U/L / x     / 1.1 ng/mL    Echo:  < from: Transthoracic Echocardiogram (01.22.18 @ 15:55) >  1. An annuloplasty ring is present in the mitral position.  Mitral annular calcification and calcified mitral leaflets  with decreased diastolic opening. Mild mitral  regurgitation.  Peak mitral valve gradient equals 32 mm Hg,  mean transmitral valve gradient equals 13 mm Hg, estimated  mitral valve area equals 1.5 sqcm (by pressure half time  equation), consistent with moderate mitral stenosis.  2. Mildly dilated left atrium.  LA volume index = 39 cc/m2.  3. Normal left ventricular internal dimensions and wall  thicknesses.  4. Normal left ventricular systolic function. No segmental  wall motion abnormalities.  5. Normal right ventricular size and function.  6. Estimated right ventricular systolic pressure equals 46  mm Hg, assuming right atrial pressure equals 8 mm Hg,  consistent with mild pulmonary hypertension.  7. Normal tricuspid valve. Moderate tricuspid  regurgitation.    < end of copied text >    Cath:  < from: Cardiac Cath Lab - Adult (01.22.18 @ 09:15) >  CORONARY VESSELS: The coronary circulation is right dominant.  LM:   --  LM: Normal.  LAD:   --  LAD: Normal.  CX:   --  Circumflex: Normal.  RCA:   --  RCA: Normal.  COMPLICATIONS: There were no complications.      RADIOLOGY/EKG: Atrial fibrillation with rapid ventricular response CHIEF COMPLAINT: Rapid atrial fibrillation    HPI:  History of Present Illness:    46 yo female with PMHx of CVA (10 yrs ago with no residual), s/p right knee surgery, MR secondary to rheumatic heart disease, s/p MV commissurotomy (1991), and s/p 1/23/18 Re op MVR (T) on Coumadin who presented to [facility] via EMS from our office with new onset Afib with RVR.  Patient reports c/o palpitation with syncopal episodes 3 days ago.  In ER patient was pre medicated for IV contrast allergy for Chest CTA to evaluate for PE.  Denies CP, palpitation, SOB, PALACIOS, N/V/D, HA, fever or chills at this time.  Patient readmitted for further work up and cardiac management. (26 Feb 2018 19:45). Patient now converted to NSR.      PAST MEDICAL & SURGICAL HISTORY:  TIA (transient ischemic attack): on Aspirin  Rheumatic fever: as a teen, s/p mitral valve repair 1991  S/P MVR (mitral valve replacement): Re op MVR (T)  H/O right knee surgery  S/P mitral valve repair: 1991      Allergies    IV Contrast (Urticaria)    SOCIAL HISTORY    Smoking Hx: None  ETOH Hx: None  Marital Status:       FAMILY HISTORY:  Family history of acute myocardial infarction (Father)      MEDICATIONS:  amiodarone    Tablet 400 milliGRAM(s) Oral three times a day  aspirin enteric coated 81 milliGRAM(s) Oral daily  atorvastatin 40 milliGRAM(s) Oral at bedtime  docusate sodium 100 milliGRAM(s) Oral three times a day  metoprolol     tartrate 50 milliGRAM(s) Oral two times a day  oxyCODONE    IR 5 milliGRAM(s) Oral every 4 hours PRN  pantoprazole    Tablet 40 milliGRAM(s) Oral before breakfast  polyethylene glycol 3350 17 Gram(s) Oral at bedtime  sodium chloride 0.9% lock flush 3 milliLiter(s) IV Push every 8 hours      REVIEW OF SYSTEMS:    CONSTITUTIONAL: No weakness, fevers or chills  EYES/ENT: No visual changes;  No vertigo or throat pain   NECK: No pain or stiffness  RESPIRATORY: No cough, wheezing, hemoptysis; No shortness of breath  CARDIOVASCULAR: No chest pain or palpitations  GASTROINTESTINAL: No abdominal or epigastric pain. No nausea, vomiting, or hematemesis; No diarrhea or constipation. No melena or hematochezia.  GENITOURINARY: No dysuria, frequency or hematuria  NEUROLOGICAL: No numbness or weakness  SKIN: No itching, burning, rashes, or lesions   All other review of systems is negative unless indicated above    Vital Signs Last 24 Hrs  T(C): 36.7 (27 Feb 2018 05:00), Max: 36.9 (26 Feb 2018 17:26)  T(F): 98 (27 Feb 2018 05:00), Max: 98.4 (26 Feb 2018 17:26)  HR: 80 (27 Feb 2018 05:00) (76 - 140)  BP: 107/68 (27 Feb 2018 05:00) (107/68 - 136/102)  BP(mean): 102 (26 Feb 2018 23:10) (102 - 113)  RR: 18 (27 Feb 2018 05:00) (17 - 25)  SpO2: 99% (27 Feb 2018 05:00) (98% - 100%)    I&O's Summary    26 Feb 2018 07:01  -  27 Feb 2018 07:00  --------------------------------------------------------  IN: 240 mL / OUT: 0 mL / NET: 240 mL        PHYSICAL EXAM:    Constitutional: NAD, awake and alert, well-developed  HEENT: PERR, EOMI  Neck: soft and supple, No LAD, No JVD  Respiratory: Breath sounds are clear bilaterally, No wheezing, rales or rhonchi  Cardiovascular: Regular rate and rhythm, normal S1 and S2,  no murmurs, gallops or rubs  Gastrointestinal: Bowel Sounds present, soft, nontender.   Extremities: No peripheral edema. No clubbing or cyanosis.  Vascular: 2+ peripheral pulses  Neurological: A/O x 3, no focal deficits  Musculoskeletal: no calf tenderness.  Skin: No rashes.      LABS: All Labs Reviewed:                        10.5   6.8   )-----------( 313      ( 27 Feb 2018 05:47 )             32.6                         12.2   8.4   )-----------( 325      ( 26 Feb 2018 17:41 )             37.9     27 Feb 2018 05:47    139    |  105    |  14     ----------------------------<  165    4.1     |  20     |  0.99   26 Feb 2018 17:41    137    |  102    |  12     ----------------------------<  102    5.3     |  20     |  0.92     Ca    9.2        27 Feb 2018 05:47  Ca    9.9        26 Feb 2018 17:41  Phos  4.1       26 Feb 2018 17:41  Mg     1.9       26 Feb 2018 17:41    TPro  8.3    /  Alb  4.1    /  TBili  0.3    /  DBili  x      /  AST  36     /  ALT  16     /  AlkPhos  77     26 Feb 2018 17:41    PT/INR - ( 27 Feb 2018 05:47 )   PT: 22.3 sec;   INR: 2.02 ratio         PTT - ( 26 Feb 2018 17:41 )  PTT:37.4 sec    CARDIAC MARKERS ( 26 Feb 2018 17:41 )  x     / 0.02 ng/mL / 55 U/L / x     / 1.1 ng/mL    Echo:  < from: Transthoracic Echocardiogram (01.22.18 @ 15:55) >  1. An annuloplasty ring is present in the mitral position.  Mitral annular calcification and calcified mitral leaflets  with decreased diastolic opening. Mild mitral  regurgitation.  Peak mitral valve gradient equals 32 mm Hg,  mean transmitral valve gradient equals 13 mm Hg, estimated  mitral valve area equals 1.5 sqcm (by pressure half time  equation), consistent with moderate mitral stenosis.  2. Mildly dilated left atrium.  LA volume index = 39 cc/m2.  3. Normal left ventricular internal dimensions and wall  thicknesses.  4. Normal left ventricular systolic function. No segmental  wall motion abnormalities.  5. Normal right ventricular size and function.  6. Estimated right ventricular systolic pressure equals 46  mm Hg, assuming right atrial pressure equals 8 mm Hg,  consistent with mild pulmonary hypertension.  7. Normal tricuspid valve. Moderate tricuspid  regurgitation.    < end of copied text >    Cath:  < from: Cardiac Cath Lab - Adult (01.22.18 @ 09:15) >  CORONARY VESSELS: The coronary circulation is right dominant.  LM:   --  LM: Normal.  LAD:   --  LAD: Normal.  CX:   --  Circumflex: Normal.  RCA:   --  RCA: Normal.  COMPLICATIONS: There were no complications.      RADIOLOGY/EKG: Atrial fibrillation with rapid ventricular response  Telemetry: NSR 70's to 80's

## 2018-02-28 ENCOUNTER — TRANSCRIPTION ENCOUNTER (OUTPATIENT)
Age: 48
End: 2018-02-28

## 2018-02-28 VITALS
TEMPERATURE: 98 F | SYSTOLIC BLOOD PRESSURE: 125 MMHG | DIASTOLIC BLOOD PRESSURE: 73 MMHG | RESPIRATION RATE: 18 BRPM | OXYGEN SATURATION: 93 % | HEART RATE: 85 BPM

## 2018-02-28 LAB
ANION GAP SERPL CALC-SCNC: 10 MMOL/L — SIGNIFICANT CHANGE UP (ref 5–17)
BUN SERPL-MCNC: 19 MG/DL — SIGNIFICANT CHANGE UP (ref 7–23)
CALCIUM SERPL-MCNC: 8.9 MG/DL — SIGNIFICANT CHANGE UP (ref 8.4–10.5)
CHLORIDE SERPL-SCNC: 106 MMOL/L — SIGNIFICANT CHANGE UP (ref 96–108)
CO2 SERPL-SCNC: 24 MMOL/L — SIGNIFICANT CHANGE UP (ref 22–31)
CREAT SERPL-MCNC: 1.05 MG/DL — SIGNIFICANT CHANGE UP (ref 0.5–1.3)
GLUCOSE SERPL-MCNC: 89 MG/DL — SIGNIFICANT CHANGE UP (ref 70–99)
HCT VFR BLD CALC: 30 % — LOW (ref 34.5–45)
HGB BLD-MCNC: 9.7 G/DL — LOW (ref 11.5–15.5)
INR BLD: 2.92 RATIO — HIGH (ref 0.88–1.16)
MCHC RBC-ENTMCNC: 24.6 PG — LOW (ref 27–34)
MCHC RBC-ENTMCNC: 32.3 GM/DL — SIGNIFICANT CHANGE UP (ref 32–36)
MCV RBC AUTO: 76.2 FL — LOW (ref 80–100)
PLATELET # BLD AUTO: 268 K/UL — SIGNIFICANT CHANGE UP (ref 150–400)
POTASSIUM SERPL-MCNC: 3.9 MMOL/L — SIGNIFICANT CHANGE UP (ref 3.5–5.3)
POTASSIUM SERPL-SCNC: 3.9 MMOL/L — SIGNIFICANT CHANGE UP (ref 3.5–5.3)
PROTHROM AB SERPL-ACNC: 32.2 SEC — HIGH (ref 9.8–12.7)
RBC # BLD: 3.93 M/UL — SIGNIFICANT CHANGE UP (ref 3.8–5.2)
RBC # FLD: 15.1 % — HIGH (ref 10.3–14.5)
SODIUM SERPL-SCNC: 140 MMOL/L — SIGNIFICANT CHANGE UP (ref 135–145)
T3 SERPL-MCNC: 67 NG/DL — LOW (ref 80–200)
T4 AB SER-ACNC: 7 UG/DL — SIGNIFICANT CHANGE UP (ref 4.6–12)
TSH SERPL-MCNC: 0.72 UIU/ML — SIGNIFICANT CHANGE UP (ref 0.27–4.2)
WBC # BLD: 5.8 K/UL — SIGNIFICANT CHANGE UP (ref 3.8–10.5)
WBC # FLD AUTO: 5.8 K/UL — SIGNIFICANT CHANGE UP (ref 3.8–10.5)

## 2018-02-28 PROCEDURE — 99285 EMERGENCY DEPT VISIT HI MDM: CPT | Mod: 25

## 2018-02-28 PROCEDURE — 99239 HOSP IP/OBS DSCHRG MGMT >30: CPT | Mod: 24

## 2018-02-28 PROCEDURE — 85379 FIBRIN DEGRADATION QUANT: CPT

## 2018-02-28 PROCEDURE — 84443 ASSAY THYROID STIM HORMONE: CPT

## 2018-02-28 PROCEDURE — 82550 ASSAY OF CK (CPK): CPT

## 2018-02-28 PROCEDURE — 71275 CT ANGIOGRAPHY CHEST: CPT

## 2018-02-28 PROCEDURE — 93308 TTE F-UP OR LMTD: CPT | Mod: 26

## 2018-02-28 PROCEDURE — 85610 PROTHROMBIN TIME: CPT

## 2018-02-28 PROCEDURE — 93005 ELECTROCARDIOGRAM TRACING: CPT

## 2018-02-28 PROCEDURE — 93306 TTE W/DOPPLER COMPLETE: CPT

## 2018-02-28 PROCEDURE — 84480 ASSAY TRIIODOTHYRONINE (T3): CPT

## 2018-02-28 PROCEDURE — 93321 DOPPLER ECHO F-UP/LMTD STD: CPT | Mod: 26

## 2018-02-28 PROCEDURE — 93308 TTE F-UP OR LMTD: CPT

## 2018-02-28 PROCEDURE — 80053 COMPREHEN METABOLIC PANEL: CPT

## 2018-02-28 PROCEDURE — 80048 BASIC METABOLIC PNL TOTAL CA: CPT

## 2018-02-28 PROCEDURE — 84436 ASSAY OF TOTAL THYROXINE: CPT

## 2018-02-28 PROCEDURE — 71045 X-RAY EXAM CHEST 1 VIEW: CPT

## 2018-02-28 PROCEDURE — 85730 THROMBOPLASTIN TIME PARTIAL: CPT

## 2018-02-28 PROCEDURE — 84100 ASSAY OF PHOSPHORUS: CPT

## 2018-02-28 PROCEDURE — 82553 CREATINE MB FRACTION: CPT

## 2018-02-28 PROCEDURE — 84702 CHORIONIC GONADOTROPIN TEST: CPT

## 2018-02-28 PROCEDURE — 85027 COMPLETE CBC AUTOMATED: CPT

## 2018-02-28 PROCEDURE — 84484 ASSAY OF TROPONIN QUANT: CPT

## 2018-02-28 PROCEDURE — 70450 CT HEAD/BRAIN W/O DYE: CPT

## 2018-02-28 PROCEDURE — 93321 DOPPLER ECHO F-UP/LMTD STD: CPT

## 2018-02-28 PROCEDURE — 83735 ASSAY OF MAGNESIUM: CPT

## 2018-02-28 RX ORDER — ASPIRIN/CALCIUM CARB/MAGNESIUM 324 MG
1 TABLET ORAL
Qty: 30 | Refills: 0
Start: 2018-02-28 | End: 2018-03-29

## 2018-02-28 RX ORDER — AMIODARONE HYDROCHLORIDE 400 MG/1
1 TABLET ORAL
Qty: 30 | Refills: 0
Start: 2018-02-28 | End: 2018-03-29

## 2018-02-28 RX ORDER — METOPROLOL TARTRATE 50 MG
1 TABLET ORAL
Qty: 60 | Refills: 0
Start: 2018-02-28 | End: 2018-03-29

## 2018-02-28 RX ADMIN — PANTOPRAZOLE SODIUM 40 MILLIGRAM(S): 20 TABLET, DELAYED RELEASE ORAL at 06:06

## 2018-02-28 RX ADMIN — AMIODARONE HYDROCHLORIDE 400 MILLIGRAM(S): 400 TABLET ORAL at 06:07

## 2018-02-28 RX ADMIN — Medication 100 MILLIGRAM(S): at 06:07

## 2018-02-28 RX ADMIN — SODIUM CHLORIDE 3 MILLILITER(S): 9 INJECTION INTRAMUSCULAR; INTRAVENOUS; SUBCUTANEOUS at 06:05

## 2018-02-28 RX ADMIN — SODIUM CHLORIDE 3 MILLILITER(S): 9 INJECTION INTRAMUSCULAR; INTRAVENOUS; SUBCUTANEOUS at 12:58

## 2018-02-28 RX ADMIN — AMIODARONE HYDROCHLORIDE 400 MILLIGRAM(S): 400 TABLET ORAL at 13:21

## 2018-02-28 RX ADMIN — Medication 50 MILLIGRAM(S): at 06:06

## 2018-02-28 RX ADMIN — Medication 81 MILLIGRAM(S): at 13:22

## 2018-02-28 NOTE — DISCHARGE NOTE ADULT - REASON FOR ADMISSION
Worsening palpitations  and fainted 3 days ago. Denies chest pain. Palpitation & syncopal episode - new onset Afib s/p 1/23 MVR tissue

## 2018-02-28 NOTE — DISCHARGE NOTE ADULT - PATIENT PORTAL LINK FT
You can access the [facility] Patient Portal, offered by [facility], by registering with the following website: [email or web address]

## 2018-02-28 NOTE — DISCHARGE NOTE ADULT - ADDITIONAL INSTRUCTIONS
Follow up with your Cardiologist in 1 week for PT/INR blood draw and Coumadin dosing, please call the office [phone] to schedule an appointment.   Follow up with Dr. Vieyra at CTS office at [facility] in 3 months, call [phone] to schedule appointment.

## 2018-02-28 NOTE — DISCHARGE NOTE ADULT - MEDICATION SUMMARY - MEDICATIONS TO STOP TAKING
I will STOP taking the medications listed below when I get home from the hospital:    metoprolol succinate 50 mg oral tablet, extended release  -- 1 tab(s) by mouth once a day (at bedtime)    docusate sodium 100 mg oral capsule  -- 1 cap(s) by mouth 3 times a day    pantoprazole 40 mg oral delayed release tablet  -- 1 tab(s) by mouth once a day (before a meal)    polyethylene glycol 3350 oral powder for reconstitution  -- 17 gram(s) by mouth once a day (at bedtime)    oxyCODONE 5 mg oral tablet  -- 2 tab(s) by mouth every 6 hours, As Needed MDD:8  -- Caution federal law prohibits the transfer of this drug to any person other  than the person for whom it was prescribed.  It is very important that you take or use this exactly as directed.  Do not skip doses or discontinue unless directed by your doctor.  May cause drowsiness.  Alcohol may intensify this effect.  Use care when operating dangerous machinery.  This prescription cannot be refilled.  Using more of this medication than prescribed may cause serious breathing problems. I will STOP taking the medications listed below when I get home from the hospital:    atorvastatin 40 mg oral tablet  -- 1 tab(s) by mouth once a day (at bedtime)    metoprolol succinate 50 mg oral tablet, extended release  -- 1 tab(s) by mouth once a day (at bedtime)    docusate sodium 100 mg oral capsule  -- 1 cap(s) by mouth 3 times a day    pantoprazole 40 mg oral delayed release tablet  -- 1 tab(s) by mouth once a day (before a meal)    polyethylene glycol 3350 oral powder for reconstitution  -- 17 gram(s) by mouth once a day (at bedtime)    oxyCODONE 5 mg oral tablet  -- 2 tab(s) by mouth every 6 hours, As Needed MDD:8  -- Caution federal law prohibits the transfer of this drug to any person other  than the person for whom it was prescribed.  It is very important that you take or use this exactly as directed.  Do not skip doses or discontinue unless directed by your doctor.  May cause drowsiness.  Alcohol may intensify this effect.  Use care when operating dangerous machinery.  This prescription cannot be refilled.  Using more of this medication than prescribed may cause serious breathing problems.

## 2018-02-28 NOTE — DISCHARGE NOTE ADULT - CARE PLAN
Principal Discharge DX:	Atrial fibrillation with RVR  Goal:	Complete Recovery  Assessment and plan of treatment:	1. Daily Shower  2. Weight yourself daily and notify any weight gain greater than 2-3 pounds in 24 hours.  3. Regular diet - low fat, low cholesterol, no added salt.  4. Increase Activity as tolerated.   5. Hold Coumadin dose tonight 2/28. Start 2mg Coumadin on Thurs 3/1 and return back to regular schedule with 2mg on Tue/Thur/Fri/Sun alternating with 3mg on Mon/Wed/Sat per Dr. Santana.

## 2018-02-28 NOTE — DISCHARGE NOTE ADULT - HOME CARE AGENCY
[facility] [phone] Visiting nurse to call to arrange home care visit for start of care 1-2 days after discharge. Please call home care agency with any questions or concerns.

## 2018-02-28 NOTE — DISCHARGE NOTE ADULT - MEDICATION SUMMARY - MEDICATIONS TO CHANGE
I will SWITCH the dose or number of times a day I take the medications listed below when I get home from the hospital:    aspirin 325 mg oral delayed release tablet  -- 1 tab(s) by mouth once a day    Coumadin 5 mg oral tablet  -- 1 tab(s) by mouth once a day   -- Do not take this drug if you are pregnant.  It is very important that you take or use this exactly as directed.  Do not skip doses or discontinue unless directed by your doctor.  Obtain medical advice before taking any non-prescription drugs as some may affect the action of this medication.

## 2018-02-28 NOTE — DISCHARGE NOTE ADULT - PLAN OF CARE
Complete Recovery 1. Daily Shower  2. Weight yourself daily and notify any weight gain greater than 2-3 pounds in 24 hours.  3. Regular diet - low fat, low cholesterol, no added salt.  4. Increase Activity as tolerated.   5. Hold Coumadin dose tonight 2/28. Start 2mg Coumadin on Thurs 3/1 and return back to regular schedule with 2mg on Tue/Thur/Fri/Sun alternating with 3mg on Mon/Wed/Sat per Dr. Santana.

## 2018-02-28 NOTE — DISCHARGE NOTE ADULT - HOSPITAL COURSE
47F w/ PMHx of TIA (10 yrs ago no residual), severe MR (2/2 RHD, s/p MVR 1991, re-op MVR-T 1/23/2018, on Coumadin) who was admitted 2/26 w/ new onset AF w/ RVR. The patient was started on Amiodarone load and has subsequently converted to NSR.  AS per EP consult:  1. pAF: currently ordered for Amio load to 5g (ASt/ALT wnl), can continue load but would favor short course as outpt given age. Will need to reduce overall Coumadin dose given addition of Amio. c/w B-blocker at current dose (Lopressor 50 bid).  2/27 CT head non con - nonspecific areas of white matter decreased attenuation likely microvascular disease and small foci of age indeterminate lacunar infarction and negative intraparenchymal hematoma or midline shift.  2/28 VSS, pt discharged home per Dr. Vieyra.

## 2018-02-28 NOTE — DISCHARGE NOTE ADULT - OTHER SIGNIFICANT FINDINGS
Neurology: A&Ox3, nonfocal, no gross deficits  CV : RRR+S1S2  Sternal Wound: MSI CDI URIEL, Stable healed  Lungs: Respirations non-labored, B/L BS CTA  Abdomen: Soft, NT/ND, +BSx4Q, +BM (-)N/V/D  : Voiding without difficulty  Extremities: B/L LE no edema, negative calf tenderness, +PP        More than 30 mins spent on total encounter, patient counseling and discharge instructions.

## 2018-02-28 NOTE — DISCHARGE NOTE ADULT - CARE PROVIDER_API CALL
Janes Santana), Cardiovascular Disease; Internal Medicine  [address]  [address]  [place] [address]  Phone: [phone]  Fax: [phone]    Gage Vieyra), Surgery; Thoracic and Cardiac Surgery  [address] [place] [address]  Phone: [phone]  Fax: [phone]

## 2018-02-28 NOTE — DISCHARGE NOTE ADULT - MEDICATION SUMMARY - MEDICATIONS TO TAKE
I will START or STAY ON the medications listed below when I get home from the hospital:    acetaminophen 325 mg oral tablet  -- 2 tab(s) by mouth every 6 hours, As needed, Mild Pain (1 - 3)  -- Indication: For pain    aspirin 81 mg oral delayed release tablet  -- 1 tab(s) by mouth once a day  -- Indication: For Antiplatelet    amiodarone 200 mg oral tablet  -- 1 tab(s) by mouth once a day  -- Indication: For Antiarrythmic-afib    Coumadin 2 mg oral tablet  -- 1 tab(s) by mouth once a day  Tuesday, Thuesday, Friday, Sunday  -- Indication: For blood thinner    Coumadin 3 mg oral tablet  -- 1 tab(s) by mouth once a day  Monday, Wednesday, Saturday  -- Indication: For blood thinner    atorvastatin 40 mg oral tablet  -- 1 tab(s) by mouth once a day (at bedtime)  -- Indication: For cholesterol    metoprolol tartrate 50 mg oral tablet  -- 1 tab(s) by mouth 2 times a day  -- Indication: For heart rate I will START or STAY ON the medications listed below when I get home from the hospital:    acetaminophen 325 mg oral tablet  -- 2 tab(s) by mouth every 6 hours, As needed, Mild Pain (1 - 3)  -- Indication: For pain    aspirin 81 mg oral delayed release tablet  -- 1 tab(s) by mouth once a day  -- Indication: For Antiplatelet    amiodarone 200 mg oral tablet  -- 1 tab(s) by mouth once a day  -- Indication: For Antiarrythmic-afib    Coumadin 2 mg oral tablet  -- 1 tab(s) by mouth once a day  Tuesday, Thuesday, Friday, Sunday  -- Indication: For blood thinner    Coumadin 3 mg oral tablet  -- 1 tab(s) by mouth once a day  Monday, Wednesday, Saturday  -- Indication: For blood thinner    metoprolol tartrate 50 mg oral tablet  -- 1 tab(s) by mouth 2 times a day  -- Indication: For heart rate

## 2018-02-28 NOTE — PROGRESS NOTE ADULT - SUBJECTIVE AND OBJECTIVE BOX
SUBJECTIVE:  Feels well No Cp or SOB    MEDICATIONS:  amiodarone    Tablet 400 milliGRAM(s) Oral three times a day  metoprolol     tartrate 50 milliGRAM(s) Oral two times a day        oxyCODONE    IR 5 milliGRAM(s) Oral every 4 hours PRN    docusate sodium 100 milliGRAM(s) Oral three times a day  pantoprazole    Tablet 40 milliGRAM(s) Oral before breakfast  polyethylene glycol 3350 17 Gram(s) Oral at bedtime    atorvastatin 40 milliGRAM(s) Oral at bedtime    aspirin enteric coated 81 milliGRAM(s) Oral daily      REVIEW OF SYSTEMS:    CONSTITUTIONAL: No fever, weight loss, or fatigue  EYES: No eye pain, visual disturbances, or discharge  NECK: No pain or stiffness  RESPIRATORY: No cough, wheezing, chills or hemoptysis; No Shortness of Breath  CARDIOVASCULAR: No chest pain, palpitations, dizziness, or leg swelling  GASTROINTESTINAL: No abdominal or epigastric pain. No nausea, vomiting, or hematemesis; No diarrhea or constipation. No melena or hematochezia.  GENITOURINARY: No dysuria, frequency, hematuria, or incontinence  NEUROLOGICAL: No headaches, memory loss, loss of strength, numbness, or tremors  SKIN: No itching, burning, rashes, or lesions   LYMPH Nodes: No enlarged glands  MUSCULOSKELETAL: No joint pain or swelling; No muscle, back, or extremity pain  All other review of systems are negative.  	  [ ] Unable to obtain    PHYSICAL EXAM:  T(C): 36.6 (02-28-18 @ 05:29), Max: 36.9 (02-27-18 @ 20:39)  HR: 64 (02-28-18 @ 05:29) (64 - 73)  BP: 116/71 (02-28-18 @ 05:29) (106/68 - 129/79)  RR: 18 (02-28-18 @ 05:29) (18 - 18)  SpO2: 100% (02-28-18 @ 05:29) (100% - 100%)  Wt(kg): --  I&O's Summary    27 Feb 2018 07:01  -  28 Feb 2018 07:00  --------------------------------------------------------  IN: 880 mL / OUT: 625 mL / NET: 255 mL    28 Feb 2018 07:01  -  28 Feb 2018 10:38  --------------------------------------------------------  IN: 360 mL / OUT: 0 mL / NET: 360 mL          PHYSICAL EXAM    Appearance: Normal	  HEENT:   Normal oral mucosa, PERRL, EOMI	  NECK: Soft and supple, No LAD, No JVD  Cardiovascular: Regular Rate and Rhythm, Normal S1 S2, No murmurs, No clicks, gallops or rubs  Respiratory: Lungs clear to auscultation	  Gastrointestinal:  Soft, Non-tender, + BS	  Skin: No rashes, No ecchymoses, No cyanosis  Neurologic: Non-focal  Extremities: No clubbing, cyanosis or edema  Vascular: Peripheral pulses palpable 2+ bilaterally    TELEMETRY: 	    ECG:  	  RADIOLOGY  DIAGNOSTIC TESTING:  [ ] Echocardiogram:  [ ] Catheterization:  [ ] Stress Test:    OTHER: 	    LABS:	 	    CARDIAC MARKERS:                                  9.7    5.8   )-----------( 268      ( 28 Feb 2018 05:53 )             30.0     02-28    140  |  106  |  19  ----------------------------<  89  3.9   |  24  |  1.05    Ca    8.9      28 Feb 2018 05:53  Phos  4.1     02-26  Mg     1.9     02-26    TPro  8.3  /  Alb  4.1  /  TBili  0.3  /  DBili  x   /  AST  36  /  ALT  16  /  AlkPhos  77  02-26    proBNP:   Lipid Profile:   HgA1c:   TSH: Thyroid Stimulating Hormone, Serum: 0.72 uIU/mL (02-28 @ 07:50)

## 2018-03-21 ENCOUNTER — APPOINTMENT (OUTPATIENT)
Dept: ELECTROPHYSIOLOGY | Facility: CLINIC | Age: 48
End: 2018-03-21
Payer: COMMERCIAL

## 2018-03-21 ENCOUNTER — NON-APPOINTMENT (OUTPATIENT)
Age: 48
End: 2018-03-21

## 2018-03-21 VITALS
WEIGHT: 150 LBS | OXYGEN SATURATION: 99 % | DIASTOLIC BLOOD PRESSURE: 77 MMHG | HEART RATE: 65 BPM | BODY MASS INDEX: 23.54 KG/M2 | SYSTOLIC BLOOD PRESSURE: 113 MMHG | HEIGHT: 67 IN

## 2018-03-21 PROCEDURE — 93000 ELECTROCARDIOGRAM COMPLETE: CPT

## 2018-03-21 PROCEDURE — 99215 OFFICE O/P EST HI 40 MIN: CPT

## 2018-03-21 RX ORDER — ACETAMINOPHEN 325 MG/1
325 TABLET ORAL
Qty: 30 | Refills: 0 | Status: DISCONTINUED | COMMUNITY
End: 2018-03-21

## 2018-03-21 RX ORDER — ASPIRIN 325 MG/1
325 TABLET, COATED ORAL DAILY
Refills: 0 | Status: DISCONTINUED | COMMUNITY
End: 2018-03-21

## 2018-05-14 ENCOUNTER — OTHER (OUTPATIENT)
Age: 48
End: 2018-05-14

## 2018-05-14 ENCOUNTER — APPOINTMENT (OUTPATIENT)
Dept: CARDIOTHORACIC SURGERY | Facility: CLINIC | Age: 48
End: 2018-05-14
Payer: COMMERCIAL

## 2018-05-21 ENCOUNTER — APPOINTMENT (OUTPATIENT)
Dept: CARDIOTHORACIC SURGERY | Facility: CLINIC | Age: 48
End: 2018-05-21
Payer: COMMERCIAL

## 2018-05-21 VITALS
OXYGEN SATURATION: 100 % | RESPIRATION RATE: 15 BRPM | DIASTOLIC BLOOD PRESSURE: 81 MMHG | HEART RATE: 61 BPM | SYSTOLIC BLOOD PRESSURE: 124 MMHG | TEMPERATURE: 98.9 F | BODY MASS INDEX: 23.54 KG/M2 | HEIGHT: 67 IN | WEIGHT: 150 LBS

## 2018-05-21 PROCEDURE — 99215 OFFICE O/P EST HI 40 MIN: CPT

## 2018-05-21 RX ORDER — FLUOCINONIDE 0.5 MG/G
0.05 CREAM TOPICAL
Qty: 60 | Refills: 0 | Status: DISCONTINUED | COMMUNITY
Start: 2017-11-27 | End: 2018-05-21

## 2018-05-21 RX ORDER — AMIODARONE HYDROCHLORIDE 200 MG/1
200 TABLET ORAL DAILY
Qty: 30 | Refills: 5 | Status: DISCONTINUED | COMMUNITY
End: 2018-05-21

## 2018-05-21 RX ORDER — POLYETHYLENE GLYCOL 3350 17 G/17G
17 POWDER, FOR SOLUTION ORAL
Qty: 255 | Refills: 0 | Status: DISCONTINUED | COMMUNITY
Start: 2018-01-28 | End: 2018-05-21

## 2018-05-21 RX ORDER — METOPROLOL SUCCINATE 50 MG/1
50 TABLET, EXTENDED RELEASE ORAL DAILY
Qty: 90 | Refills: 3 | Status: DISCONTINUED | COMMUNITY
End: 2018-05-21

## 2018-05-21 RX ORDER — METOPROLOL TARTRATE 50 MG/1
50 TABLET, FILM COATED ORAL
Qty: 60 | Refills: 0 | Status: DISCONTINUED | COMMUNITY
Start: 2018-04-30 | End: 2018-05-21

## 2018-05-21 RX ORDER — PREDNISONE 50 MG/1
50 TABLET ORAL
Qty: 3 | Refills: 0 | Status: DISCONTINUED | COMMUNITY
Start: 2017-11-29

## 2018-05-21 RX ORDER — ZOLPIDEM TARTRATE 5 MG/1
5 TABLET ORAL
Qty: 30 | Refills: 0 | Status: DISCONTINUED | COMMUNITY
Start: 2018-04-02

## 2018-05-21 RX ORDER — OXYCODONE 5 MG/1
5 TABLET ORAL
Qty: 6 | Refills: 0 | Status: DISCONTINUED | COMMUNITY
End: 2018-05-21

## 2018-05-21 RX ORDER — HYDROCORTISONE 25 MG/G
2.5 CREAM TOPICAL
Qty: 30 | Refills: 0 | Status: DISCONTINUED | COMMUNITY
Start: 2018-04-18 | End: 2018-05-21

## 2018-05-25 ENCOUNTER — NON-APPOINTMENT (OUTPATIENT)
Age: 48
End: 2018-05-25

## 2018-05-25 ENCOUNTER — APPOINTMENT (OUTPATIENT)
Dept: ELECTROPHYSIOLOGY | Facility: CLINIC | Age: 48
End: 2018-05-25
Payer: COMMERCIAL

## 2018-05-25 VITALS
HEIGHT: 67 IN | DIASTOLIC BLOOD PRESSURE: 84 MMHG | HEART RATE: 64 BPM | OXYGEN SATURATION: 100 % | SYSTOLIC BLOOD PRESSURE: 141 MMHG

## 2018-05-25 PROCEDURE — 99215 OFFICE O/P EST HI 40 MIN: CPT

## 2018-05-25 PROCEDURE — 93000 ELECTROCARDIOGRAM COMPLETE: CPT

## 2018-05-25 RX ORDER — METOPROLOL SUCCINATE 25 MG/1
25 TABLET, EXTENDED RELEASE ORAL
Qty: 30 | Refills: 0 | Status: DISCONTINUED | COMMUNITY
Start: 1900-01-01 | End: 2018-05-25

## 2018-12-23 ENCOUNTER — TRANSCRIPTION ENCOUNTER (OUTPATIENT)
Age: 48
End: 2018-12-23

## 2019-01-11 NOTE — DISCHARGE NOTE ADULT - ADDITIONAL INSTRUCTIONS
Group Topic:  Education    Date:   Start Time: 10:30 AM  End Time: 11:30 AM    Focus: Thought Challenging  Number in attendance: 10    Group members placed a limiting belief on an envelope and passed it around the table for peers to add a notes statin) is the statement a fact or a judgment, 2) state why.  Each group member then reviewed worksheet \"Moving Beyond Fear and Limiting Beliefs\".  Group members requested to have their envelopes to keep.    Pt actively participated in group today.  Pt was able to make statements and felt motivated to \"make sure I put feedback in all of them\".  Pt's limiting belief was that \"I am not going to accept myself if I am not the perfect size\".    Bonifacio Mccray, MSW, LCSW     Follow up appt with Dr. Gage Vieyra in 2 weeks - call [phone] to schedule your appt.  follow up appt with Dr Alarcon, PCP in 1 week - DR. Alarcon will follow your INR - Coumadin levels - you will be on Coumadin for 3 months post surgery  refer to your Cardiac Surgery Do's & dont's Fact Sheet  shower daily  weigh yourself daily

## 2019-02-26 ENCOUNTER — RESULT REVIEW (OUTPATIENT)
Age: 49
End: 2019-02-26

## 2019-04-04 PROBLEM — G45.9 TRANSIENT CEREBRAL ISCHEMIC ATTACK, UNSPECIFIED: Chronic | Status: ACTIVE | Noted: 2018-01-22

## 2019-05-02 ENCOUNTER — APPOINTMENT (OUTPATIENT)
Dept: ULTRASOUND IMAGING | Facility: IMAGING CENTER | Age: 49
End: 2019-05-02
Payer: COMMERCIAL

## 2019-05-02 ENCOUNTER — APPOINTMENT (OUTPATIENT)
Dept: MAMMOGRAPHY | Facility: IMAGING CENTER | Age: 49
End: 2019-05-02
Payer: COMMERCIAL

## 2019-05-02 ENCOUNTER — OUTPATIENT (OUTPATIENT)
Dept: OUTPATIENT SERVICES | Facility: HOSPITAL | Age: 49
LOS: 1 days | End: 2019-05-02
Payer: COMMERCIAL

## 2019-05-02 DIAGNOSIS — Z95.2 PRESENCE OF PROSTHETIC HEART VALVE: Chronic | ICD-10-CM

## 2019-05-02 DIAGNOSIS — Z98.89 OTHER SPECIFIED POSTPROCEDURAL STATES: Chronic | ICD-10-CM

## 2019-05-02 DIAGNOSIS — Z00.8 ENCOUNTER FOR OTHER GENERAL EXAMINATION: ICD-10-CM

## 2019-05-02 DIAGNOSIS — Z98.890 OTHER SPECIFIED POSTPROCEDURAL STATES: Chronic | ICD-10-CM

## 2019-05-02 PROCEDURE — 76641 ULTRASOUND BREAST COMPLETE: CPT | Mod: 26,50

## 2019-05-02 PROCEDURE — 77063 BREAST TOMOSYNTHESIS BI: CPT

## 2019-05-02 PROCEDURE — 77067 SCR MAMMO BI INCL CAD: CPT | Mod: 26

## 2019-05-02 PROCEDURE — 77063 BREAST TOMOSYNTHESIS BI: CPT | Mod: 26

## 2019-05-02 PROCEDURE — 77067 SCR MAMMO BI INCL CAD: CPT

## 2019-05-02 PROCEDURE — 76641 ULTRASOUND BREAST COMPLETE: CPT

## 2019-05-09 ENCOUNTER — OUTPATIENT (OUTPATIENT)
Dept: OUTPATIENT SERVICES | Facility: HOSPITAL | Age: 49
LOS: 1 days | End: 2019-05-09
Payer: COMMERCIAL

## 2019-05-09 ENCOUNTER — APPOINTMENT (OUTPATIENT)
Dept: MAMMOGRAPHY | Facility: IMAGING CENTER | Age: 49
End: 2019-05-09
Payer: COMMERCIAL

## 2019-05-09 DIAGNOSIS — Z00.8 ENCOUNTER FOR OTHER GENERAL EXAMINATION: ICD-10-CM

## 2019-05-09 DIAGNOSIS — Z98.890 OTHER SPECIFIED POSTPROCEDURAL STATES: Chronic | ICD-10-CM

## 2019-05-09 DIAGNOSIS — Z98.89 OTHER SPECIFIED POSTPROCEDURAL STATES: Chronic | ICD-10-CM

## 2019-05-09 DIAGNOSIS — Z95.2 PRESENCE OF PROSTHETIC HEART VALVE: Chronic | ICD-10-CM

## 2019-05-09 PROCEDURE — G0279: CPT

## 2019-05-09 PROCEDURE — G0279: CPT | Mod: 26

## 2019-05-09 PROCEDURE — 77065 DX MAMMO INCL CAD UNI: CPT

## 2019-05-09 PROCEDURE — 77065 DX MAMMO INCL CAD UNI: CPT | Mod: 26,RT

## 2019-05-09 PROCEDURE — 77061 BREAST TOMOSYNTHESIS UNI: CPT | Mod: 26,RT

## 2019-05-21 ENCOUNTER — NON-APPOINTMENT (OUTPATIENT)
Age: 49
End: 2019-05-21

## 2019-05-21 ENCOUNTER — APPOINTMENT (OUTPATIENT)
Dept: ELECTROPHYSIOLOGY | Facility: CLINIC | Age: 49
End: 2019-05-21
Payer: COMMERCIAL

## 2019-05-21 VITALS
BODY MASS INDEX: 23.54 KG/M2 | DIASTOLIC BLOOD PRESSURE: 84 MMHG | HEIGHT: 67 IN | OXYGEN SATURATION: 100 % | WEIGHT: 150 LBS | SYSTOLIC BLOOD PRESSURE: 151 MMHG | HEART RATE: 68 BPM

## 2019-05-21 PROCEDURE — 99215 OFFICE O/P EST HI 40 MIN: CPT

## 2019-05-21 PROCEDURE — 93000 ELECTROCARDIOGRAM COMPLETE: CPT

## 2019-05-21 RX ORDER — WARFARIN SODIUM 6 MG/1
TABLET ORAL DAILY
Refills: 0 | Status: DISCONTINUED | COMMUNITY
End: 2019-05-21

## 2019-05-21 RX ORDER — WARFARIN 5 MG/1
5 TABLET ORAL
Qty: 30 | Refills: 0 | Status: DISCONTINUED | COMMUNITY
Start: 2018-01-28 | End: 2019-05-21

## 2019-05-21 NOTE — HISTORY OF PRESENT ILLNESS
[FreeTextEntry1] : Her diagnosis of stroke has been questioned secondary to follow neuro imaging.  However this is concern because of post OP AF: 1 year ago she had MV replacement.  She further explains that she has a history of RHD and in 1991 had an MV repair.  For progressive MR she underwent repeat OHS.  She was given a short course of amio and had been on metoprolol.  She explains that she does not have palpitations.  No chest pain. No shortness of breath.  She does not exercise.  She does continue the question the role of therapy with warfarin as she has biovalve.  In fact this is the main reason she deferred the mechanical MV.  She presents today for consideration of ILR.

## 2019-05-21 NOTE — DISCUSSION/SUMMARY
[FreeTextEntry1] : 48 year old woman with a history of rheumatic heart disease demonstrated paroxysmal atrial fibrillation perioperatively.  She remains on oral AC. She has a history of a TIA.  Her interest in an ILR is to hopefully come off of oral AC.  I counseled her that with her substrate she is at increased risk for recurrent AF.  Furthermore she does not feel the arrhythmia and has had a presumed thromboembolic complication. In short, no matter what the ILR shows, or doesn’t show, I would not stop her oral AC.  WIth her history or RHD this should be Coumadin.  We will defer on ILR.  F

## 2019-05-21 NOTE — PHYSICAL EXAM
[General Appearance - Well Developed] : well developed [Normal Appearance] : normal appearance [Well Groomed] : well groomed [General Appearance - Well Nourished] : well nourished [No Deformities] : no deformities [General Appearance - In No Acute Distress] : no acute distress [Normal Conjunctiva] : the conjunctiva exhibited no abnormalities [Eyelids - No Xanthelasma] : the eyelids demonstrated no xanthelasmas [Normal Oral Mucosa] : normal oral mucosa [No Oral Pallor] : no oral pallor [No Oral Cyanosis] : no oral cyanosis [Normal Jugular Venous A Waves Present] : normal jugular venous A waves present [Normal Jugular Venous V Waves Present] : normal jugular venous V waves present [No Jugular Venous Nieves A Waves] : no jugular venous nieves A waves [Respiration, Rhythm And Depth] : normal respiratory rhythm and effort [Exaggerated Use Of Accessory Muscles For Inspiration] : no accessory muscle use [Auscultation Breath Sounds / Voice Sounds] : lungs were clear to auscultation bilaterally [Heart Rate And Rhythm] : heart rate and rhythm were normal [Heart Sounds] : normal S1 and S2 [Systolic grade ___/6] : A grade [unfilled]/6 systolic murmur was heard. [Abdomen Soft] : soft [Abdomen Tenderness] : non-tender [Abdomen Mass (___ Cm)] : no abdominal mass palpated [Abnormal Walk] : normal gait [Gait - Sufficient For Exercise Testing] : the gait was sufficient for exercise testing [Nail Clubbing] : no clubbing of the fingernails [Cyanosis, Localized] : no localized cyanosis [Petechial Hemorrhages (___cm)] : no petechial hemorrhages [Skin Color & Pigmentation] : normal skin color and pigmentation [] : no rash [No Venous Stasis] : no venous stasis [Skin Lesions] : no skin lesions [No Skin Ulcers] : no skin ulcer [No Xanthoma] : no  xanthoma was observed [Oriented To Time, Place, And Person] : oriented to person, place, and time [Affect] : the affect was normal [Mood] : the mood was normal [No Anxiety] : not feeling anxious

## 2019-09-02 PROBLEM — I34.2 NON-RHEUMATIC MITRAL VALVE STENOSIS: Status: ACTIVE | Noted: 2017-02-23

## 2020-04-25 ENCOUNTER — MESSAGE (OUTPATIENT)
Age: 50
End: 2020-04-25

## 2020-05-03 ENCOUNTER — APPOINTMENT (OUTPATIENT)
Dept: DISASTER EMERGENCY | Facility: CLINIC | Age: 50
End: 2020-05-03

## 2020-05-04 LAB
SARS-COV-2 IGG SERPL IA-ACNC: <0.1 INDEX
SARS-COV-2 IGG SERPL QL IA: NEGATIVE

## 2020-09-27 ENCOUNTER — TRANSCRIPTION ENCOUNTER (OUTPATIENT)
Age: 50
End: 2020-09-27

## 2021-05-11 ENCOUNTER — OUTPATIENT (OUTPATIENT)
Dept: OUTPATIENT SERVICES | Facility: HOSPITAL | Age: 51
LOS: 1 days | End: 2021-05-11
Payer: COMMERCIAL

## 2021-05-11 ENCOUNTER — APPOINTMENT (OUTPATIENT)
Dept: ELECTROPHYSIOLOGY | Facility: CLINIC | Age: 51
End: 2021-05-11
Payer: COMMERCIAL

## 2021-05-11 ENCOUNTER — NON-APPOINTMENT (OUTPATIENT)
Age: 51
End: 2021-05-11

## 2021-05-11 VITALS
WEIGHT: 154 LBS | DIASTOLIC BLOOD PRESSURE: 84 MMHG | BODY MASS INDEX: 24.17 KG/M2 | SYSTOLIC BLOOD PRESSURE: 129 MMHG | OXYGEN SATURATION: 100 % | HEART RATE: 139 BPM | HEIGHT: 67 IN

## 2021-05-11 DIAGNOSIS — Z95.2 PRESENCE OF PROSTHETIC HEART VALVE: Chronic | ICD-10-CM

## 2021-05-11 DIAGNOSIS — Z98.89 OTHER SPECIFIED POSTPROCEDURAL STATES: Chronic | ICD-10-CM

## 2021-05-11 DIAGNOSIS — Z11.52 ENCOUNTER FOR SCREENING FOR COVID-19: ICD-10-CM

## 2021-05-11 DIAGNOSIS — Z98.890 OTHER SPECIFIED POSTPROCEDURAL STATES: Chronic | ICD-10-CM

## 2021-05-11 LAB — SARS-COV-2 RNA SPEC QL NAA+PROBE: SIGNIFICANT CHANGE UP

## 2021-05-11 PROCEDURE — 99215 OFFICE O/P EST HI 40 MIN: CPT

## 2021-05-11 PROCEDURE — 99072 ADDL SUPL MATRL&STAF TM PHE: CPT

## 2021-05-11 PROCEDURE — 93000 ELECTROCARDIOGRAM COMPLETE: CPT

## 2021-05-11 PROCEDURE — U0005: CPT

## 2021-05-11 PROCEDURE — U0003: CPT

## 2021-05-11 PROCEDURE — C9803: CPT

## 2021-05-11 RX ORDER — WARFARIN 1 MG/1
1 TABLET ORAL
Qty: 30 | Refills: 0 | Status: DISCONTINUED | COMMUNITY
Start: 2018-03-26 | End: 2021-05-11

## 2021-05-11 RX ORDER — WARFARIN 2 MG/1
2 TABLET ORAL
Qty: 60 | Refills: 0 | Status: DISCONTINUED | COMMUNITY
Start: 2018-01-30 | End: 2021-05-11

## 2021-05-11 NOTE — DISCUSSION/SUMMARY
[FreeTextEntry1] : 50 year old woman with a history of atrial arrhythmia in the setting of rheumatic heart disease (prior MVR (T)).  She is now in atrial flutter with 2:1 AV block despite high dose metoprolol.  She does appear well compensated but I do favor expediting a rhythm control strategy.  While she is referred for electrical cardioversion, I cautioned her about a high recurrence rate and we discussed possible ablation.  Regardless, with her recent subtherapeutic she will need a DONOVAN.  After much discussion we will plan of DONOVAN guided DCCV tomorrow. \par \par > 60 minutes spent discussing and coordinating care

## 2021-05-11 NOTE — CARDIOLOGY SUMMARY
[de-identified] : today:\par  Atrial flutter with 2:1. RSR(V1) -nondiagnostic. \par  -  Nonspecific T-abnormality. \par \par  [de-identified] : 2/5/2018:\par MVR (T), Mild AI, Estimated LVEF= 54%

## 2021-05-11 NOTE — REASON FOR VISIT
[Arrhythmia/ECG Abnorrmalities] : arrhythmia/ECG abnormalities [Spouse] : spouse [FreeTextEntry3] : Janes Santana

## 2021-05-11 NOTE — HISTORY OF PRESENT ILLNESS
[FreeTextEntry1] : Last week she began to note her heart racing.  She went the next day to see her PMD and was told that she was in AF with RVR.  She was given IV saline and an IV medication (she does not recall).  An echo was performed.  An INR was checked (1.3).  She saw Dr. Santana the next day.  Coumadin was stopped and she was started on Xarelto.  She followed up yesterday and was told that she was still in AF and was told to call to arrange possible cardioversion.  She feels better today.  In fact this AM she didn't’t even feel as if she was in AF.  NO lightheadedness/dizziness.  NO chest pain.  No shortness of breath today, but she did have SOB last week.

## 2021-05-11 NOTE — PHYSICAL EXAM
[Well Developed] : well developed [Well Nourished] : well nourished [No Acute Distress] : no acute distress [Normal Conjunctiva] : normal conjunctiva [Normal Venous Pressure] : normal venous pressure [No Carotid Bruit] : no carotid bruit [Normal S1, S2] : normal S1, S2 [No Rub] : no rub [No Gallop] : no gallop [Clear Lung Fields] : clear lung fields [Good Air Entry] : good air entry [No Respiratory Distress] : no respiratory distress  [Soft] : abdomen soft [Non Tender] : non-tender [No Masses/organomegaly] : no masses/organomegaly [Normal Bowel Sounds] : normal bowel sounds [Normal Gait] : normal gait [No Edema] : no edema [No Cyanosis] : no cyanosis [No Clubbing] : no clubbing [No Varicosities] : no varicosities [No Rash] : no rash [No Skin Lesions] : no skin lesions [Moves all extremities] : moves all extremities [No Focal Deficits] : no focal deficits [Normal Speech] : normal speech [Alert and Oriented] : alert and oriented [Normal memory] : normal memory [de-identified] : III/VI systolic M [de-identified] : tachycardic

## 2021-05-12 ENCOUNTER — APPOINTMENT (OUTPATIENT)
Dept: CV DIAGNOSITCS | Facility: HOSPITAL | Age: 51
End: 2021-05-12

## 2021-05-12 ENCOUNTER — OUTPATIENT (OUTPATIENT)
Dept: OUTPATIENT SERVICES | Facility: HOSPITAL | Age: 51
LOS: 1 days | End: 2021-05-12
Payer: COMMERCIAL

## 2021-05-12 VITALS — WEIGHT: 154.1 LBS | HEIGHT: 66 IN

## 2021-05-12 DIAGNOSIS — Z98.89 OTHER SPECIFIED POSTPROCEDURAL STATES: Chronic | ICD-10-CM

## 2021-05-12 DIAGNOSIS — Z95.2 PRESENCE OF PROSTHETIC HEART VALVE: Chronic | ICD-10-CM

## 2021-05-12 DIAGNOSIS — Z98.890 OTHER SPECIFIED POSTPROCEDURAL STATES: Chronic | ICD-10-CM

## 2021-05-12 DIAGNOSIS — I48.4 ATYPICAL ATRIAL FLUTTER: ICD-10-CM

## 2021-05-12 LAB
ALBUMIN SERPL ELPH-MCNC: 4.1 G/DL — SIGNIFICANT CHANGE UP (ref 3.3–5)
ALP SERPL-CCNC: 75 U/L — SIGNIFICANT CHANGE UP (ref 40–120)
ALT FLD-CCNC: 28 U/L — SIGNIFICANT CHANGE UP (ref 10–45)
ANION GAP SERPL CALC-SCNC: 16 MMOL/L — SIGNIFICANT CHANGE UP (ref 5–17)
AST SERPL-CCNC: 22 U/L — SIGNIFICANT CHANGE UP (ref 10–40)
BILIRUB SERPL-MCNC: 0.7 MG/DL — SIGNIFICANT CHANGE UP (ref 0.2–1.2)
BUN SERPL-MCNC: 14 MG/DL — SIGNIFICANT CHANGE UP (ref 7–23)
CALCIUM SERPL-MCNC: 9.8 MG/DL — SIGNIFICANT CHANGE UP (ref 8.4–10.5)
CHLORIDE SERPL-SCNC: 102 MMOL/L — SIGNIFICANT CHANGE UP (ref 96–108)
CO2 SERPL-SCNC: 21 MMOL/L — LOW (ref 22–31)
CREAT SERPL-MCNC: 1.09 MG/DL — SIGNIFICANT CHANGE UP (ref 0.5–1.3)
GLUCOSE SERPL-MCNC: 137 MG/DL — HIGH (ref 70–99)
HCG SERPL-ACNC: <2 MIU/ML — SIGNIFICANT CHANGE UP
HCT VFR BLD CALC: 46.9 % — HIGH (ref 34.5–45)
HGB BLD-MCNC: 14.5 G/DL — SIGNIFICANT CHANGE UP (ref 11.5–15.5)
MCHC RBC-ENTMCNC: 27.6 PG — SIGNIFICANT CHANGE UP (ref 27–34)
MCHC RBC-ENTMCNC: 30.9 GM/DL — LOW (ref 32–36)
MCV RBC AUTO: 89.2 FL — SIGNIFICANT CHANGE UP (ref 80–100)
NRBC # BLD: 0 /100 WBCS — SIGNIFICANT CHANGE UP (ref 0–0)
PLATELET # BLD AUTO: 237 K/UL — SIGNIFICANT CHANGE UP (ref 150–400)
POTASSIUM SERPL-MCNC: 4 MMOL/L — SIGNIFICANT CHANGE UP (ref 3.5–5.3)
POTASSIUM SERPL-SCNC: 4 MMOL/L — SIGNIFICANT CHANGE UP (ref 3.5–5.3)
PROT SERPL-MCNC: 7.4 G/DL — SIGNIFICANT CHANGE UP (ref 6–8.3)
RBC # BLD: 5.26 M/UL — HIGH (ref 3.8–5.2)
RBC # FLD: 13 % — SIGNIFICANT CHANGE UP (ref 10.3–14.5)
SODIUM SERPL-SCNC: 139 MMOL/L — SIGNIFICANT CHANGE UP (ref 135–145)
WBC # BLD: 6.89 K/UL — SIGNIFICANT CHANGE UP (ref 3.8–10.5)
WBC # FLD AUTO: 6.89 K/UL — SIGNIFICANT CHANGE UP (ref 3.8–10.5)

## 2021-05-12 PROCEDURE — 93306 TTE W/DOPPLER COMPLETE: CPT

## 2021-05-12 PROCEDURE — 92960 CARDIOVERSION ELECTRIC EXT: CPT

## 2021-05-12 PROCEDURE — 93010 ELECTROCARDIOGRAM REPORT: CPT

## 2021-05-12 PROCEDURE — 93005 ELECTROCARDIOGRAM TRACING: CPT

## 2021-05-12 PROCEDURE — 85027 COMPLETE CBC AUTOMATED: CPT

## 2021-05-12 PROCEDURE — 80053 COMPREHEN METABOLIC PANEL: CPT

## 2021-05-12 PROCEDURE — 93312 ECHO TRANSESOPHAGEAL: CPT

## 2021-05-12 PROCEDURE — 84702 CHORIONIC GONADOTROPIN TEST: CPT

## 2021-05-12 PROCEDURE — 93312 ECHO TRANSESOPHAGEAL: CPT | Mod: 26

## 2021-05-12 PROCEDURE — 93306 TTE W/DOPPLER COMPLETE: CPT | Mod: 26

## 2021-05-12 RX ORDER — WARFARIN SODIUM 2.5 MG/1
1 TABLET ORAL
Qty: 0 | Refills: 0 | DISCHARGE

## 2021-05-12 RX ORDER — RIVAROXABAN 15 MG-20MG
20 KIT ORAL ONCE
Refills: 0 | Status: DISCONTINUED | OUTPATIENT
Start: 2021-05-12 | End: 2021-05-26

## 2021-05-12 NOTE — PRE-ANESTHESIA EVALUATION ADULT - NSANTHOSAYNRD_GEN_A_CORE
No. ELY screening performed.  STOP BANG Legend: 0-2 = LOW Risk; 3-4 = INTERMEDIATE Risk; 5-8 = HIGH Risk

## 2021-06-18 ENCOUNTER — RESULT REVIEW (OUTPATIENT)
Age: 51
End: 2021-06-18

## 2022-01-27 ENCOUNTER — TRANSCRIPTION ENCOUNTER (OUTPATIENT)
Age: 52
End: 2022-01-27

## 2022-01-28 ENCOUNTER — OUTPATIENT (OUTPATIENT)
Dept: OUTPATIENT SERVICES | Facility: HOSPITAL | Age: 52
LOS: 1 days | End: 2022-01-28
Payer: COMMERCIAL

## 2022-01-28 VITALS
RESPIRATION RATE: 18 BRPM | HEART RATE: 61 BPM | DIASTOLIC BLOOD PRESSURE: 63 MMHG | OXYGEN SATURATION: 99 % | SYSTOLIC BLOOD PRESSURE: 118 MMHG

## 2022-01-28 VITALS — WEIGHT: 154.1 LBS | HEIGHT: 67 IN

## 2022-01-28 DIAGNOSIS — Z95.2 PRESENCE OF PROSTHETIC HEART VALVE: Chronic | ICD-10-CM

## 2022-01-28 DIAGNOSIS — Z98.890 OTHER SPECIFIED POSTPROCEDURAL STATES: Chronic | ICD-10-CM

## 2022-01-28 DIAGNOSIS — Z98.89 OTHER SPECIFIED POSTPROCEDURAL STATES: Chronic | ICD-10-CM

## 2022-01-28 DIAGNOSIS — I48.0 PAROXYSMAL ATRIAL FIBRILLATION: ICD-10-CM

## 2022-01-28 LAB
ANION GAP SERPL CALC-SCNC: 13 MMOL/L — SIGNIFICANT CHANGE UP (ref 5–17)
BUN SERPL-MCNC: 14 MG/DL — SIGNIFICANT CHANGE UP (ref 7–23)
CALCIUM SERPL-MCNC: 9.7 MG/DL — SIGNIFICANT CHANGE UP (ref 8.4–10.5)
CHLORIDE SERPL-SCNC: 104 MMOL/L — SIGNIFICANT CHANGE UP (ref 96–108)
CO2 SERPL-SCNC: 20 MMOL/L — LOW (ref 22–31)
CREAT SERPL-MCNC: 1.01 MG/DL — SIGNIFICANT CHANGE UP (ref 0.5–1.3)
GLUCOSE SERPL-MCNC: 98 MG/DL — SIGNIFICANT CHANGE UP (ref 70–99)
HCG SERPL-ACNC: <2 MIU/ML — SIGNIFICANT CHANGE UP
HCT VFR BLD CALC: 36.7 % — SIGNIFICANT CHANGE UP (ref 34.5–45)
HGB BLD-MCNC: 10.5 G/DL — LOW (ref 11.5–15.5)
INR BLD: 1.87 RATIO — HIGH (ref 0.88–1.16)
MCHC RBC-ENTMCNC: 20.8 PG — LOW (ref 27–34)
MCHC RBC-ENTMCNC: 28.6 GM/DL — LOW (ref 32–36)
MCV RBC AUTO: 72.7 FL — LOW (ref 80–100)
NRBC # BLD: 0 /100 WBCS — SIGNIFICANT CHANGE UP (ref 0–0)
PLATELET # BLD AUTO: 249 K/UL — SIGNIFICANT CHANGE UP (ref 150–400)
POTASSIUM SERPL-MCNC: 4.6 MMOL/L — SIGNIFICANT CHANGE UP (ref 3.5–5.3)
POTASSIUM SERPL-SCNC: 4.6 MMOL/L — SIGNIFICANT CHANGE UP (ref 3.5–5.3)
PROTHROM AB SERPL-ACNC: 21.8 SEC — HIGH (ref 10.6–13.6)
RBC # BLD: 5.05 M/UL — SIGNIFICANT CHANGE UP (ref 3.8–5.2)
RBC # FLD: 16.8 % — HIGH (ref 10.3–14.5)
SARS-COV-2 RNA SPEC QL NAA+PROBE: SIGNIFICANT CHANGE UP
SODIUM SERPL-SCNC: 137 MMOL/L — SIGNIFICANT CHANGE UP (ref 135–145)
WBC # BLD: 6.14 K/UL — SIGNIFICANT CHANGE UP (ref 3.8–10.5)
WBC # FLD AUTO: 6.14 K/UL — SIGNIFICANT CHANGE UP (ref 3.8–10.5)

## 2022-01-28 PROCEDURE — 92960 CARDIOVERSION ELECTRIC EXT: CPT

## 2022-01-28 PROCEDURE — 93010 ELECTROCARDIOGRAM REPORT: CPT

## 2022-01-28 RX ORDER — METOPROLOL TARTRATE 50 MG
1 TABLET ORAL
Qty: 0 | Refills: 0 | DISCHARGE

## 2022-01-28 NOTE — H&P CARDIOLOGY - NSICDXPASTMEDICALHX_GEN_ALL_CORE_FT
PAST MEDICAL HISTORY:  Afib     Rheumatic fever as a teen, s/p mitral valve repair 1991    TIA (transient ischemic attack) on Aspirin

## 2022-01-28 NOTE — ASU PATIENT PROFILE, ADULT - DATE OF FIRST COVID-19 BOOSTER
Date of Service: 03/22/2021    HISTORY OF PRESENT ILLNESS:  This is a 19-year-old gentleman status post ACL reconstruction 08/07/2018 with excellent result.  Last seen on 01/15/2019.  He had some exposed sutures which were removed and has been doing well, but lately has been complaining of some soreness anteromedial.  He is in the [employer] and is very active.  Not complaining of instability.  Some edema at the end of the day.  No other associated signs or symptoms.    PHYSICAL EXAMINATION:  Well-developed, well-nourished 19-year-old gentleman in no distress.  He has got a \"rock solid\" knee with a negative anterior drawer, negative Lachman.  No pivot shift.  Tenderness anteromedial joint line area.  No adenopathy behind the knee or red streaks going to medial aspect of thigh towards the groin.    RADIOGRAPHS:  Reviewed today which does not show advanced arthritis.    IMPRESSION:  Patellofemoral pain syndrome, status post anterior cruciate ligament reconstruction.    PLAN:  I do not feel any instability today with this left knee, this is patellofemoral in origin and on the sunrise view, there may be a slight tilt.  I would like to begin physical therapy.  He will follow up with us in about 2 months or sooner if there is a problem.  He seems to be pleased with that approach.  His mom was also present.      Dictated By: Zia Dyer MD  Signing Provider: Zia Dyer MD    SS/bobby ([id number])  DD: 03/22/2021 12:40:49 TD: 03/23/2021 06:34:06    Copy Sent To:    07-Dec-2021

## 2022-01-28 NOTE — H&P CARDIOLOGY - HISTORY OF PRESENT ILLNESS
52 yo female with PMHx of CVA (10 yrs ago with no residual), s/p right knee surgery, MR 2/2 rheumatic heart disease, s/p MV repair (1991), and s/p 1/23/18 Re op MVR (T) and Afib diagnosed 2018 on Xarelto s/p DCCV in the past now with return of arrhythmia who presents today for DCCV.

## 2022-01-28 NOTE — ASU PATIENT PROFILE, ADULT - FALL HARM RISK - UNIVERSAL INTERVENTIONS
Bed in lowest position, wheels locked, appropriate side rails in place/Call bell, personal items and telephone in reach/Instruct patient to call for assistance before getting out of bed or chair/Non-slip footwear when patient is out of bed/[place] to call system/Physically safe environment - no spills, clutter or unnecessary equipment/Purposeful Proactive Rounding/Room/bathroom lighting operational, light cord in reach

## 2022-01-28 NOTE — H&P CARDIOLOGY - NSICDXPASTSURGICALHX_GEN_ALL_CORE_FT
PAST SURGICAL HISTORY:  H/O right knee surgery     S/P mitral valve repair 1991    S/P MVR (mitral valve replacement) Re op MVR (T)

## 2022-03-02 ENCOUNTER — INPATIENT (INPATIENT)
Facility: HOSPITAL | Age: 52
LOS: 1 days | Discharge: HOME | DRG: 310 | End: 2022-03-04
Attending: INTERNAL MEDICINE | Admitting: INTERNAL MEDICINE
Payer: COMMERCIAL

## 2022-03-02 ENCOUNTER — NON-APPOINTMENT (OUTPATIENT)
Age: 52
End: 2022-03-02

## 2022-03-02 VITALS
HEART RATE: 104 BPM | DIASTOLIC BLOOD PRESSURE: 80 MMHG | SYSTOLIC BLOOD PRESSURE: 107 MMHG | WEIGHT: 154.1 LBS | RESPIRATION RATE: 20 BRPM | TEMPERATURE: 98 F | OXYGEN SATURATION: 99 % | HEIGHT: 67 IN

## 2022-03-02 DIAGNOSIS — I48.91 UNSPECIFIED ATRIAL FIBRILLATION: ICD-10-CM

## 2022-03-02 DIAGNOSIS — Z98.89 OTHER SPECIFIED POSTPROCEDURAL STATES: Chronic | ICD-10-CM

## 2022-03-02 DIAGNOSIS — Z95.2 PRESENCE OF PROSTHETIC HEART VALVE: Chronic | ICD-10-CM

## 2022-03-02 DIAGNOSIS — Z98.890 OTHER SPECIFIED POSTPROCEDURAL STATES: Chronic | ICD-10-CM

## 2022-03-02 LAB
ALBUMIN SERPL ELPH-MCNC: 4.3 G/DL — SIGNIFICANT CHANGE UP (ref 3.3–5)
ALP SERPL-CCNC: 95 U/L — SIGNIFICANT CHANGE UP (ref 40–120)
ALT FLD-CCNC: 15 U/L — SIGNIFICANT CHANGE UP (ref 10–45)
ANION GAP SERPL CALC-SCNC: 13 MMOL/L — SIGNIFICANT CHANGE UP (ref 5–17)
ANISOCYTOSIS BLD QL: SLIGHT — SIGNIFICANT CHANGE UP
APTT BLD: 38.1 SEC — HIGH (ref 27.5–35.5)
AST SERPL-CCNC: 20 U/L — SIGNIFICANT CHANGE UP (ref 10–40)
BASE EXCESS BLDV CALC-SCNC: 1.5 MMOL/L — SIGNIFICANT CHANGE UP (ref -2–2)
BASOPHILS # BLD AUTO: 0.11 K/UL — SIGNIFICANT CHANGE UP (ref 0–0.2)
BASOPHILS NFR BLD AUTO: 1.7 % — SIGNIFICANT CHANGE UP (ref 0–2)
BILIRUB SERPL-MCNC: 0.7 MG/DL — SIGNIFICANT CHANGE UP (ref 0.2–1.2)
BUN SERPL-MCNC: 12 MG/DL — SIGNIFICANT CHANGE UP (ref 7–23)
CA-I SERPL-SCNC: 1.29 MMOL/L — SIGNIFICANT CHANGE UP (ref 1.15–1.33)
CALCIUM SERPL-MCNC: 10.2 MG/DL — SIGNIFICANT CHANGE UP (ref 8.4–10.5)
CHLORIDE BLDV-SCNC: 104 MMOL/L — SIGNIFICANT CHANGE UP (ref 96–108)
CHLORIDE SERPL-SCNC: 103 MMOL/L — SIGNIFICANT CHANGE UP (ref 96–108)
CO2 BLDV-SCNC: 28 MMOL/L — HIGH (ref 22–26)
CO2 SERPL-SCNC: 23 MMOL/L — SIGNIFICANT CHANGE UP (ref 22–31)
CREAT SERPL-MCNC: 1.1 MG/DL — SIGNIFICANT CHANGE UP (ref 0.5–1.3)
EGFR: 60 ML/MIN/1.73M2 — SIGNIFICANT CHANGE UP
ELLIPTOCYTES BLD QL SMEAR: SLIGHT — SIGNIFICANT CHANGE UP
EOSINOPHIL # BLD AUTO: 0.33 K/UL — SIGNIFICANT CHANGE UP (ref 0–0.5)
EOSINOPHIL NFR BLD AUTO: 5.2 % — SIGNIFICANT CHANGE UP (ref 0–6)
GAS PNL BLDV: 135 MMOL/L — LOW (ref 136–145)
GAS PNL BLDV: SIGNIFICANT CHANGE UP
GAS PNL BLDV: SIGNIFICANT CHANGE UP
GIANT PLATELETS BLD QL SMEAR: PRESENT — SIGNIFICANT CHANGE UP
GLUCOSE BLDV-MCNC: 115 MG/DL — HIGH (ref 70–99)
GLUCOSE SERPL-MCNC: 119 MG/DL — HIGH (ref 70–99)
HCO3 BLDV-SCNC: 27 MMOL/L — SIGNIFICANT CHANGE UP (ref 22–29)
HCT VFR BLD CALC: 42.3 % — SIGNIFICANT CHANGE UP (ref 34.5–45)
HCT VFR BLDA CALC: 37 % — SIGNIFICANT CHANGE UP (ref 34.5–46.5)
HGB BLD CALC-MCNC: 12.4 G/DL — SIGNIFICANT CHANGE UP (ref 11.7–16.1)
HGB BLD-MCNC: 12.1 G/DL — SIGNIFICANT CHANGE UP (ref 11.5–15.5)
INR BLD: 1.65 RATIO — HIGH (ref 0.88–1.16)
LACTATE BLDV-MCNC: 2.1 MMOL/L — HIGH (ref 0.7–2)
LIDOCAIN IGE QN: 37 U/L — SIGNIFICANT CHANGE UP (ref 7–60)
LYMPHOCYTES # BLD AUTO: 1.38 K/UL — SIGNIFICANT CHANGE UP (ref 1–3.3)
LYMPHOCYTES # BLD AUTO: 21.8 % — SIGNIFICANT CHANGE UP (ref 13–44)
MAGNESIUM SERPL-MCNC: 2.1 MG/DL — SIGNIFICANT CHANGE UP (ref 1.6–2.6)
MANUAL SMEAR VERIFICATION: SIGNIFICANT CHANGE UP
MCHC RBC-ENTMCNC: 20.1 PG — LOW (ref 27–34)
MCHC RBC-ENTMCNC: 28.6 GM/DL — LOW (ref 32–36)
MCV RBC AUTO: 70.3 FL — LOW (ref 80–100)
MICROCYTES BLD QL: SLIGHT — SIGNIFICANT CHANGE UP
MONOCYTES # BLD AUTO: 0.49 K/UL — SIGNIFICANT CHANGE UP (ref 0–0.9)
MONOCYTES NFR BLD AUTO: 7.8 % — SIGNIFICANT CHANGE UP (ref 2–14)
NEUTROPHILS # BLD AUTO: 3.86 K/UL — SIGNIFICANT CHANGE UP (ref 1.8–7.4)
NEUTROPHILS NFR BLD AUTO: 60.9 % — SIGNIFICANT CHANGE UP (ref 43–77)
NT-PROBNP SERPL-SCNC: 1416 PG/ML — HIGH (ref 0–300)
PCO2 BLDV: 43 MMHG — HIGH (ref 39–42)
PH BLDV: 7.4 — SIGNIFICANT CHANGE UP (ref 7.32–7.43)
PLAT MORPH BLD: NORMAL — SIGNIFICANT CHANGE UP
PLATELET # BLD AUTO: 285 K/UL — SIGNIFICANT CHANGE UP (ref 150–400)
PO2 BLDV: 21 MMHG — LOW (ref 25–45)
POIKILOCYTOSIS BLD QL AUTO: SLIGHT — SIGNIFICANT CHANGE UP
POTASSIUM BLDV-SCNC: 4.7 MMOL/L — SIGNIFICANT CHANGE UP (ref 3.5–5.1)
POTASSIUM SERPL-MCNC: 4.7 MMOL/L — SIGNIFICANT CHANGE UP (ref 3.5–5.3)
POTASSIUM SERPL-SCNC: 4.7 MMOL/L — SIGNIFICANT CHANGE UP (ref 3.5–5.3)
PROT SERPL-MCNC: 7.9 G/DL — SIGNIFICANT CHANGE UP (ref 6–8.3)
PROTHROM AB SERPL-ACNC: 19.1 SEC — HIGH (ref 10.5–13.4)
RBC # BLD: 6.02 M/UL — HIGH (ref 3.8–5.2)
RBC # FLD: 18.3 % — HIGH (ref 10.3–14.5)
RBC BLD AUTO: ABNORMAL
SAO2 % BLDV: 29.5 % — LOW (ref 67–88)
SODIUM SERPL-SCNC: 139 MMOL/L — SIGNIFICANT CHANGE UP (ref 135–145)
TROPONIN T, HIGH SENSITIVITY RESULT: 7 NG/L — SIGNIFICANT CHANGE UP (ref 0–51)
VARIANT LYMPHS # BLD: 2.6 % — SIGNIFICANT CHANGE UP (ref 0–6)
WBC # BLD: 6.34 K/UL — SIGNIFICANT CHANGE UP (ref 3.8–10.5)
WBC # FLD AUTO: 6.34 K/UL — SIGNIFICANT CHANGE UP (ref 3.8–10.5)

## 2022-03-02 PROCEDURE — 93010 ELECTROCARDIOGRAM REPORT: CPT

## 2022-03-02 PROCEDURE — 99285 EMERGENCY DEPT VISIT HI MDM: CPT | Mod: 25

## 2022-03-02 PROCEDURE — 71045 X-RAY EXAM CHEST 1 VIEW: CPT | Mod: 26

## 2022-03-02 RX ORDER — RIVAROXABAN 15 MG-20MG
20 KIT ORAL
Refills: 0 | Status: DISCONTINUED | OUTPATIENT
Start: 2022-03-02 | End: 2022-03-04

## 2022-03-02 RX ORDER — SOTALOL HCL 120 MG
80 TABLET ORAL ONCE
Refills: 0 | Status: COMPLETED | OUTPATIENT
Start: 2022-03-02 | End: 2022-03-02

## 2022-03-02 RX ADMIN — Medication 80 MILLIGRAM(S): at 19:31

## 2022-03-02 NOTE — ED PROVIDER NOTE - OBJECTIVE STATEMENT
Attending Fidelia Santana: 50 yo female h/o afib in the past, MVR with prior ablation with dr reeves presenting with chest pain, palpitations, and not feeling well that began yesterday. pt states similar symptoms when in atria lfibrillation. no black or bloody stools. spoke with stephanie and instructed to come to the ed. reports worsening dyspnea with exertiona, chest discomfort and not feeling well. no back pain. has been compliant with her metoprolol. is on xarelto. does endorse feeling lightheaded with standing

## 2022-03-02 NOTE — CONSULT NOTE ADULT - SUBJECTIVE AND OBJECTIVE BOX
CHIEF COMPLAINT: AF with RVR     HISTORY OF PRESENT ILLNESS:  50 yo female with PMHx of CVA (10y ago with no residual), s/p right knee surgery, MR 2/2 rheumatic heart disease, s/p MV repair (1991), and s/p redosternotomy MVR (T) in 1/23/18, AF diagnosed 2/2018 on Xarelto, s/p multiple DCCV most recently on 1/28/22. QTc on post DCCV EKG 1/28/22 was 413ms with sinus rate @ 64bpm. She states since her DCCV on 1/28 that she has been taking her Xarelto every day without missing doses, she also takes Metoprolol.  She presents to the ED today c/o lightheadedness and PALACIOS x1 day, intermittent CP. She states she has been in bed all day because when she ambulates she feels like she is going to pass out, denies syncope, CP, orthopnea, LE edema. Telemetry reveals AF with 's. She is otherwise hemodynamically stable. EP consulted for management of AF with RVR.     Allergies    IV Contrast (Urticaria)    Intolerances    	    MEDICATIONS:  Home Medications:  metoprolol succinate: 75 milligram(s) orally once a day (28 Jan 2022 13:32)  Xarelto 20 mg oral tablet: 1 tab(s) orally once a day (12 May 2021 08:11)        PAST MEDICAL & SURGICAL HISTORY:  Rheumatic fever  as a teen, s/p mitral valve repair 1991    TIA (transient ischemic attack)  on Aspirin    Afib    S/P mitral valve repair  1991    H/O right knee surgery    S/P MVR (mitral valve replacement)  Re op MVR (T)        FAMILY HISTORY:  Family history of acute myocardial infarction (Father)        SOCIAL HISTORY:    Denies smoking, EtOH, illicit drug use       REVIEW OF SYSTEMS:  See HPI. Otherwise, 10 point ROS done and otherwise negative.    PHYSICAL EXAM:  T(C): 36.8 (03-02-22 @ 14:38), Max: 36.8 (03-02-22 @ 14:38)  HR: 104 (03-02-22 @ 14:38) (104 - 104)  BP: 107/80 (03-02-22 @ 14:38) (107/80 - 107/80)  RR: 20 (03-02-22 @ 14:38) (20 - 20)  SpO2: 99% (03-02-22 @ 14:38) (99% - 99%)  Wt(kg): --  I&O's Summary      Appearance: Alert. NAD	  HEENT:   NC/AT	  Cardiovascular: +S1S2 irregular no m/g/r  Respiratory: CTA B/L	  Gastrointestinal:  Soft, NT.ND., + BS	  Extremities: No edema BLE  Vascular: Peripheral pulses palpable 2+ bilaterally, warm, well perfused       LABS:	 	    CBC Full  -  ( 02 Mar 2022 15:01 )  WBC Count : 6.34 K/uL  Hemoglobin : 12.1 g/dL  Hematocrit : 42.3 %  Platelet Count - Automated : 285 K/uL  Mean Cell Volume : 70.3 fl  Mean Cell Hemoglobin : 20.1 pg  Mean Cell Hemoglobin Concentration : 28.6 gm/dL  Auto Neutrophil # : 3.86 K/uL  Auto Lymphocyte # : 1.38 K/uL  Auto Monocyte # : 0.49 K/uL  Auto Eosinophil # : 0.33 K/uL  Auto Basophil # : 0.11 K/uL  Auto Neutrophil % : 60.9 %  Auto Lymphocyte % : 21.8 %  Auto Monocyte % : 7.8 %  Auto Eosinophil % : 5.2 %  Auto Basophil % : 1.7 %    03-02    139  |  103  |  12  ----------------------------<  119<H>  4.7   |  23  |  1.10    Ca    10.2      02 Mar 2022 15:01  Mg     2.1     03-02    TPro  7.9  /  Alb  4.3  /  TBili  0.7  /  DBili  x   /  AST  20  /  ALT  15  /  AlkPhos  95  03-02      proBNP: Serum Pro-Brain Natriuretic Peptide: 1416 pg/mL (03-02 @ 15:01)    TELEMETRY:  AF with RVR 	    ECG:  	 bpm, QRS narrow 72ms     Echocardiogram:  < from: Transesophageal Echocardiogram (05.12.21 @ 10:25) >  Dimensions:    Normal Values:  LA:     4.2    2.0 - 4.0 cm  Ao:     3.2    2.0 - 3.8 cm  SEPTUM: 1.0    0.6 - 1.2 cm  PWT:    0.8    0.6 - 1.1 cm  LVIDd:  3.8    3.0 - 5.6 cm  LVIDs:  2.9    1.8 - 4.0 cm  Derived variables:  LVMI: 56 g/m2  RWT: 0.42  Fractional short: 24 %  EF (Visual Estimate): 50 %  ------------------------------------------------------------------------  Observations:  Mitral Valve: Bioprosthetic mitral valve replacement.  Normal leaflet motion. Mean transmitral valve gradient  equals5 mm Hg, which is probably normal in the setting of  a bioprosthetic mitral valve replacement.  Aortic Valve/Aorta: Normal trileaflet aortic valve.  Aortic Root: 3.2 cm.  Left Atrium: Normal left atrium.  LA volume index = 33  cc/m2.   Spontaneous echo contrast seen.   No left atrial  or left atrial appendage thrombus.   Decreased left atrial  appendage velocities noted.  Left Ventricle: Endocardium not well visualized; grossly  normal left ventricular systolic function. Septal motion  consistentwith cardiac surgery. Normal left ventricular  internal dimensions and wall thicknesses.  Right Heart: Rght atrial enlargement. Right ventricular  enlargement with normal right ventricular systolic  function. Normal tricuspid valve. Mild tricuspid  regurgitation. Normal pulmonic valve.  Pericardium/Pleura: Normal pericardium with no pericardial  effusion.  Hemodynamic: Estimated right atrial pressure is 8 mm Hg.  Estimated right ventricular systolic pressure equals 31 mm  Hg, assuming right atrial pressure equals 8 mm Hg,  consistent with normal pulmonary pressures. Agitated saline  injection and color flow Doppler demonstrates no evidence  of a patent foramen ovale.  ------------------------------------------------------------------------  Conclusions:  1. Bioprosthetic mitral valve replacement.  Normal leaflet  motion.  2. Normal left atrium.  LA volume index = 33 cc/m2.  Spontaneous echo contrast seen.   No left atrial or left  atrial appendage thrombus.   Decreased left atrial  appendage velocities noted.  3. Normal left ventricular internal dimensions and wall  thicknesses.  4. Endocardium not well visualized; grossly normal left  ventricular systolic function. Septal motion consistent  with cardiac surgery.  Performed in the setting of atrial flutter with rapid  ventricular response.    < end of copied text >

## 2022-03-02 NOTE — H&P ADULT - NSHPLABSRESULTS_GEN_ALL_CORE
12.1   6.34  )-----------( 285      ( 02 Mar 2022 15:01 )             42.3       03-02    139  |  103  |  12  ----------------------------<  119<H>  4.7   |  23  |  1.10    Ca    10.2      02 Mar 2022 15:01  Mg     2.1     03-02    TPro  7.9  /  Alb  4.3  /  TBili  0.7  /  DBili  x   /  AST  20  /  ALT  15  /  AlkPhos  95  03-02                  PT/INR - ( 02 Mar 2022 15:01 )   PT: 19.1 sec;   INR: 1.65 ratio         PTT - ( 02 Mar 2022 15:01 )  PTT:38.1 sec    Lactate Trend            CAPILLARY BLOOD GLUCOSE

## 2022-03-02 NOTE — ED ADULT NURSE REASSESSMENT NOTE - NS ED NURSE REASSESS COMMENT FT1
pt resting in bed, pt on cardiac monitor. vs documented. pt covid swabbed, pt denies chest pain/ discomfort at this time.

## 2022-03-02 NOTE — ED ADULT NURSE NOTE - OBJECTIVE STATEMENT
PT is a 51 year old female with PMHx of mitral valve replacement, afib on xarelto, and cardioversion x 2.  PT presents to ED with c/o SOB and palpitations.  Pt is alert and oriented x 3.  Pt denies chest pain.  On cardiac monitor, rapid afib noted.  Pt respirations even and unlabored.  Skin warm dry and intact. Labs drawn and sent.

## 2022-03-02 NOTE — ED PROVIDER NOTE - PROGRESS NOTE DETAILS
Attending Fidelia Santana: fran consulted Attending Fidelia Santana: d/w EP plan to change medications and request admission

## 2022-03-02 NOTE — ED ADULT NURSE NOTE - ED STAT RN HANDOFF DETAILS
rn will made aware pt will need ekg at 2130, as per pt she took Xarelto this am, holding rn will states he will speak to np covering pt to verify if pt needs medication.

## 2022-03-02 NOTE — ED PROVIDER NOTE - CLINICAL SUMMARY MEDICAL DECISION MAKING FREE TEXT BOX
52 yo F pw palpitations and SOB consistent with afib w rvr. Patient states this is similar to prior episode fo AFib w RvR. Will obtain blood work to check electrolytes and consult EP given prior cardioversions, last being 3 weeks ago. Patient alert and oriented, blood pressure stable. 52 yo F pw palpitations and SOB consistent with afib w rvr. Patient states this is similar to prior episode fo AFib w RvR. Will obtain blood work to check electrolytes and consult EP given prior cardioversions, last being 3 weeks ago. Patient alert and oriented, blood pressure stable.  Attending Fidelia Santana: 52 yo female h/o afib, recent cardioversion and treatment with dr reeves presenting with episode of palpitation and chest discomfort similar to whenshe had afib I nthe past. ekg upon arrival shows evidence of afib. rates between . ep called upon arrival. pt denies any fevers. less likely infection as trigger. is on anticoagulation and compliant making PE less likely. will send labs, extended electrolytes, monitor, d/w EP.

## 2022-03-02 NOTE — H&P ADULT - HISTORY OF PRESENT ILLNESS
50 yo female with PMHx of CVA (10y ago with no residual), s/p right knee surgery, MR 2/2 rheumatic heart disease, s/p MV repair (1991), and s/p redosternotomy MVR (T) in 1/23/18, AF diagnosed 2/2018 on Xarelto, s/p multiple DCCV most recently on 1/28/22.   She states since her DCCV on 1/28 that she has been taking her Xarelto every day without missing doses, she also takes Metoprolol.    She presents to the ED today c/o lightheadedness and PALACIOS x1 day, intermittent CP.   She states she has been in bed all day because when she ambulates she feels like she is going to pass out  , denies syncope, CP, orthopnea, LE edema.   Telemetry reveals AF with 's

## 2022-03-02 NOTE — ED PROVIDER NOTE - PHYSICAL EXAMINATION
Attending Fidelia Santana: Gen: NAD, heent: atrauamtic, eomi, perrla, mmm, op pink, uvula midline, neck; nttp, no nuchal rigidity, chest: nttp, no crepitus, cv: tachycardic. irregular lungs: ctab, abd: soft, nontender, nondistended, no peritoneal signs,, no guarding, ext: wwp, neg homans, skin: no rash, neuro: awake and alert, following commands, speech clear, sensation and strength intact, no focal deficits

## 2022-03-02 NOTE — CONSULT NOTE ADULT - ASSESSMENT
52 yo female with PMHx of CVA (10y ago with no residual), s/p right knee surgery, MR 2/2 rheumatic heart disease, s/p MV repair (1991), and s/p redosternotomy MVR (T) in 1/23/18, AF diagnosed 2/2018 on Xarelto, s/p multiple DCCV most recently on 1/28/22 presents today c/o lightheadedness and PALACIOS x1 day, intermittent CP. Telemetry reveals AF with 's. She is otherwise hemodynamically stable. EP consulted for management of AF with RVR.     1) AF with RVR   2) Rheumatic dz s/p re-do MVR-T     As per Dr. Luevano, give one time dose of Sotalol 80mg tonight (QTc in NSR 1/28/22: 413ms)   Make NPO s/p midnight w/ plan for DONOVAN DCCV 3/3 in AM if patient does not convert, and will continue Sotalol afterward. If patient converts to NSR overnight, will order Sotalol based on QTc in sinus rhythm   Continue Xarelto   Discontinue Toprol   Obtain TSH, COVID PCR    Above plan discussed with Dr. Luevano, ED team and with patient  52 yo female with PMHx of CVA (10y ago with no residual), s/p right knee surgery, MR 2/2 rheumatic heart disease, s/p MV repair (1991), and s/p redosternotomy MVR (T) in 1/23/18, AF diagnosed 2/2018 on Xarelto, s/p multiple DCCV most recently on 1/28/22 presents today c/o lightheadedness and PALACIOS x1 day, intermittent CP. Telemetry reveals AF with 's. She is otherwise hemodynamically stable. EP consulted for management of AF with RVR.     1) AF with RVR   2) Rheumatic dz s/p re-do MVR-T     As per Dr. Luevano, give one time dose of Sotalol 80mg tonight (QTc in NSR 1/28/22: 413ms)   Make NPO s/p midnight w/ plan for DONOVAN DCCV 3/3 in AM if patient does not convert, and will continue Sotalol afterward. If patient converts to NSR overnight, will order Sotalol based on QTc in sinus rhythm   Check EKG 2 hours s/p Sotalol administration to assess for derangements in QTc. Do not administer next dose of Sotalol if QTc > 470ms   Ensure K >4, Mg >2   Continue telemetry monitoring   Continue Xarelto   Discontinue Toprol   Obtain TSH, COVID PCR    Above plan discussed with Dr. Luevano, ED team and with patient    [id number]   50 yo female with PMHx of CVA (10y ago with no residual), s/p right knee surgery, MR 2/2 rheumatic heart disease, s/p MV repair (1991), and s/p redosternotomy MVR (T) in 1/23/18, AF diagnosed 2/2018 on Xarelto, s/p multiple DCCV most recently on 1/28/22 presents today c/o lightheadedness and PALACIOS x1 day, intermittent CP. Telemetry reveals AF with 's. She is otherwise hemodynamically stable. EP consulted for management of AF with RVR.     1) AF with RVR   2) Rheumatic dz s/p re-do MVR-T     As per Dr. Luevano, give one time dose of Sotalol 80mg tonight (QTc in NSR 1/28/22: 413ms)   Make NPO s/p midnight w/ plan for DCCV 3/3 in AM if patient does not convert, and will continue Sotalol afterward. If patient converts to NSR overnight, will order Sotalol based on QTc in sinus rhythm   Check EKG 2 hours s/p Sotalol administration to assess for derangements in QTc. Do not administer next dose of Sotalol if QTc > 470ms   Ensure K >4, Mg >2   Continue telemetry monitoring   Continue Xarelto   Discontinue Toprol   Obtain TSH, COVID PCR    Above plan discussed with Dr. Luevano, ED team and with patient    [id number]

## 2022-03-02 NOTE — ED PROVIDER NOTE - ATTENDING CONTRIBUTION TO CARE
Attending MD Fidelia Santana:  I personally have seen and examined this patient.  Resident note reviewed and agree on plan of care and except where noted.  See HPI, PE, and MDM for details.

## 2022-03-03 ENCOUNTER — TRANSCRIPTION ENCOUNTER (OUTPATIENT)
Age: 52
End: 2022-03-03

## 2022-03-03 LAB
ANION GAP SERPL CALC-SCNC: 11 MMOL/L — SIGNIFICANT CHANGE UP (ref 5–17)
BUN SERPL-MCNC: 15 MG/DL — SIGNIFICANT CHANGE UP (ref 7–23)
CALCIUM SERPL-MCNC: 10 MG/DL — SIGNIFICANT CHANGE UP (ref 8.4–10.5)
CHLORIDE SERPL-SCNC: 105 MMOL/L — SIGNIFICANT CHANGE UP (ref 96–108)
CO2 SERPL-SCNC: 23 MMOL/L — SIGNIFICANT CHANGE UP (ref 22–31)
CREAT SERPL-MCNC: 1.03 MG/DL — SIGNIFICANT CHANGE UP (ref 0.5–1.3)
EGFR: 65 ML/MIN/1.73M2 — SIGNIFICANT CHANGE UP
GLUCOSE SERPL-MCNC: 107 MG/DL — HIGH (ref 70–99)
HCT VFR BLD CALC: 38.9 % — SIGNIFICANT CHANGE UP (ref 34.5–45)
HGB BLD-MCNC: 11.2 G/DL — LOW (ref 11.5–15.5)
LACTATE SERPL-SCNC: 1.1 MMOL/L — SIGNIFICANT CHANGE UP (ref 0.7–2)
MAGNESIUM SERPL-MCNC: 2 MG/DL — SIGNIFICANT CHANGE UP (ref 1.6–2.6)
MCHC RBC-ENTMCNC: 20.1 PG — LOW (ref 27–34)
MCHC RBC-ENTMCNC: 28.8 GM/DL — LOW (ref 32–36)
MCV RBC AUTO: 70 FL — LOW (ref 80–100)
NRBC # BLD: 0 /100 WBCS — SIGNIFICANT CHANGE UP (ref 0–0)
PLATELET # BLD AUTO: 261 K/UL — SIGNIFICANT CHANGE UP (ref 150–400)
POTASSIUM SERPL-MCNC: 4.5 MMOL/L — SIGNIFICANT CHANGE UP (ref 3.5–5.3)
POTASSIUM SERPL-SCNC: 4.5 MMOL/L — SIGNIFICANT CHANGE UP (ref 3.5–5.3)
RBC # BLD: 5.56 M/UL — HIGH (ref 3.8–5.2)
RBC # FLD: 17.2 % — HIGH (ref 10.3–14.5)
SARS-COV-2 RNA SPEC QL NAA+PROBE: SIGNIFICANT CHANGE UP
SODIUM SERPL-SCNC: 139 MMOL/L — SIGNIFICANT CHANGE UP (ref 135–145)
WBC # BLD: 5.71 K/UL — SIGNIFICANT CHANGE UP (ref 3.8–10.5)
WBC # FLD AUTO: 5.71 K/UL — SIGNIFICANT CHANGE UP (ref 3.8–10.5)

## 2022-03-03 PROCEDURE — 93005 ELECTROCARDIOGRAM TRACING: CPT

## 2022-03-03 PROCEDURE — U0003: CPT

## 2022-03-03 PROCEDURE — 80048 BASIC METABOLIC PNL TOTAL CA: CPT

## 2022-03-03 PROCEDURE — 93010 ELECTROCARDIOGRAM REPORT: CPT | Mod: 76

## 2022-03-03 PROCEDURE — 84702 CHORIONIC GONADOTROPIN TEST: CPT

## 2022-03-03 PROCEDURE — 85027 COMPLETE CBC AUTOMATED: CPT

## 2022-03-03 PROCEDURE — 92960 CARDIOVERSION ELECTRIC EXT: CPT

## 2022-03-03 PROCEDURE — 93010 ELECTROCARDIOGRAM REPORT: CPT

## 2022-03-03 PROCEDURE — 85610 PROTHROMBIN TIME: CPT

## 2022-03-03 RX ORDER — SOTALOL HCL 120 MG
80 TABLET ORAL EVERY 12 HOURS
Refills: 0 | Status: DISCONTINUED | OUTPATIENT
Start: 2022-03-03 | End: 2022-03-03

## 2022-03-03 RX ORDER — DIPHENHYDRAMINE HCL 50 MG
25 CAPSULE ORAL ONCE
Refills: 0 | Status: COMPLETED | OUTPATIENT
Start: 2022-03-03 | End: 2022-03-03

## 2022-03-03 RX ORDER — SOTALOL HCL 120 MG
80 TABLET ORAL EVERY 12 HOURS
Refills: 0 | Status: DISCONTINUED | OUTPATIENT
Start: 2022-03-03 | End: 2022-03-04

## 2022-03-03 RX ADMIN — RIVAROXABAN 20 MILLIGRAM(S): KIT at 07:08

## 2022-03-03 RX ADMIN — Medication 25 MILLIGRAM(S): at 21:21

## 2022-03-03 RX ADMIN — Medication 80 MILLIGRAM(S): at 21:20

## 2022-03-03 RX ADMIN — Medication 80 MILLIGRAM(S): at 11:04

## 2022-03-03 NOTE — DISCHARGE NOTE PROVIDER - NSDCCPCAREPLAN_GEN_ALL_CORE_FT
PRINCIPAL DISCHARGE DIAGNOSIS  Diagnosis: Atrial fibrillation with RVR  Assessment and Plan of Treatment: Continue with your cardiologist and primary care MD. Continue your current medications. Call your physician for palpitations, feelings of rapid heart beat, lightheadedness, or dizziness. Continue Xarelto as prescribed.   F/u with Cardiologist in 1-2 weeks.

## 2022-03-03 NOTE — PROGRESS NOTE ADULT - ASSESSMENT
50 yo female with PMHx of CVA (10y ago with no residual), s/p right knee surgery, MR 2/2 rheumatic heart disease, s/p MV repair (1991), and s/p redosternotomy MVR (T) in 1/23/18, AF diagnosed 2/2018 on Xarelto, s/p multiple DCCV most recently on 1/28/22.  w/ palps and found to be in rapid A FIB  S/P CARDIOVERSION  SOTALOL as per EP TEAM  TSH  TELE  HOLD B BLOCKERS  C/W XARELTO

## 2022-03-03 NOTE — PROGRESS NOTE ADULT - ASSESSMENT
50 yo female with PMHx of CVA (10y ago with no residual), s/p right knee surgery, MR 2/2 rheumatic heart disease, s/p MV repair (1991), and s/p redosternotomy MVR (T) in 1/23/18, AF diagnosed 2/2018 on Xarelto, s/p multiple DCCV most recently on 1/28/22 presents today c/o lightheadedness and PALACIOS x1 day, intermittent CP. Telemetry reveals AF with 's. She is otherwise hemodynamically stable. EP consulted for management of AF with RVR.     1) AF with RVR   2) Rheumatic dz s/p re-do MVR-T     - Given one time dose of Sotalol 80mg last night (QTc in NSR 1/28/22: 413ms)   - Keep patient  NPO for DCCV early today 3/3 as patient remains in AF overnight  - Will continue Sotalol 80mg bid post CV, recheck ECG/QTc interval in sinus rhythm  - Ensure K >4, Mg >2  Continue telemetry monitoring   - Continue Xarelto 20mg qhs    #[id number] 50 yo female with PMHx of CVA (10y ago with no residual), s/p right knee surgery, MR 2/2 rheumatic heart disease, s/p MV repair (1991), and s/p redosternotomy MVR (T) in 1/23/18, AF diagnosed 2/2018 on Xarelto, s/p multiple DCCV most recently on 1/28/22 presents today c/o lightheadedness and PALACIOS x1 day, intermittent CP. Telemetry reveals AF with 's. She is otherwise hemodynamically stable. EP consulted for management of AF with RVR.     1) AF with RVR   2) Rheumatic dz s/p re-do MVR-T     - Given one time dose of Sotalol 80mg last night (QTc in NSR 1/28/22: 413ms)   - Keep patient  NPO for DCCV early today 3/3 as patient remains in AF overnight  - Will continue Sotalol 80mg bid post CV, recheck ECG/QTc interval in sinus rhythm  - Ensure K >4, Mg >2  Continue telemetry monitoring   - Continue Xarelto 20mg qhs    #[phone]    Addendum @ 9:32am:    s/p DC CV to NSR 60-70s this morning 3/3/22  Post CV ECG in NSR, QT 414ms/QTc 447ms  Continue Sotalol 80mg bid, check ECG 2 hrs after dose of Sotalol and prior to evening dose   Hold Sotalol if QTc >500ms or >15% of baseline  Continue to monitor    #[phone]

## 2022-03-03 NOTE — DISCHARGE NOTE PROVIDER - NSDCFUADDINST_GEN_ALL_CORE_FT
Avoid heavy caffeinated beverage or too much alcohol which may trigger fast heart rate. Take medication as prescribed and follow up with Dr. Luevano office for arrhythmia issues.

## 2022-03-03 NOTE — DISCHARGE NOTE PROVIDER - HOSPITAL COURSE
50 yo female with PMHx of CVA (10y ago with no residual), s/p right knee surgery, MR 2/2 rheumatic heart disease, s/p MV repair (1991), and s/p redosternotomy MVR (T) in 1/23/18, AF diagnosed 2/2018 on Xarelto, s/p multiple DCCV most recently on 1/28/22.  She states since her DCCV on 1/28 that she has been taking her Xarelto every day without missing doses, she also takes Metoprolol. She presents to the ED today c/o lightheadedness and PALACIOS x1 day, intermittent CP. She states she has been in bed all day because when she ambulates she feels like she is going to pass out. Pt denies syncope, CP, orthopnea, LE edema.   In ED Telemetry reveals AF with 's, Sotalol 80mg x 1 dose given then admitted to Tele. Metoprolol was discontinued.   on 3/3 s/p DCCV with 200 Jules, converted to SR. Sotalol 80mg Q 12 hours started, Continue to monitor over night. 52 yo female with PMHx of CVA (10y ago with no residual), s/p right knee surgery, MR 2/2 rheumatic heart disease, s/p MV repair (1991), and s/p redosternotomy MVR (T) in 1/23/18, AF diagnosed 2/2018 on Xarelto, s/p multiple DCCV most recently on 1/28/22.  She states since her DCCV on 1/28 that she has been taking her Xarelto every day without missing doses, she also takes Metoprolol. She presents to the ED today c/o lightheadedness and PALACIOS x1 day, intermittent CP. She states she has been in bed all day because when she ambulates she feels like she is going to pass out. Pt denies syncope, CP, orthopnea, LE edema.   In ED Telemetry reveals AF with 's, Sotalol 80mg x 1 dose given then admitted to Tele. Metoprolol was discontinued.   on 3/3 s/p DCCV with 200 Jules, converted to SR. Sotalol 80mg Q 12 hours started, plan to discharge home after 5th dose of Sotalol if QTC remains acceptable range. 52 yo female with PMHx of CVA (10y ago with no residual), s/p right knee surgery, MR 2/2 rheumatic heart disease, s/p MV repair (1991), and s/p redosternotomy MVR (T) in 1/23/18, AF diagnosed 2/2018 on Xarelto, s/p multiple DCCV most recently on 1/28/22.  She states since her DCCV on 1/28 that she has been taking her Xarelto every day without missing doses, she also takes Metoprolol. She presents to the ED today c/o lightheadedness and PALACIOS x1 day, intermittent CP. She states she has been in bed all day because when she ambulates she feels like she is going to pass out. Pt denies syncope, CP, orthopnea, LE edema.   In ED Telemetry reveals AF with 's, Sotalol 80mg x 1 dose given then admitted to Tele. Metoprolol was discontinued.   on 3/3 s/p DCCV with 200 Jules, converted to SR. Sotalol 80mg Q 12 hours started, plan to discharge home after 5th dose of Sotalol if QTC remains acceptable range.   Maryan to discharge home after 5th dose of Sotalol tonight follow up with Dr. Luevano on 4/12 5:30 pm.

## 2022-03-03 NOTE — DISCHARGE NOTE PROVIDER - NSDCMRMEDTOKEN_GEN_ALL_CORE_FT
metoprolol succinate: 75 milligram(s) orally once a day  NOTE: pt takes 25mg ER &amp; 50mg ER tabs at home  multivitamin:   Vitamin B12:   Xarelto 20 mg oral tablet: 1 tab(s) orally once a day   acetaminophen 325 mg oral tablet: 2 tab(s) orally every 6 hours, As needed, Temp greater or equal to 38C (100.4F), Moderate Pain (4 - 6)  multivitamin:   sotalol 80 mg oral tablet: 1 tab(s) orally every 12 hours  Vitamin B12:   Xarelto 20 mg oral tablet: 1 tab(s) orally once a day

## 2022-03-03 NOTE — DISCHARGE NOTE PROVIDER - NSDCFUSCHEDAPPT_GEN_ALL_CORE_FT
SHARON MCKNIGHT ; 03/08/2022 ; [facility]  SHARON MCKNIGHT ; 04/12/2022 ; [facility]

## 2022-03-03 NOTE — ASU PATIENT PROFILE, ADULT - FALL HARM RISK - UNIVERSAL INTERVENTIONS
Bed in lowest position, wheels locked, appropriate side rails in place/Call bell, personal items and telephone in reach/Instruct patient to call for assistance before getting out of bed or chair/Non-slip footwear when patient is out of bed/[place] to call system/Physically safe environment - no spills, clutter or unnecessary equipment/Purposeful Proactive Rounding/Room/bathroom lighting operational, light cord in reach

## 2022-03-03 NOTE — DISCHARGE NOTE PROVIDER - CARE PROVIDER_API CALL
Jesus Luevano)  Cardiac Electrophysiology; Cardiology  [address] [place] [address]  Phone: [phone]  Fax: [phone]  Scheduled Appointment: 04/12/2022 05:30 PM

## 2022-03-04 ENCOUNTER — TRANSCRIPTION ENCOUNTER (OUTPATIENT)
Age: 52
End: 2022-03-04

## 2022-03-04 VITALS
SYSTOLIC BLOOD PRESSURE: 111 MMHG | TEMPERATURE: 98 F | HEART RATE: 54 BPM | OXYGEN SATURATION: 99 % | DIASTOLIC BLOOD PRESSURE: 78 MMHG | RESPIRATION RATE: 18 BRPM

## 2022-03-04 DIAGNOSIS — I48.91 UNSPECIFIED ATRIAL FIBRILLATION: ICD-10-CM

## 2022-03-04 PROCEDURE — 92960 CARDIOVERSION ELECTRIC EXT: CPT

## 2022-03-04 PROCEDURE — 84132 ASSAY OF SERUM POTASSIUM: CPT

## 2022-03-04 PROCEDURE — 85018 HEMOGLOBIN: CPT

## 2022-03-04 PROCEDURE — 82803 BLOOD GASES ANY COMBINATION: CPT

## 2022-03-04 PROCEDURE — 71045 X-RAY EXAM CHEST 1 VIEW: CPT

## 2022-03-04 PROCEDURE — 80053 COMPREHEN METABOLIC PANEL: CPT

## 2022-03-04 PROCEDURE — 82330 ASSAY OF CALCIUM: CPT

## 2022-03-04 PROCEDURE — 80048 BASIC METABOLIC PNL TOTAL CA: CPT

## 2022-03-04 PROCEDURE — 85014 HEMATOCRIT: CPT

## 2022-03-04 PROCEDURE — 85730 THROMBOPLASTIN TIME PARTIAL: CPT

## 2022-03-04 PROCEDURE — 83735 ASSAY OF MAGNESIUM: CPT

## 2022-03-04 PROCEDURE — 85027 COMPLETE CBC AUTOMATED: CPT

## 2022-03-04 PROCEDURE — 84484 ASSAY OF TROPONIN QUANT: CPT

## 2022-03-04 PROCEDURE — 83690 ASSAY OF LIPASE: CPT

## 2022-03-04 PROCEDURE — U0005: CPT

## 2022-03-04 PROCEDURE — 93010 ELECTROCARDIOGRAM REPORT: CPT

## 2022-03-04 PROCEDURE — 84295 ASSAY OF SERUM SODIUM: CPT

## 2022-03-04 PROCEDURE — 99285 EMERGENCY DEPT VISIT HI MDM: CPT | Mod: 25

## 2022-03-04 PROCEDURE — 83605 ASSAY OF LACTIC ACID: CPT

## 2022-03-04 PROCEDURE — 82435 ASSAY OF BLOOD CHLORIDE: CPT

## 2022-03-04 PROCEDURE — U0003: CPT

## 2022-03-04 PROCEDURE — 85025 COMPLETE CBC W/AUTO DIFF WBC: CPT

## 2022-03-04 PROCEDURE — 83880 ASSAY OF NATRIURETIC PEPTIDE: CPT

## 2022-03-04 PROCEDURE — 85610 PROTHROMBIN TIME: CPT

## 2022-03-04 PROCEDURE — 82947 ASSAY GLUCOSE BLOOD QUANT: CPT

## 2022-03-04 PROCEDURE — 93005 ELECTROCARDIOGRAM TRACING: CPT

## 2022-03-04 RX ORDER — ACETAMINOPHEN 500 MG
650 TABLET ORAL EVERY 6 HOURS
Refills: 0 | Status: DISCONTINUED | OUTPATIENT
Start: 2022-03-04 | End: 2022-03-04

## 2022-03-04 RX ORDER — SOTALOL HCL 120 MG
1 TABLET ORAL
Qty: 60 | Refills: 1
Start: 2022-03-04 | End: 2022-05-02

## 2022-03-04 RX ORDER — ACETAMINOPHEN 500 MG
2 TABLET ORAL
Qty: 0 | Refills: 0 | DISCHARGE
Start: 2022-03-04

## 2022-03-04 RX ORDER — METOPROLOL TARTRATE 50 MG
75 TABLET ORAL
Qty: 0 | Refills: 0 | DISCHARGE

## 2022-03-04 RX ADMIN — Medication 80 MILLIGRAM(S): at 09:00

## 2022-03-04 RX ADMIN — Medication 650 MILLIGRAM(S): at 10:27

## 2022-03-04 RX ADMIN — RIVAROXABAN 20 MILLIGRAM(S): KIT at 17:12

## 2022-03-04 RX ADMIN — Medication 80 MILLIGRAM(S): at 19:00

## 2022-03-04 RX ADMIN — Medication 650 MILLIGRAM(S): at 10:57

## 2022-03-04 NOTE — PROGRESS NOTE ADULT - PROBLEM SELECTOR PLAN 1
- s/p DCCV on 3/3.   - Sotalol 80mg Q 12 hours then 2Hr post EKG.   - Evening QTC s/p Sotalol 451  - Poss D/C after EKG post 3/4am dose   - c/w Xarelto

## 2022-03-04 NOTE — PROGRESS NOTE ADULT - ASSESSMENT
50 yo female with PMHx of CVA (10y ago with no residual), s/p right knee surgery, MR 2/2 rheumatic heart disease, s/p MV repair (1991), and s/p redosternotomy MVR (T) in 1/23/18, AF diagnosed 2/2018 on Xarelto, s/p multiple DCCV most recently on 1/28/22 presents today c/o lightheadedness and PALACIOS x1 day, intermittent CP. Telemetry reveals AF with 's. She is otherwise hemodynamically stable. EP consulted for management of AF with RVR.     1) AF with RVR   2) Rheumatic dz s/p re-do MVR-T     - Pt is s/p DCCV to NSR. Tele with ~60-70s this AM.    - QTc s/p 3/3 PM dose: ~454ms. (3/3 s/p DCCV QT 414ms/QTc 447ms) Continue Sotalol 80mg BID. Please get EKG after AM dose today (4th dose). Hold Sotalol if QTc >500ms or >15% of baseline  - If QTc WNL, ok to go home s/p PM dose tonight.   - Ensure K >4, Mg >2  Continue telemetry monitoring   - Continue Xarelto 20mg qhs. Importance of AC compliance reviewed with patient.   - Discussed with EP attending and primary team.    #[phone]   50 yo female with PMHx of CVA (10y ago with no residual), s/p right knee surgery, MR 2/2 rheumatic heart disease, s/p MV repair (1991), and s/p redosternotomy MVR (T) in 1/23/18, AF diagnosed 2/2018 on Xarelto, s/p multiple DCCV most recently on 1/28/22 presents today c/o lightheadedness and PALACIOS x1 day, intermittent CP. Telemetry reveals AF with 's. She is otherwise hemodynamically stable. EP consulted for management of AF with RVR.     1) AF with RVR   2) Rheumatic dz s/p re-do MVR-T     - Pt is s/p DCCV to NSR. Tele with ~60-70s this AM.    - QTc s/p 3/3 PM dose: ~454ms. (3/3 s/p DCCV QT 414ms/QTc 447ms) Continue Sotalol 80mg BID. Please get EKG after AM dose today (4th dose). Hold Sotalol if QTc >500ms or >15% of baseline. (QTc in NSR 1/28/22: 413ms)   - If QTc WNL, ok to go home s/p PM dose tonight.   - Ensure K >4, Mg >2  Continue telemetry monitoring   - Continue Xarelto 20mg qhs. Importance of AC compliance reviewed with patient.   - Discussed with EP attending and primary team.    #[phone]   52 yo female with PMHx of CVA (10y ago with no residual), s/p right knee surgery, MR 2/2 rheumatic heart disease, s/p MV repair (1991), and s/p redosternotomy MVR (T) in 1/23/18, AF diagnosed 2/2018 on Xarelto, s/p multiple DCCV most recently on 1/28/22 presents today c/o lightheadedness and PALACIOS x1 day, intermittent CP. Telemetry reveals AF with 's. She is otherwise hemodynamically stable. EP consulted for management of AF with RVR.     1) AF with RVR   2) Rheumatic dz s/p re-do MVR-T     - Pt is s/p DCCV to NSR. Tele with ~60-70s this AM.    - QTc s/p 3/3 PM dose: ~454ms. (3/3 s/p DCCV QT 414ms/QTc 447ms) Continue Sotalol 80mg BID. Please get EKG after AM dose today (4th dose). Hold Sotalol if QTc >500ms or >15% of baseline. (QTc in NSR 1/28/22: 413ms)   - If QTc WNL, ok to go home s/p PM dose tonight.   - Ensure K >4, Mg >2  Continue telemetry monitoring   - Continue Xarelto 20mg qhs. Importance of AC compliance reviewed with patient.   - Discussed with EP attending and primary team.    S/P 4th Sotalol dose. EKG QTc ~461ms.   Give PM dose at 7pm and get EKG 2 hours post. If QTc WNL, no increase >15% of baseline or >500ms, pt is cleared by EP to go home.   #[phone]   50 yo female with PMHx of CVA (10y ago with no residual), s/p right knee surgery, MR 2/2 rheumatic heart disease, s/p MV repair (1991), and s/p redosternotomy MVR (T) in 1/23/18, AF diagnosed 2/2018 on Xarelto, s/p multiple DCCV most recently on 1/28/22 presents today c/o lightheadedness and PALACIOS x1 day, intermittent CP. Telemetry reveals AF with 's. She is otherwise hemodynamically stable. EP consulted for management of AF with RVR.     1) AF with RVR   2) Rheumatic dz s/p re-do MVR-T     - Pt is s/p DCCV to NSR. Tele with ~60-70s this AM.    - QTc s/p 3/3 PM dose: ~454ms. (3/3 s/p DCCV QT 414ms/QTc 447ms) Continue Sotalol 80mg BID. Please get EKG after AM dose today (4th dose). Hold Sotalol if QTc >500ms or >15% of baseline. (QTc in NSR 1/28/22: 413ms)   - If QTc WNL, ok to go home s/p PM dose tonight.   - Ensure K >4, Mg >2  Continue telemetry monitoring   - Continue Xarelto 20mg qhs. Importance of AC compliance reviewed with patient.   - Discussed with EP attending and primary team.    S/P 4th Sotalol dose. EKG QTc ~461ms.   Give PM dose at 7pm and get EKG 2 hours post. If QTc WNL, no increase >15% of baseline or >500ms, pt is cleared by EP to go home.   F/U appt with Dr. Luevano scheduled for 4/12/22 at 530pm  #[phone]

## 2022-03-04 NOTE — PROGRESS NOTE ADULT - ASSESSMENT
52 yo female with PMHx of CVA (10y ago with no residual), s/p right knee surgery, MR 2/2 rheumatic heart disease, s/p MV repair (1991), and s/p redosternotomy MVR (T) in 1/23/18, AF diagnosed 2/2018 on Xarelto, s/p multiple DCCV most recently on 1/28/22.  w/ palps and found to be in rapid A FIB  S/P CARDIOVERSION  SOTALOL as per EP TEAM  C/W XARELTO  d/c when ok w/ ep

## 2022-03-04 NOTE — DISCHARGE NOTE NURSING/CASE MANAGEMENT/SOCIAL WORK - PATIENT PORTAL LINK FT
You can access the FollowMyHealth Patient Portal offered by [facility] by registering at the following website: [email or web address]. By joining [facility]’s FollowMyHealth portal, you will also be able to view your health information using other applications (apps) compatible with our system.

## 2022-03-04 NOTE — DISCHARGE NOTE NURSING/CASE MANAGEMENT/SOCIAL WORK - NSDCPEFALRISK_GEN_ALL_CORE
For information on Fall & Injury Prevention, visit: [email or web address] OR  [email or web address] OR  https://www.cdc.gov/steadi/patient.html

## 2022-03-04 NOTE — PROGRESS NOTE ADULT - SUBJECTIVE AND OBJECTIVE BOX
24H hour events: Tele with sinus rhythm ~60-70s. Pt feeling well post DCCV. Denies chest pain, SOB, or palpitations.     MEDICATIONS:  rivaroxaban 20 milliGRAM(s) Oral with dinner  sotalol 80 milliGRAM(s) Oral every 12 hours    REVIEW OF SYSTEMS:  Complete 10point ROS negative.    PHYSICAL EXAM:  T(C): 36.4 (03-04-22 @ 08:41), Max: 36.6 (03-04-22 @ 04:59)  HR: 63 (03-04-22 @ 08:41) (57 - 68)  BP: 113/82 (03-04-22 @ 08:41) (95/61 - 122/63)  RR: 17 (03-04-22 @ 08:41) (16 - 20)  SpO2: 98% (03-04-22 @ 08:41) (98% - 100%)  Wt(kg): --  I&O's Summary    03 Mar 2022 07:01  -  04 Mar 2022 07:00  --------------------------------------------------------  IN: 240 mL / OUT: 0 mL / NET: 240 mL    04 Mar 2022 07:01  -  04 Mar 2022 09:42  --------------------------------------------------------  IN: 240 mL / OUT: 0 mL / NET: 240 mL        Appearance: Normal	  Cardiovascular: Normal S1 S2, No JVD, No murmurs  Respiratory: Lungs clear to auscultation	  Psychiatry: A & O x 3, Mood & affect appropriate  Neurologic: Non-focal  Extremities: No BLE edema        LABS:	 	    CBC Full  -  ( 03 Mar 2022 05:31 )  WBC Count : 5.71 K/uL  Hemoglobin : 11.2 g/dL  Hematocrit : 38.9 %  Platelet Count - Automated : 261 K/uL  Mean Cell Volume : 70.0 fl  Mean Cell Hemoglobin : 20.1 pg  Mean Cell Hemoglobin Concentration : 28.8 gm/dL      03-03    139  |  105  |  15  ----------------------------<  107<H>  4.5   |  23  |  1.03  03-02    139  |  103  |  12  ----------------------------<  119<H>  4.7   |  23  |  1.10    Ca    10.0      03 Mar 2022 05:31  Ca    10.2      02 Mar 2022 15:01  Mg     2.0     03-03  Mg     2.1     03-02    TPro  7.9  /  Alb  4.3  /  TBili  0.7  /  DBili  x   /  AST  20  /  ALT  15  /  AlkPhos  95  03-02      proBNP: Serum Pro-Brain Natriuretic Peptide: 1416 pg/mL (03-02 @ 15:01)    TSH:  Thyroid Stimulating Hormone, Serum (02.28.18 @ 07:50)    Thyroid Stimulating Hormone, Serum: 0.72 uIU/mL        CARDIAC MARKERS: Troponin T, High Sensitivity (03.02.22 @ 15:01)    Troponin T, High Sensitivity Result: 7: Specimen not hemolyzed  *  *  Rapid upward or downward changes in high-sensitivity troponin levels  suggest acute myocardial injury. Renal impairment may cause sustained  troponin elevations.  Normal: <6 - 14 ng/L  Indeterminate: 15-51 ng/L  Elevated: > 51 ng/L  See http://labs/test/[email or web address] on the [facility] intranet for more  information ng/L      TELEMETRY: 	  SR ~60-70s  	  RADIOLOGY: < from: Xray Chest 1 View AP/PA (03.02.22 @ 15:46) >    Status post sternotomy. Mitral valve annuloplasty.  Heart size is normal.  Clear lungs.  The visualized osseous structures demonstrate no acute pathology.      IMPRESSION:  Clear lungs.    < end of copied text >      PREVIOUS DIAGNOSTIC TESTING:    [ ] Echocardiogram: < from: Limited Transthoracic Echo (02.28.18 @ 10:23) >  Observations:  Mitral Valve: Bioprosthetic mitral valve replacement.  Left Ventricle: Endocardium not well visualized; grossly  normal left ventricular systolic function.  Right Heart: The right ventricle is not well visualized;  grossly normal right ventricular systolic function.  Pericardium/Pleura: Normal pericardium with no pericardial  effusion.  Right pleural effusion.  ------------------------------------------------------------------------  Conclusions:  Limited TTE to evaluate the pericardium.  1. Normal pericardium with no pericardial effusion.  2. Right pleural effusion.    < end of copied text >    < from: Cardiac Cath Lab - Adult (01.22.18 @ 09:15) >  CORONARY VESSELS: The coronary circulation is right dominant.  LM:   --  LM: Normal.  LAD:   --  LAD: Normal.  CX:   --  Circumflex: Normal.  RCA:   --  RCA: Normal.  COMPLICATIONS: There were no complications.  DIAGNOSTIC RECOMMENDATIONS: Consultation with kota will be obtained for  mitral valve replacement.  Prepared and signed by  Andriy Santa M.D.    < end of copied text >  	  < from: Transthoracic Echocardiogram (02.26.18 @ 20:19) >  Dimensions:    Normal Values:  LA:     4.2    2.0 - 4.0 cm  Ao:     3.3    2.0 - 3.8 cm  SEPTUM: 1.2    0.6 - 1.2 cm  PWT:  0.9    0.6 - 1.1 cm  LVIDd:  3.3    3.0 - 5.6 cm  LVIDs:  2.1    1.8 - 4.0 cm  Derived variables:  LVMI: 59 g/m2  RWT: 0.54  Fractional short: 36 %  EF (Teicholtz): 67 %  Doppler Peak Velocity (m/sec): MV=1.9 AoV=0.9  ------------------------------------------------------------------------  Observations:  Mitral Valve: Bioprosthetic mitral valve replacement. Peak  mitral valve gradient equals 15 mm Hg, mean transmitral  valve gradient equals 6 mm Hg, which is elevated even in  the setting of a bioprosthetic mitral valve replacement.  Aortic Valve/Aorta: Normal trileaflet aortic valve. Peak  transaortic valve gradient equals 4 mm Hg. Peak left  ventricular outflow tract gradient equals 1 mm Hg.  Aortic Root: 3.3 cm.  Left Atrium: Normal leftatrium.  Left Ventricle: Left ventricular ejection fraction, by  visual estimation, 55-60%.  Endocardium not well  visualized.  Normal left ventricular internal dimensions  and wall thicknesses.  Right Heart: Right atrium not well visualized. The right  ventricle is not well visualized. Normal tricuspid valve.  Normal pulmonic valve.  Pericardium/Pleura: Normal pericardium with no pericardial  effusion.  Hemodynamic: Estimated right atrial pressure is 8 mm Hg.  Estimated right ventricular systolic pressure equals 21 mm  Hg, assuming right atrial pressure equals 8 mm Hg,  consistent with normal pulmonary pressures.  ------------------------------------------------------------------------  Conclusions:  1. Normal left atrium.  2. Left ventricular ejection fraction, by visual  estimation, is 50-55%. Endocardium not well visualized.  3. Right atrium not well visualized.  4. The right ventricle is not well visualized.  5. Normally functioning bioprosthetic mitral valve  replacement.  6. Normalpericardium with no pericardial effusion.    < end of copied text >    
24H hour events:   Comfortable in bed; ambulates to bathroom without difficulty; tele overnight remains in AFib 110-140s, occ PVC    MEDICATIONS:  rivaroxaban 20 milliGRAM(s) Oral with dinner  Sotalol 80mg    REVIEW OF SYSTEMS:  Constitutional: no fever or chills  Neuro: no fever or chills  Respiratory: no sob or cough  Cardiac: (+) palpitations, no chest pain  GI: no n/v, or abdominal pain  : no n/v, or abdominal pain  Ext: no numbness or tingling    PHYSICAL EXAM:  T(C): 36.7 (03-03-22 @ 04:28), Max: 37.1 (03-02-22 @ 19:15)  HR: 109 (03-03-22 @ 04:28) (104 - 127)  BP: 121/89 (03-03-22 @ 04:28) (101/68 - 136/65)  RR: 18 (03-03-22 @ 04:28) (16 - 20)  SpO2: 100% (03-03-22 @ 04:28) (99% - 100%)  Wt(kg): --  I&O's Summary    03 Mar 2022 07:01  -  03 Mar 2022 08:39  --------------------------------------------------------  IN: 0 mL / OUT: 0 mL / NET: 0 mL        Appearance: Normal, in NAD, ambulates to bathroom  HEENT: Normocephalic, atraumatic, normal oral mucosa  Cardiovascular: irregular S1 S2, No JVD, No m/r/g  Respiratory: Lungs clear to auscultation	  Psychiatry: A & O x 3, Mood & affect appropriate  Gastrointestinal:  Soft, Non-tender, + BS	  : voids to bathroom  Skin: No rashes, No ecchymoses, No cyanosis	  Neurologic: Non-focal  Extremities: Normal range of motion, No c/c/e  Vascular: Peripheral pulses palpable 2+ bilaterally        LABS:	 	    CBC Full  -  ( 03 Mar 2022 05:31 )  WBC Count : 5.71 K/uL  Hemoglobin : 11.2 g/dL  Hematocrit : 38.9 %  Platelet Count - Automated : 261 K/uL  Mean Cell Volume : 70.0 fl  Mean Cell Hemoglobin : 20.1 pg  Mean Cell Hemoglobin Concentration : 28.8 gm/dL  Auto Neutrophil # : x  Auto Lymphocyte # : x  Auto Monocyte # : x  Auto Eosinophil # : x  Auto Basophil # : x  Auto Neutrophil % : x  Auto Lymphocyte % : x  Auto Monocyte % : x  Auto Eosinophil % : x  Auto Basophil % : x    03-03    139  |  105  |  15  ----------------------------<  107<H>  4.5   |  23  |  1.03  03-02    139  |  103  |  12  ----------------------------<  119<H>  4.7   |  23  |  1.10    Ca    10.0      03 Mar 2022 05:31  Ca    10.2      02 Mar 2022 15:01  Mg     2.0     03-03  Mg     2.1     03-02    TPro  7.9  /  Alb  4.3  /  TBili  0.7  /  DBili  x   /  AST  20  /  ALT  15  /  AlkPhos  95  03-02      proBNP: Serum Pro-Brain Natriuretic Peptide: 1416 pg/mL (03-02 @ 15:01)    Lipid Profile:   HgA1c:   TSH:       CARDIAC MARKERS:      TELEMETRY: Atrial Fibrillation 110-140s, PVC  	    DONOVAN 5/12/21:    Dimensions:    Normal Values:  LA:     4.2    2.0 - 4.0 cm  Ao:     3.2    2.0 - 3.8 cm  SEPTUM: 1.0    0.6 - 1.2 cm  PWT:    0.8    0.6 - 1.1 cm  LVIDd:  3.8    3.0 - 5.6 cm  LVIDs:  2.9    1.8 - 4.0 cm  Derived variables:  LVMI: 56 g/m2  RWT: 0.42  Fractional short: 24 %  EF (Visual Estimate): 50 %  ------------------------------------------------------------------------  Observations:  Mitral Valve: Bioprosthetic mitral valve replacement.  Normal leaflet motion. Mean transmitral valve gradient  equals5 mm Hg, which is probably normal in the setting of  a bioprosthetic mitral valve replacement.  Aortic Valve/Aorta: Normal trileaflet aortic valve.  Aortic Root: 3.2 cm.  Left Atrium: Normal left atrium.  LA volume index = 33  cc/m2.   Spontaneous echo contrast seen.   No left atrial  or left atrial appendage thrombus.   Decreased left atrial  appendage velocities noted.  Left Ventricle: Endocardium not well visualized; grossly  normal left ventricular systolic function. Septal motion  consistent with cardiac surgery. Normal left ventricular  internal dimensions and wall thicknesses.  Right Heart: Right atrial enlargement. Right ventricular  enlargement with normal right ventricular systolic  function. Normal tricuspid valve. Mild tricuspid  regurgitation. Normal pulmonic valve.  Pericardium/Pleura: Normal pericardium with no pericardial  effusion.  Hemodynamic: Estimated right atrial pressure is 8 mm Hg.  Estimated right ventricular systolic pressure equals 31 mm  Hg, assuming right atrial pressure equals 8 mm Hg,  consistent with normal pulmonary pressures. Agitated saline  injection and color flow Doppler demonstrates no evidence  of a patent foramen ovale.  ------------------------------------------------------------------------  Conclusions:  1. Bioprosthetic mitral valve replacement.  Normal leaflet  motion.  2. Normal left atrium.  LA volume index = 33 cc/m2.  Spontaneous echo contrast seen.   No left atrial or left  atrial appendage thrombus.   Decreased left atrial  appendage velocities noted.  3. Normal left ventricular internal dimensions and wall  thicknesses.  4. Endocardium not well visualized; grossly normal left  ventricular systolic function. Septal motion consistent  with cardiac surgery.  Performed in the setting of atrial flutter with rapid  ventricular response.  
DATE OF SERVICE: 03-03-22 @ 13:35  CHIEF COMPLAINT:Patient is a 51y old  Female who presents with a chief complaint of   	        PAST MEDICAL & SURGICAL HISTORY:  Rheumatic fever  as a teen, s/p mitral valve repair 1991    TIA (transient ischemic attack)  on Aspirin    Afib    S/P mitral valve repair  1991    H/O right knee surgery    S/P MVR (mitral valve replacement)  Re op MVR (T)            REVIEW OF SYSTEMS:  CONSTITUTIONAL: No fever, weight loss, or fatigue  EYES: No eye pain, visual disturbances, or discharge  NECK: No pain or stiffness  RESPIRATORY: No cough, wheezing, chills or hemoptysis; No Shortness of Breath  CARDIOVASCULAR: No chest pain, palpitations, passing out, dizziness, or leg swelling  GASTROINTESTINAL: No abdominal or epigastric pain. No nausea, vomiting, or hematemesis; No diarrhea or constipation. No melena or hematochezia.  GENITOURINARY: No dysuria, frequency, hematuria, or incontinence  NEUROLOGICAL: No headaches, memory loss, loss of strength, numbness, or tremors  SKIN: No itching, burning, rashes, or lesions   LYMPH Nodes: No enlarged glands  ENDOCRINE: No heat or cold intolerance; No hair loss  MUSCULOSKELETAL: No joint pain or swelling; No muscle, back, or extremity pain    Medications:  MEDICATIONS  (STANDING):  rivaroxaban 20 milliGRAM(s) Oral with dinner  sotalol 80 milliGRAM(s) Oral every 12 hours    MEDICATIONS  (PRN):    	    PHYSICAL EXAM:  T(C): 36.7 (03-03-22 @ 04:28), Max: 37.1 (03-02-22 @ 19:15)  HR: 68 (03-03-22 @ 11:45) (5 - 130)  BP: 115/69 (03-03-22 @ 11:45) (92/56 - 136/65)  RR: 18 (03-03-22 @ 11:45) (16 - 22)  SpO2: 100% (03-03-22 @ 11:45) (96% - 100%)  Wt(kg): --  I&O's Summary    03 Mar 2022 07:01  -  03 Mar 2022 13:35  --------------------------------------------------------  IN: 240 mL / OUT: 0 mL / NET: 240 mL        Appearance: Normal	  HEENT:   Normal oral mucosa, PERRL, EOMI	  Cardiovascular: Normal S1 S2, No JVD, No murmurs, No edema  Respiratory: Lungs clear to auscultation	  Psychiatry: A & O  Gastrointestinal:  Soft, Non-tender, + BS	  Skin: No rashes, No ecchymoses, No cyanosis	  Neurologic: Non-focal  Extremities: wnl  TELEMETRY: 	    ECG:  	  RADIOLOGY:  OTHER: 	  	  LABS:	 	    CARDIAC MARKERS:                                11.2   5.71  )-----------( 261      ( 03 Mar 2022 05:31 )             38.9     03-03    139  |  105  |  15  ----------------------------<  107<H>  4.5   |  23  |  1.03    Ca    10.0      03 Mar 2022 05:31  Mg     2.0     03-03    TPro  7.9  /  Alb  4.3  /  TBili  0.7  /  DBili  x   /  AST  20  /  ALT  15  /  AlkPhos  95  03-02    proBNP: Serum Pro-Brain Natriuretic Peptide: 1416 pg/mL (03-02 @ 15:01)    Lipid Profile:   HgA1c:   TSH:     	        
DATE OF SERVICE: 03-04-22 @ 13:33  CHIEF COMPLAINT:Patient is a 51y old  Female who presents with a chief complaint of AFib RVR (04 Mar 2022 05:24)    	        PAST MEDICAL & SURGICAL HISTORY:  Rheumatic fever  as a teen, s/p mitral valve repair 1991    TIA (transient ischemic attack)  on Aspirin    Afib    S/P mitral valve repair  1991    H/O right knee surgery    S/P MVR (mitral valve replacement)  Re op MVR (T)            REVIEW OF SYSTEMS:  CONSTITUTIONAL: No fever, weight loss, or fatigue  EYES: No eye pain, visual disturbances, or discharge  NECK: No pain or stiffness  RESPIRATORY: No cough, wheezing, chills or hemoptysis; No Shortness of Breath  CARDIOVASCULAR: No chest pain, palpitations, passing out, dizziness, or leg swelling  GASTROINTESTINAL: No abdominal or epigastric pain. No nausea, vomiting, or hematemesis; No diarrhea or constipation. No melena or hematochezia.  GENITOURINARY: No dysuria, frequency, hematuria, or incontinence  NEUROLOGICAL: No headaches, memory loss, loss of strength, numbness, or tremors    MUSCULOSKELETAL: No joint pain or swelling; No muscle, back, or extremity pain    Medications:  MEDICATIONS  (STANDING):  rivaroxaban 20 milliGRAM(s) Oral with dinner  sotalol 80 milliGRAM(s) Oral every 12 hours    MEDICATIONS  (PRN):  acetaminophen     Tablet .. 650 milliGRAM(s) Oral every 6 hours PRN Temp greater or equal to 38C (100.4F), Moderate Pain (4 - 6)    	    PHYSICAL EXAM:  T(C): 36.4 (03-04-22 @ 08:41), Max: 36.6 (03-04-22 @ 04:59)  HR: 63 (03-04-22 @ 08:41) (62 - 63)  BP: 113/82 (03-04-22 @ 08:41) (95/61 - 113/82)  RR: 17 (03-04-22 @ 08:41) (17 - 18)  SpO2: 98% (03-04-22 @ 08:41) (98% - 98%)  Wt(kg): --  I&O's Summary    03 Mar 2022 07:01  -  04 Mar 2022 07:00  --------------------------------------------------------  IN: 240 mL / OUT: 0 mL / NET: 240 mL    04 Mar 2022 07:01  -  04 Mar 2022 13:33  --------------------------------------------------------  IN: 480 mL / OUT: 0 mL / NET: 480 mL        Appearance: Normal	  HEENT:   Normal oral mucosa, PERRL, EOMI	  Lymphatic: No lymphadenopathy  Cardiovascular: Normal S1 S2, No JVD, No murmurs, No edema  Respiratory: Lungs clear to auscultation	  Psychiatry: A & O x 3, Mood & affect appropriate  Gastrointestinal:  Soft, Non-tender, + BS	  Skin: No rashes, No ecchymoses, No cyanosis	  Neurologic: Non-focal  Extremities: Normal range of motion, No clubbing, cyanosis or edema  Vascular: Peripheral pulses palpable 2+ bilaterally    TELEMETRY: 	    ECG:  	  RADIOLOGY:  OTHER: 	  	  LABS:	 	    CARDIAC MARKERS:                                11.2   5.71  )-----------( 261      ( 03 Mar 2022 05:31 )             38.9     03-03    139  |  105  |  15  ----------------------------<  107<H>  4.5   |  23  |  1.03    Ca    10.0      03 Mar 2022 05:31  Mg     2.0     03-03    TPro  7.9  /  Alb  4.3  /  TBili  0.7  /  DBili  x   /  AST  20  /  ALT  15  /  AlkPhos  95  03-02    proBNP:   Lipid Profile:   HgA1c:   TSH:     	        
Subjective/Observations: Patient is a 52 y/o female with PMHx of CVA (10y ago with no residual), s/p right knee surgery, MR 2/2 rheumatic heart disease, s/p MV repair (1991), and s/p redosternotomy MVR (T) in 1/23/18, AF diagnosed 2/2018 on Xarelto, s/p multiple DCCV most recently on 1/28/22.  She states since her DCCV on 1/28 that she has been taking her Xarelto every day without missing doses, she also takes Metoprolol. She presents to the ED today c/o lightheadedness and PALACIOS x1 day, intermittent CP. She states she has been in bed all day because when she ambulates she feels like she is going to pass out. In ED Telemetry reveals AF with 's, Sotalol 80mg x 1 dose given then admitted to Tele. Metoprolol was discontinued.   on 3/3 s/p DCCV with 200 Jules, converted to SR. Sotalol 80mg Q 12 hours started.        REVIEW OF SYSTEMS: All other review of systems is negative unless indicated above    MEDICATIONS  (STANDING):  rivaroxaban 20 milliGRAM(s) Oral with dinner  sotalol 80 milliGRAM(s) Oral every 12 hours    MEDICATIONS  (PRN):      Allergies    IV Contrast (Urticaria)    Intolerances      Vital Signs Last 24 Hrs  T(C): 36.6 (04 Mar 2022 04:59), Max: 36.6 (04 Mar 2022 04:59)  T(F): 97.9 (04 Mar 2022 04:59), Max: 97.9 (04 Mar 2022 04:59)  HR: 62 (04 Mar 2022 04:59) (5 - 130)  BP: 95/61 (04 Mar 2022 04:59) (92/56 - 122/88)  BP(mean): 71 (04 Mar 2022 04:59) (71 - 88)  RR: 18 (04 Mar 2022 04:59) (16 - 22)  SpO2: 98% (04 Mar 2022 04:59) (96% - 100%)          I&O's Summary    03 Mar 2022 07:01  -  04 Mar 2022 05:25  --------------------------------------------------------  IN: 240 mL / OUT: 0 mL / NET: 240 mL      Weight (kg): 69.9 (03-03 @ 09:01)    PHYSICAL EXAM:  General: WN/WD NAD  Neurology: Awake, nonfocal, GONZALEZ x 4  Respiratory: CTA B/L, No wheezing, rales, rhonchi  CV: RRR, S1S2, no murmurs, rubs or gallops  Abdominal: Soft, NT, ND +BS,   Extremities: No edema, + peripheral pulses  Skin: No Rashes, Hematoma, Ecchymosis  Psych: Oriented x 3, normal affect      LABS: All Labs Reviewed:                        11.2   5.71  )-----------( 261      ( 03 Mar 2022 05:31 )             38.9                         12.1   6.34  )-----------( 285      ( 02 Mar 2022 15:01 )             42.3     03 Mar 2022 05:31    139    |  105    |  15     ----------------------------<  107    4.5     |  23     |  1.03   02 Mar 2022 15:01    139    |  103    |  12     ----------------------------<  119    4.7     |  23     |  1.10     Ca    10.0       03 Mar 2022 05:31  Ca    10.2       02 Mar 2022 15:01  Mg     2.0       03 Mar 2022 05:31  Mg     2.1       02 Mar 2022 15:01    TPro  7.9    /  Alb  4.3    /  TBili  0.7    /  DBili  x      /  AST  20     /  ALT  15     /  AlkPhos  95     02 Mar 2022 15:01    PT/INR - ( 02 Mar 2022 15:01 )   PT: 19.1 sec;   INR: 1.65 ratio         PTT - ( 02 Mar 2022 15:01 )  PTT:38.1 sec

## 2022-04-12 ENCOUNTER — NON-APPOINTMENT (OUTPATIENT)
Age: 52
End: 2022-04-12

## 2022-04-12 ENCOUNTER — APPOINTMENT (OUTPATIENT)
Dept: ELECTROPHYSIOLOGY | Facility: CLINIC | Age: 52
End: 2022-04-12
Payer: COMMERCIAL

## 2022-04-12 VITALS
HEART RATE: 60 BPM | HEIGHT: 67 IN | RESPIRATION RATE: 14 BRPM | WEIGHT: 154 LBS | SYSTOLIC BLOOD PRESSURE: 126 MMHG | DIASTOLIC BLOOD PRESSURE: 75 MMHG | OXYGEN SATURATION: 99 % | BODY MASS INDEX: 24.17 KG/M2

## 2022-04-12 PROCEDURE — 93000 ELECTROCARDIOGRAM COMPLETE: CPT

## 2022-04-12 PROCEDURE — 99214 OFFICE O/P EST MOD 30 MIN: CPT

## 2022-04-12 RX ORDER — METOPROLOL SUCCINATE 50 MG/1
50 TABLET, EXTENDED RELEASE ORAL DAILY
Refills: 0 | Status: DISCONTINUED | COMMUNITY
End: 2022-04-12

## 2022-04-12 RX ORDER — METOPROLOL SUCCINATE 25 MG/1
25 TABLET, EXTENDED RELEASE ORAL DAILY
Refills: 0 | Status: DISCONTINUED | COMMUNITY
End: 2022-04-12

## 2022-04-12 RX ORDER — ASPIRIN ENTERIC COATED TABLETS 81 MG 81 MG/1
81 TABLET, DELAYED RELEASE ORAL
Refills: 0 | Status: DISCONTINUED | COMMUNITY
End: 2022-04-12

## 2022-04-15 NOTE — DISCUSSION/SUMMARY
[FreeTextEntry1] : In summary, Ms. Lee is a 51 year old woman with a history of atrial arrhythmia in the setting of rheumatic heart disease  with prior bioprosthetic MVR. She recently underwent a cardioversion with Sotalol initiation and remains in sinus rhythm. We discussed the pathophysiology of atrial arrhythmias including management strategies. We discussed options of continued medication management versus ablation. We informed her that is she would like to come off medications, she can consider an ablation. Her ECG demonstrates a stable QTc on the upper ends of normal. At this time, she wants to continue Xarelto and Sotalol and will consider ablation. She will follow up in [facility]. \par

## 2022-04-15 NOTE — HISTORY OF PRESENT ILLNESS
[FreeTextEntry1] : I had the pleasure of seeing Kassandra Lee today for follow up in the arrhythmia clinic at [facility]. As you well know, she is a pleasant 51 year old woman with a history of atrial arrhythmias and underwent a recent cardioversion on March 3, 2022 with Sotalol initiation. She has remained in sinus rhythm since then. She does report feeling tired on the Sotalol. She denies any palpitations, shortness of breath, chest pain, near syncope or syncope. She also remains on Xarelto.\par

## 2022-04-15 NOTE — CARDIOLOGY SUMMARY
[de-identified] : today:\par SB @ 59 bpm; QTC ~475 ms\par \par  [de-identified] : 2/5/2018:\par MVR (T), Mild AI, Estimated LVEF= 54%\par \par DONOVAN: 5/2021\par EF 50%

## 2022-04-15 NOTE — PHYSICAL EXAM
[Well Developed] : well developed [Well Nourished] : well nourished [No Acute Distress] : no acute distress [Normal Conjunctiva] : normal conjunctiva [Normal Venous Pressure] : normal venous pressure [No Carotid Bruit] : no carotid bruit [Normal S1, S2] : normal S1, S2 [No Rub] : no rub [No Gallop] : no gallop [Clear Lung Fields] : clear lung fields [Good Air Entry] : good air entry [No Respiratory Distress] : no respiratory distress  [Soft] : abdomen soft [Non Tender] : non-tender [Normal Bowel Sounds] : normal bowel sounds [Normal Gait] : normal gait [No Edema] : no edema [No Cyanosis] : no cyanosis [No Varicosities] : no varicosities [No Rash] : no rash [Moves all extremities] : moves all extremities [No Focal Deficits] : no focal deficits [Normal Speech] : normal speech [Alert and Oriented] : alert and oriented [Normal memory] : normal memory [de-identified] : III/VI systolic M

## 2022-05-02 ENCOUNTER — APPOINTMENT (OUTPATIENT)
Dept: ORTHOPEDIC SURGERY | Facility: CLINIC | Age: 52
End: 2022-05-02
Payer: COMMERCIAL

## 2022-05-02 VITALS
HEART RATE: 56 BPM | BODY MASS INDEX: 23.3 KG/M2 | WEIGHT: 145 LBS | SYSTOLIC BLOOD PRESSURE: 149 MMHG | DIASTOLIC BLOOD PRESSURE: 86 MMHG | HEIGHT: 66 IN

## 2022-05-02 DIAGNOSIS — M54.9 DORSALGIA, UNSPECIFIED: ICD-10-CM

## 2022-05-02 PROCEDURE — 99204 OFFICE O/P NEW MOD 45 MIN: CPT

## 2022-05-02 PROCEDURE — 72110 X-RAY EXAM L-2 SPINE 4/>VWS: CPT

## 2022-05-02 RX ORDER — SOTALOL HYDROCHLORIDE 80 MG/1
80 TABLET ORAL
Qty: 180 | Refills: 1 | Status: ACTIVE | COMMUNITY
Start: 1900-01-01 | End: 1900-01-01

## 2022-05-02 NOTE — PHYSICAL EXAM
[de-identified] : Lumbar Physical Exam\par \par Gait - Normal\par \par Station - Normal\par \par Sagittal balance - Normal\par \par Compensatory mechanism? - None\par \par Heel walk - Normal\par \par Toe walk - Normal\par \par Reflexes\par Patellar - normal\par Gastroc - normal\par Clonus - No\par \par Hip Exam - Normal\par \par Straight leg raise - none\par \par Pulses - 2+ dp/pt\par \par Range of motion - normal\par \par Sensation \par Sensation intact to light touch in L1, L2, L3, L4, L5 and S1 dermatomes bilaterally\par \par Motor\par 	IP	Quad	HS	TA	Gastroc	EHL\par Right	5/5	5/5	5/5	5/5	5/5	5/5\par Left	5/5	5/5	5/5	5/5	5/5	5/5 [de-identified] : Lumbar radiographs\par No instability noted\par Disc degeneration noted

## 2022-05-02 NOTE — HISTORY OF PRESENT ILLNESS
[de-identified] : This is a 51-year-old female here today for evaluation of her low back pain.  She has been dealing with the symptoms for approximately 2 to 3 days.  Over that time she has been dealing with significant right lower back pain.  She has no radiating symptoms.  She denies any bowel bladder issues.  She denies any saddle anesthesia.  She is here today due to this severe disabling pain.

## 2022-05-04 RX ORDER — AMOXICILLIN 500 MG/1
500 CAPSULE ORAL
Qty: 4 | Refills: 0 | Status: COMPLETED | COMMUNITY
Start: 2022-04-29

## 2022-06-16 ENCOUNTER — NON-APPOINTMENT (OUTPATIENT)
Age: 52
End: 2022-06-16

## 2022-06-20 ENCOUNTER — TRANSCRIPTION ENCOUNTER (OUTPATIENT)
Age: 52
End: 2022-06-20

## 2022-08-01 ENCOUNTER — NON-APPOINTMENT (OUTPATIENT)
Age: 52
End: 2022-08-01

## 2022-08-04 ENCOUNTER — NON-APPOINTMENT (OUTPATIENT)
Age: 52
End: 2022-08-04

## 2022-08-24 NOTE — H&P ADULT - EXTREMITIES
Skin, right cheek, shave biopsy:   -SQUAMOUS CELL CARCINOMA, EXTENDING TO THE DEEP BIOPSY EDGE    Recommend patient to Dr. Gil for Mohs surgery consultation. Picture in chart.     General derm team: Sent patient for Mohs surgery. F/u with me in 6 months.       No cyanosis, clubbing or edema

## 2022-09-28 ENCOUNTER — EMERGENCY (EMERGENCY)
Facility: HOSPITAL | Age: 52
LOS: 1 days | Discharge: ANOTHER HOSPITAL | End: 2022-09-28
Attending: STUDENT IN AN ORGANIZED HEALTH CARE EDUCATION/TRAINING PROGRAM | Admitting: STUDENT IN AN ORGANIZED HEALTH CARE EDUCATION/TRAINING PROGRAM

## 2022-09-28 ENCOUNTER — INPATIENT (INPATIENT)
Facility: HOSPITAL | Age: 52
LOS: 4 days | Discharge: HOME | DRG: 24 | End: 2022-10-03
Attending: PSYCHIATRY & NEUROLOGY | Admitting: PSYCHIATRY & NEUROLOGY
Payer: COMMERCIAL

## 2022-09-28 VITALS
SYSTOLIC BLOOD PRESSURE: 153 MMHG | OXYGEN SATURATION: 100 % | DIASTOLIC BLOOD PRESSURE: 109 MMHG | HEART RATE: 117 BPM | RESPIRATION RATE: 19 BRPM

## 2022-09-28 VITALS
HEIGHT: 67 IN | SYSTOLIC BLOOD PRESSURE: 169 MMHG | DIASTOLIC BLOOD PRESSURE: 123 MMHG | OXYGEN SATURATION: 99 % | WEIGHT: 146.61 LBS | HEART RATE: 116 BPM

## 2022-09-28 VITALS
OXYGEN SATURATION: 98 % | WEIGHT: 146.61 LBS | SYSTOLIC BLOOD PRESSURE: 158 MMHG | HEART RATE: 116 BPM | TEMPERATURE: 98 F | RESPIRATION RATE: 17 BRPM | HEIGHT: 67 IN | DIASTOLIC BLOOD PRESSURE: 100 MMHG

## 2022-09-28 DIAGNOSIS — Z95.2 PRESENCE OF PROSTHETIC HEART VALVE: Chronic | ICD-10-CM

## 2022-09-28 DIAGNOSIS — Z98.890 OTHER SPECIFIED POSTPROCEDURAL STATES: Chronic | ICD-10-CM

## 2022-09-28 DIAGNOSIS — I63.9 CEREBRAL INFARCTION, UNSPECIFIED: ICD-10-CM

## 2022-09-28 DIAGNOSIS — Z98.89 OTHER SPECIFIED POSTPROCEDURAL STATES: Chronic | ICD-10-CM

## 2022-09-28 LAB
ALBUMIN SERPL ELPH-MCNC: 4.3 G/DL — SIGNIFICANT CHANGE UP (ref 3.3–5)
ALP SERPL-CCNC: 90 U/L — SIGNIFICANT CHANGE UP (ref 40–120)
ALT FLD-CCNC: 19 U/L — SIGNIFICANT CHANGE UP (ref 4–33)
ANION GAP SERPL CALC-SCNC: 10 MMOL/L — SIGNIFICANT CHANGE UP (ref 7–14)
APTT BLD: 39.3 SEC — HIGH (ref 27–36.3)
AST SERPL-CCNC: 36 U/L — HIGH (ref 4–32)
BASOPHILS # BLD AUTO: 0.03 K/UL — SIGNIFICANT CHANGE UP (ref 0–0.2)
BASOPHILS NFR BLD AUTO: 0.5 % — SIGNIFICANT CHANGE UP (ref 0–2)
BILIRUB SERPL-MCNC: 0.4 MG/DL — SIGNIFICANT CHANGE UP (ref 0.2–1.2)
BUN SERPL-MCNC: 8 MG/DL — SIGNIFICANT CHANGE UP (ref 7–23)
CALCIUM SERPL-MCNC: 9.2 MG/DL — SIGNIFICANT CHANGE UP (ref 8.4–10.5)
CHLORIDE SERPL-SCNC: 104 MMOL/L — SIGNIFICANT CHANGE UP (ref 98–107)
CO2 SERPL-SCNC: 22 MMOL/L — SIGNIFICANT CHANGE UP (ref 22–31)
CREAT SERPL-MCNC: 0.87 MG/DL — SIGNIFICANT CHANGE UP (ref 0.5–1.3)
EGFR: 79 ML/MIN/1.73M2 — SIGNIFICANT CHANGE UP
EOSINOPHIL # BLD AUTO: 0.28 K/UL — SIGNIFICANT CHANGE UP (ref 0–0.5)
EOSINOPHIL NFR BLD AUTO: 4.2 % — SIGNIFICANT CHANGE UP (ref 0–6)
FLUAV AG NPH QL: SIGNIFICANT CHANGE UP
FLUBV AG NPH QL: SIGNIFICANT CHANGE UP
GLUCOSE SERPL-MCNC: 120 MG/DL — HIGH (ref 70–99)
HCT VFR BLD CALC: 46.3 % — HIGH (ref 34.5–45)
HGB BLD-MCNC: 14.1 G/DL — SIGNIFICANT CHANGE UP (ref 11.5–15.5)
IANC: 3.56 K/UL — SIGNIFICANT CHANGE UP (ref 1.8–7.4)
IMM GRANULOCYTES NFR BLD AUTO: 0.2 % — SIGNIFICANT CHANGE UP (ref 0–0.9)
INR BLD: 1.97 RATIO — HIGH (ref 0.88–1.16)
LYMPHOCYTES # BLD AUTO: 2.05 K/UL — SIGNIFICANT CHANGE UP (ref 1–3.3)
LYMPHOCYTES # BLD AUTO: 31.1 % — SIGNIFICANT CHANGE UP (ref 13–44)
MCHC RBC-ENTMCNC: 24.7 PG — LOW (ref 27–34)
MCHC RBC-ENTMCNC: 30.5 GM/DL — LOW (ref 32–36)
MCV RBC AUTO: 81.1 FL — SIGNIFICANT CHANGE UP (ref 80–100)
MONOCYTES # BLD AUTO: 0.67 K/UL — SIGNIFICANT CHANGE UP (ref 0–0.9)
MONOCYTES NFR BLD AUTO: 10.2 % — SIGNIFICANT CHANGE UP (ref 2–14)
NEUTROPHILS # BLD AUTO: 3.56 K/UL — SIGNIFICANT CHANGE UP (ref 1.8–7.4)
NEUTROPHILS NFR BLD AUTO: 53.8 % — SIGNIFICANT CHANGE UP (ref 43–77)
NRBC # BLD: 0 /100 WBCS — SIGNIFICANT CHANGE UP (ref 0–0)
NRBC # FLD: 0 K/UL — SIGNIFICANT CHANGE UP (ref 0–0)
PLATELET # BLD AUTO: 184 K/UL — SIGNIFICANT CHANGE UP (ref 150–400)
POTASSIUM SERPL-MCNC: 5.1 MMOL/L — SIGNIFICANT CHANGE UP (ref 3.5–5.3)
POTASSIUM SERPL-SCNC: 5.1 MMOL/L — SIGNIFICANT CHANGE UP (ref 3.5–5.3)
PROT SERPL-MCNC: 7.6 G/DL — SIGNIFICANT CHANGE UP (ref 6–8.3)
PROTHROM AB SERPL-ACNC: 23 SEC — HIGH (ref 10.5–13.4)
RBC # BLD: 5.71 M/UL — HIGH (ref 3.8–5.2)
RBC # FLD: 14.5 % — SIGNIFICANT CHANGE UP (ref 10.3–14.5)
RSV RNA NPH QL NAA+NON-PROBE: SIGNIFICANT CHANGE UP
SARS-COV-2 RNA SPEC QL NAA+PROBE: SIGNIFICANT CHANGE UP
SODIUM SERPL-SCNC: 136 MMOL/L — SIGNIFICANT CHANGE UP (ref 135–145)
TROPONIN T, HIGH SENSITIVITY RESULT: 25 NG/L — SIGNIFICANT CHANGE UP
WBC # BLD: 6.6 K/UL — SIGNIFICANT CHANGE UP (ref 3.8–10.5)
WBC # FLD AUTO: 6.6 K/UL — SIGNIFICANT CHANGE UP (ref 3.8–10.5)

## 2022-09-28 PROCEDURE — 61645 PERQ ART M-THROMBECT &/NFS: CPT | Mod: LT

## 2022-09-28 PROCEDURE — 70496 CT ANGIOGRAPHY HEAD: CPT | Mod: 26,MA

## 2022-09-28 PROCEDURE — 0042T: CPT | Mod: MA

## 2022-09-28 PROCEDURE — 99285 EMERGENCY DEPT VISIT HI MDM: CPT | Mod: 25

## 2022-09-28 PROCEDURE — 70498 CT ANGIOGRAPHY NECK: CPT | Mod: 26,MA

## 2022-09-28 PROCEDURE — 70496 CT ANGIOGRAPHY HEAD: CPT | Mod: 26

## 2022-09-28 PROCEDURE — 70450 CT HEAD/BRAIN W/O DYE: CPT | Mod: 26,59,MA

## 2022-09-28 PROCEDURE — 93010 ELECTROCARDIOGRAM REPORT: CPT

## 2022-09-28 PROCEDURE — 99291 CRITICAL CARE FIRST HOUR: CPT

## 2022-09-28 RX ORDER — DIPHENHYDRAMINE HCL 50 MG
50 CAPSULE ORAL ONCE
Refills: 0 | Status: COMPLETED | OUTPATIENT
Start: 2022-09-28 | End: 2022-09-28

## 2022-09-28 RX ORDER — ALTEPLASE 100 MG
53.9 KIT INTRAVENOUS ONCE
Refills: 0 | Status: DISCONTINUED | OUTPATIENT
Start: 2022-09-28 | End: 2022-09-28

## 2022-09-28 RX ORDER — DIPHENHYDRAMINE HCL 50 MG
50 CAPSULE ORAL ONCE
Refills: 0 | Status: DISCONTINUED | OUTPATIENT
Start: 2022-09-28 | End: 2022-09-28

## 2022-09-28 RX ORDER — ALTEPLASE 100 MG
6 KIT INTRAVENOUS ONCE
Refills: 0 | Status: DISCONTINUED | OUTPATIENT
Start: 2022-09-28 | End: 2022-09-28

## 2022-09-28 RX ADMIN — Medication 50 MILLIGRAM(S): at 19:19

## 2022-09-28 RX ADMIN — Medication 125 MILLIGRAM(S): at 19:50

## 2022-09-28 NOTE — PRE-ANESTHESIA EVALUATION ADULT - NSRADCARDRESULTSFT_GEN_ALL_CORE
TTE 5/21: EF55%  Conclusions:  1. Bioprosthetic mitral valve replacement.  Normal leaflet  motion.  2. Normal left atrium.  LA volume index = 33 cc/m2.  Spontaneous echo contrast seen.   No left atrial or left  atrial appendage thrombus.   Decreased left atrial  appendage velocities noted.  3. Normal left ventricular internal dimensions and wall  thicknesses.  4. Endocardium not well visualized; grossly normal left  ventricular systolic function. Septal motion consistent  with cardiac surgery.  Performed in the setting of atrial flutter with rapid  ventricular response.

## 2022-09-28 NOTE — CHART NOTE - NSCHARTNOTEFT_GEN_A_CORE
Interventional Neuro- Radiology   Procedure Note    Procedure: Selective Cerebral Angiography and Thrombectomy  Pre- Procedure Diagnosis: L M2 occlusions stroke  Post- Procedure Diagnosis: tici 2c recanalization post-suction thrombectomy    : Dr. Eliseo High MD  Fellow: Dr. Mcclure  Physician Assistant: Lizett Bagley PA-C    RN: Aspen  Tech: Luis/ Parviz    Anesthesia: Dr. Yelena Ding MD  (general anesthesia)    I/Os:  Fluids:  Trotter:  Contrast:  Estimated Blood Loss: <10cc    NIHSS post procedure:     Preliminary Report:  Under general anesthesia, using a 8Fr short and BMX sheath to the right groin examination of left internal carotid artery\ via selective cerebral angiography demonstrates L M2 occlusion stroke; tici 2c recanalization post-suction thrombectomy. (Official note to follow).    Patient tolerated procedure well, vital signs stable, hemodynamically stable, no change in neurological status compared to baseline. Results discussed with neurosurgery/ patient and their family. Groin sheath d/c'ed, manual compression held to hemostasis, no active bleeding, no hematoma, celt device applied, quick clot and safeguard balloon dressing applied at 22:45h. stroke order set post-thrombectomy ordered. Patient transferred to NSCU for further care/ monitoring.     Lizett Bagley PA-C Interventional Neuro- Radiology   Procedure Note    Procedure: Selective Cerebral Angiography and Thrombectomy  Pre- Procedure Diagnosis: L M2 occlusions stroke  Post- Procedure Diagnosis: tici 2c recanalization post-suction thrombectomy    : Dr. Eliseo High MD  Fellow: Dr. Mcclure  Physician Assistant: Lizett Bagley PA-C    RN: Aspen  Tech: Abdulaziz Jj    Anesthesia: Dr. Yelena Ding MD  (general anesthesia)    I/Os:  Fluids: 400 cc  Trotter: DTV  Contrast: 41 cc  Estimated Blood Loss: <10cc    NIHSS post procedure: 7    Preliminary Report:  Under general anesthesia, using a 8Fr short and BMX sheath to the right groin examination of left internal carotid artery\ via selective cerebral angiography demonstrates L M2 occlusion stroke; tici 2c recanalization post-suction thrombectomy. (Official note to follow).    Patient tolerated procedure well, vital signs stable, hemodynamically stable, no change in neurological status compared to baseline. Results discussed with neurosurgery/ patient and their family. Groin sheath d/c'ed, manual compression held to hemostasis, no active bleeding, no hematoma, celt device applied, quick clot and safeguard balloon dressing applied at 22:45h. stroke order set post-thrombectomy ordered. Patient transferred to NSCU for further care/ monitoring.     Lizett Bagley PA-C

## 2022-09-28 NOTE — CHART NOTE - NSCHARTNOTEFT_GEN_A_CORE
Interventional Neuro Radiology  Pre-Procedure Note    This is a 52y ____ hand dominant Female      HPI:      NIHSS @[facility] on arrival    Neuro Exam: Awake and alert, oriented x3, fluent, normal naming and repetition, follows 3 step commands. Extraocular movements intact, no nystagmus, visual fields full, face symmetric, tongue midline. No drift, 5/5 power x 4 extremities. Normal sensation to LT. Normal finger-to-nose and rapid alternating movements.    PAST MEDICAL & SURGICAL HISTORY:  Rheumatic fever  as a teen, s/p mitral valve repair 1991      TIA (transient ischemic attack)  on Aspirin      Afib      S/P mitral valve repair  1991      H/O right knee surgery      S/P MVR (mitral valve replacement)  Re op MVR (T)          Social History:   Denies tobacco use    FAMILY HISTORY:  Family history of acute myocardial infarction (Father)      No pertinent family history    Allergies: IV Contrast (Urticaria)      Current Medications:     Labs:                         14.1   6.60  )-----------( 184      ( 28 Sep 2022 19:00 )             46.3       09-28    136  |  104  |  8   ----------------------------<  120<H>  5.1   |  22  |  0.87    Ca    9.2      28 Sep 2022 19:00    TPro  7.6  /  Alb  4.3  /  TBili  0.4  /  DBili  x   /  AST  36<H>  /  ALT  19  /  AlkPhos  90  09-28        HCG:     Blood Bank:     Assessment/Plan:   This is a 52y ____ hand dominant Female  presents with ______. Patient presents to neuro-IR for selective cerebral angiography and thrombectomy. Procedure/ risks/ benefits/ goals/ alternatives were explained. Risks include but are not limited to stroke/ vessel injury/ hemorrhage/ groin hematoma. All questions answered. Informed content obtained from patient. Unable to obtain consent from patient/ family member prior to procedure. Consent placed in chart. H and P not completed due to emergent nature of procedure. Interventional Neuro Radiology  Pre-Procedure Note      This is a 52y (15-Trent-1970) RH woman with a PMHx significant for RF s/p valve transplant and afib on eliquis (last dose at 1700) who presented to the [facility] ED with aphagia. Pt was sitting at the dinner table with her  when he noticed that she was unresponsive and not moving her right hand @19:05. Upon arrival to the ED pt had NIHSS of 18 with aphagia and Right hemiparesis. pt not a TPA candidate due to INR 1.97, PTT 39.3. CTA showed Left MCA occlusion in the M2 distribution. Call was placed to endovascular at [facility] who accepted the patient for MT. Pt premedicated with Benadryl 50 mg and Solumedrol 125mg for iv contrast allergy.      NIHSS 18 @[facility] on arrival    Fellow notified @ 21:09    Neuro Exam:   Mental status - Awake, Alert, with right hemineglect and left gaze preference does cross midline. Global aphagia (expressive >Receptive). Unable to follow simple commands.    Cranial nerves - Left pupil 4mm right pupil 2-3mm equally reactive, with right hemineglect and left gaze preference does cross midline, unable to protrude tongue   Motor - RUE 0/5, LUE 5/5, RLE 1/5, LUE 5/5  Sensation - unable to assess due to change in mental status   DTR's -             Biceps      Triceps     Brachioradialis      Patellar    Ankle    Toes/plantar response  R             1+             1+                  1+                       1+           1+                 mute   L              1+             1+                 1+                        1+           1+                 mute  Coordination -  unable to assess due to change in mental status   Gait and station -   unable to assess due to change in mental status       PAST MEDICAL & SURGICAL HISTORY:  Rheumatic fever  as a teen, s/p mitral valve repair 1991      TIA (transient ischemic attack)  on Aspirin      Afib      S/P mitral valve repair  1991      H/O right knee surgery      S/P MVR (mitral valve replacement)  Re op MVR (T)          Social History:   Denies tobacco use    FAMILY HISTORY:  Family history of acute myocardial infarction (Father)      No pertinent family history    Allergies: IV Contrast (Urticaria)      Current Medications:   Eliquis  · 	sotalol 80 mg oral tablet: 1 tab(s) orally every 12 hours  · 	acetaminophen 325 mg oral tablet: 2 tab(s) orally every 6 hours, As needed, Temp greater or equal to 38C (100.4F), Moderate Pain (4 - 6)  · 	Xarelto 20 mg oral tablet: 1 tab(s) orally once a day  · 	multivitamin:   · 	Vitamin B12:     Labs:                         14.1   6.60  )-----------( 184      ( 28 Sep 2022 19:00 )             46.3       09-28    136  |  104  |  8   ----------------------------<  120<H>  5.1   |  22  |  0.87    Ca    9.2      28 Sep 2022 19:00    TPro  7.6  /  Alb  4.3  /  TBili  0.4  /  DBili  x   /  AST  36<H>  /  ALT  19  /  AlkPhos  90  09-28        HCG: negative    Blood Bank: Blood Available    Assessment/Plan:   This is a 51y/o Female who presents with L [id number] stroke. Patient presents to neuro-IR for selective cerebral angiography and thrombectomy. Procedure/ risks/ benefits/ goals/ alternatives were explained. Risks include but are not limited to stroke/ vessel injury/ hemorrhage/ groin hematoma. All questions answered. Informed content obtained from patient. Informed consent obtained from patient's son. Consent placed in chart. H and P not completed due to emergent nature of procedure.    Lizett Bagley PA-C Interventional Neuro Radiology  Pre-Procedure Note      This is a 52y (15-Trent-1970) RH woman with a PMHx significant for RF, MVR s/p valve transplant and afib on eliquis (last dose at 1700) who presented to the [facility] ED with aphasia. Pt was sitting at the dinner table with her  when he noticed that she was unresponsive and not moving her right hand @19:05. Upon arrival to the ED pt had NIHSS of 18 with aphagia and Right hemiparesis. pt not a TPA candidate due to INR 1.97, PTT 39.3. CTA showed Left MCA occlusion in the M2 distribution. Call was placed to endovascular at [facility] who accepted the patient for MT. Pt premedicated with Benadryl 50 mg and Solumedrol 125mg for iv contrast allergy.      NIHSS 18 @[facility] on arrival    Fellow notified @ 21:09    Neuro Exam:   Mental status - Awake, Alert, with right hemineglect and left gaze preference does cross midline. Global aphasia (expressive >Receptive). Unable to follow simple commands.    Cranial nerves - Left pupil 4mm right pupil 2-3mm equally reactive, with right hemineglect and left gaze preference does cross midline, unable to protrude tongue   Motor - RUE 0/5, LUE 5/5, RLE 1/5, LUE 5/5  Sensation - unable to assess due to change in mental status   DTR's -             Biceps      Triceps     Brachioradialis      Patellar    Ankle    Toes/plantar response  R             1+             1+                  1+                       1+           1+                 mute   L              1+             1+                 1+                        1+           1+                 mute  Coordination -  unable to assess due to change in mental status   Gait and station -   unable to assess due to change in mental status       PAST MEDICAL & SURGICAL HISTORY:  Rheumatic fever  as a teen, s/p mitral valve repair 1991      TIA (transient ischemic attack)  on Aspirin      Afib      S/P mitral valve repair  1991      H/O right knee surgery      S/P MVR (mitral valve replacement)  Re op MVR (T)          Social History:   Denies tobacco use    FAMILY HISTORY:  Family history of acute myocardial infarction (Father)      No pertinent family history    Allergies: IV Contrast (Urticaria)      Current Medications:   Eliquis  · 	sotalol 80 mg oral tablet: 1 tab(s) orally every 12 hours  · 	acetaminophen 325 mg oral tablet: 2 tab(s) orally every 6 hours, As needed, Temp greater or equal to 38C (100.4F), Moderate Pain (4 - 6)  · 	Xarelto 20 mg oral tablet: 1 tab(s) orally once a day  · 	multivitamin:   · 	Vitamin B12:     Labs:                         14.1   6.60  )-----------( 184      ( 28 Sep 2022 19:00 )             46.3       09-28    136  |  104  |  8   ----------------------------<  120<H>  5.1   |  22  |  0.87    Ca    9.2      28 Sep 2022 19:00    TPro  7.6  /  Alb  4.3  /  TBili  0.4  /  DBili  x   /  AST  36<H>  /  ALT  19  /  AlkPhos  90  09-28        HCG: negative    Blood Bank: Blood Available    Assessment/Plan:   This is a 53y/o Female who presents with L [id number] stroke. Patient presents to neuro-IR for selective cerebral angiography and thrombectomy. Procedure/ risks/ benefits/ goals/ alternatives were explained. Risks include but are not limited to stroke/ vessel injury/ hemorrhage/ groin hematoma. All questions answered. Informed content obtained from patient. Informed consent obtained from patient's son. Consent placed in chart. H and P not completed due to emergent nature of procedure.    Lizett Bagley PA-C

## 2022-09-28 NOTE — ED PROVIDER NOTE - NS ED ATTENDING STATEMENT MOD
This was a shared visit with the JOSE ANGEL. I reviewed and verified the documentation and independently performed the documented:

## 2022-09-28 NOTE — CONSULT NOTE ADULT - SUBJECTIVE AND OBJECTIVE BOX
Neurology - Consult Note    -  Spectra: [phone] ([facility]), [phone] ([facility])  -    HPI: Patient SHARON MCKNIGHT is a 52y (15-Trent-1970) RH woman with a PMHx significant for RF s/p valve transplant and afib on eliquis (last dose at 1700) who presented to the ED with aphagia. Pt was sitting at the dinner table with her  when he noticed that she was unresponsive and not moving her right hand. Upon arrival to the ED pt had NIHSS of 18 with aphagia and Right hemiparesis. pt not a TPA candidate due to INR 1.97, PTT 39.3. CTA showed Left MCA occlusion in the M2 distribution Call was placed to endovascular at [facility] who accepted the patient for MT.          Review of Systems:    unable to assess due to change in mental status     Allergies:  IV Contrast (Urticaria)      PMHx/PSHx/Family Hx: As above, otherwise see below   Rheumatic fever    TIA (transient ischemic attack)    Afib         Medications:  MEDICATIONS  (STANDING):  diphenhydrAMINE IVPB 50 milliGRAM(s) IV Intermittent Once  methylPREDNISolone sodium succinate Injectable 125 milliGRAM(s) IV Push Once    MEDICATIONS  (PRN):      Vitals:  T(C): 36.7 (09-28-22 @ 19:41), Max: 36.7 (09-28-22 @ 19:41)  HR: 120 (09-28-22 @ 20:43) (107 - 120)  BP: 169/123 (09-28-22 @ 20:43) (158/100 - 187/121)  RR: 19 (09-28-22 @ 20:43) (17 - 20)  SpO2: 99% (09-28-22 @ 20:43) (98% - 100%)    Physical Examination:    General - NAD  Cardiovascular - Peripheral pulses palpable, no edema  Eyes -  Fundoscopy not performed due to safety precautions in the setting of the COVID-19 pandemic    Neurologic Exam:  Mental status - Awake, Alert, with right hemineglect and left gaze preference does cross midline. Global aphagia (expressive >Receptive). Unable to follow simple commands.      Cranial nerves - Left pupil 4mm right pupil 2-3mm equally reactive, with right hemineglect and left gaze preference does cross midline, unable to protrude tongue   Motor - RUE 0/5, LUE 5/5, RLE 1/5, LUE 5/5    Sensation - unable to assess due to change in mental status     DTR's -             Biceps      Triceps     Brachioradialis      Patellar    Ankle    Toes/plantar response  R             1+             1+                  1+                       1+           1+                 mute   L              1+             1+                 1+                        1+           1+                 mute    Coordination -  unable to assess due to change in mental status     Gait and station -   unable to assess due to change in mental status     Labs:                        14.1   6.60  )-----------( 184      ( 28 Sep 2022 19:00 )             46.3     09-28    136  |  104  |  8   ----------------------------<  120<H>  5.1   |  22  |  0.87    Ca    9.2      28 Sep 2022 19:00    TPro  7.6  /  Alb  4.3  /  TBili  0.4  /  DBili  x   /  AST  36<H>  /  ALT  19  /  AlkPhos  90  09-28    CAPILLARY BLOOD GLUCOSE        LIVER FUNCTIONS - ( 28 Sep 2022 19:00 )  Alb: 4.3 g/dL / Pro: 7.6 g/dL / ALK PHOS: 90 U/L / ALT: 19 U/L / AST: 36 U/L / GGT: x             PT/INR - ( 28 Sep 2022 19:00 )   PT: 23.0 sec;   INR: 1.97 ratio         PTT - ( 28 Sep 2022 19:00 )  PTT:39.3 sec         Radiology:    < from: CT Brain Perfusion Maps Stroke (09.28.22 @ 20:23) >  CT PERFUSION FINDINGS:  There a moderate region of decreased perfusion in the left frontal   parietal region.  The volume of ischemic tissue (core infarct and penumbra) measures 30 mL.  The core infarct measures 29 mL.  The mismatch volume (penumbra) measure 1 mL.  The mismatch ratio (ischemic tissue / core) is: 1.0  The hypoperfusion index (Tmax>10s/Tmax>6s) is: 0.6    IMPRESSION:  *  NO HEMODYNAMIC SIGNIFICANT NARROWING WITHIN THE NECK. QUESTION OF A   SMALL WEB WITHIN THE LEFT CAROTID BULB  *  THERE IS AN ANTERIOR DIVISION LEFT MCA M2 BRANCH OCCLUSION.  *  MODERATE PERFUSION DEFECT LEFT FRONTAL PARIETAL REGION WITH A CORE   INFARCT OF 29 ML AND PENUMBRA OF 1 ML    < end of copied text >

## 2022-09-28 NOTE — ED PROVIDER NOTE - PHYSICAL EXAMINATION
CONSTITUTIONAL: Well-appearing; well-nourished; in no apparent distress. Non-toxic appearing.   NEURO: Alert. No facial droop. Right hemineglect, with complete right upper extremity weakness and decreased in tone, right lower extremity with tone, moves RLE to painful stimuli.   EYES: Pupils are 3mm with slight right PAD, unable to assess EOMs pt unable to follow command. No lid edema, crusting, proptosis. Sclera white, conjunctiva pink.   CARD: Regular rate and rhythm, no murmurs  RESP: No accessory muscle use; breath sounds clear and equal bilaterally; no wheezes, rhonchi, or rales     ABD: Soft; non-distended; non-tender.   MUSCULOSKELETAL/EXTREMITIES: FROM in all four extremities; no extremity edema.  SKIN: Warm; dry; no apparent lesions or exudate CONSTITUTIONAL: Well-appearing; well-nourished; in no apparent distress. Non-toxic appearing.   NEURO: Alert. No facial droop. Right hemineglect, with complete right upper extremity weakness and decreased in tone, right lower extremity with tone, moves RLE to painful stimuli.   EYES: Pupils are 3mm with slight right PAD, unable to assess EOMs pt unable to follow command. No lid edema, crusting, proptosis. Sclera white, conjunctiva pink.   CARD: Irregularly irregular rhythm, no murmurs  RESP: No accessory muscle use; breath sounds clear and equal bilaterally; no wheezes, rhonchi, or rales     ABD: Soft; non-distended; non-tender.   MUSCULOSKELETAL/EXTREMITIES: FROM in all four extremities; no extremity edema.  SKIN: Warm; dry; no apparent lesions or exudate

## 2022-09-28 NOTE — ED ADULT NURSE NOTE - OBJECTIVE STATEMENT
p/t is a 52y old female on blood thinners,  received aphasic, last seen normal 1 hr ago, rt sided paralysis, does not follow command

## 2022-09-28 NOTE — ED PROVIDER NOTE - OBJECTIVE STATEMENT
53 y/o female BIB ambulance with  as a code stroke for right arm weakness, inability to speak or walk since 7:05pm. per  patient has hx of 2 heart surgeries because of leaky valves and is on blood thinner but unsure of name. He states that he came home around 5p, patient had finished working from home, they were sitting outside about to have dinner and then when he passed her the plate her right just fell limp and she started to gaze and could not respond so he called the ambulance. No other voiced complaints.    Upon charty review patient with hx of Afib, rheumatic fever, and MVR, on ASA.

## 2022-09-28 NOTE — PROGRESS NOTE ADULT - ASSESSMENT
ASSESSMENT/PLAN:    NEURO:  Activity: [] mobilize as tolerated [] Bedrest [] PT [] OT [] PMNR    PULM:    CV:  SBP goal    RENAL:  Fluids:    GI:  Diet:  GI prophylaxis [] not indicated [] PPI [] other:  Bowel regimen [] colace [] senna [] other:    ENDO:   Goal euglycemia (-180)    HEME/ONC:  VTE prophylaxis: [] SCDs [] chemoprophylaxis [] hold chemoprophylaxis due to: [] high risk of DVT/PE on admission due to:    ID:    MISC:    SOCIAL/FAMILY:  [] awaiting [] updated at bedside [] family meeting    CODE STATUS:  [] Full Code [] DNR [] DNI [] Palliative/Comfort Care    DISPOSITION:  [] ICU [] Stroke Unit [] Floor [] EMU [] RCU [] PCU    [] Patient is at high risk of neurologic deterioration/death due to:     Time seen:  Time spent: ___ [] critical care minutes    Contact: [phone] ASSESSMENT/PLAN: 56 y/o female with L MCA s/p mechanical thrombectomy with TICI 2C recannilization     NEURO:  Neurochecks Q1H  CTH post thrombectomy stable  CTH in the AM - will hold ASA and Lovenox (dvt ppx) until CTH   Stroke code measures  Will start patient on Atorvastatin 40 mg QD   Activity: [] mobilize as tolerated [x] Bedrest [] PT [] OT [] PMNR    PULM:  Breathing on Room air    CV:  SBP goal 100-140 mmHg  On Sotalol at home- family will confirm whether its a current med.   History of MV repair, history of rheumatic heart disease  Will consult cardiology    RENAL:  Fluids: IVF at 75 cc/hour    GI:  Diet: NPO  GI prophylaxis [x] not indicated [] PPI [] other:  Bowel regimen [x] Miralax [x] senna [] other:    ENDO:   Will obtain A1c   Goal euglycemia (-180)    HEME/ONC:  VTE prophylaxis: [x] SCDs [] chemoprophylaxis [] hold chemoprophylaxis until stability CTH     ID:  Afebrile, will obtain CBC     MISC:    SOCIAL/FAMILY:  [] awaiting [x] updated at bedside [] family meeting    CODE STATUS:  [x] Full Code [] DNR [] DNI [] Palliative/Comfort Care    DISPOSITION:  [x] ICU [] Stroke Unit [] Floor [] EMU [] RCU [] PCU   ASSESSMENT/PLAN: 56 y/o female with L MCA s/p mechanical thrombectomy with TICI 2C recannilization. Stroke day 0.    NEURO:  Neurochecks Q1H  CTH post thrombectomy stable  CTH in the AM - will hold ASA and Lovenox (dvt ppx) until CTH   Stroke code measures  Will start patient on Atorvastatin 40 mg QD   Activity: [] mobilize as tolerated [x] Bedrest [] PT [] OT [] PMNR    PULM:  Breathing on Room air    CV:  SBP goal 100-140 mmHg  On Sotalol at home- family will confirm whether its a current med.   History of MV repair, history of rheumatic heart disease  Will consult cardiology    RENAL:  Fluids: IVF at 75 cc/hour    GI:  Diet: NPO  GI prophylaxis [x] not indicated [] PPI [] other:  Bowel regimen [x] Miralax [x] senna [] other:    ENDO:   Will obtain A1c   Goal euglycemia (-180)    HEME/ONC:  VTE prophylaxis: [x] SCDs [] chemoprophylaxis [] hold chemoprophylaxis until stability CTH     ID:  Afebrile, will obtain CBC     MISC:    SOCIAL/FAMILY:  [] awaiting [x] updated at bedside [] family meeting    CODE STATUS:  [x] Full Code [] DNR [] DNI [] Palliative/Comfort Care    DISPOSITION:  [x] ICU [] Stroke Unit [] Floor [] EMU [] RCU [] PCU

## 2022-09-28 NOTE — PRE-ANESTHESIA EVALUATION ADULT - NSANTHPMHFT_GEN_ALL_CORE
53 y/o female, stroke aphasia, right arm weakness 7:05 pm today, arousable; s/p benadryl for IV contrast allergy, and IV solumedrol; rheumatic heart disease , s/p MV repair 1991, s/p MV replacement 2018, afib last cadioverted and in sinus 3/2022 but reverted to afib 8/22.  eliquis (last dose at 1700); h/o TIA on ASA,  not a TPA candidate

## 2022-09-28 NOTE — ED PROVIDER NOTE - CLINICAL SUMMARY MEDICAL DECISION MAKING FREE TEXT BOX
53 y/o female BIB ambulance with  as a code stroke for right arm weakness, inability to speak or walk since 7:05pm. C/f hemorrhagic vs ischemic stroke will obtain CT head, CTA head/neck, neuro c/s - will admit vs transfer depending on CT findings.

## 2022-09-28 NOTE — PROGRESS NOTE ADULT - SUBJECTIVE AND OBJECTIVE BOX
SUMMARY:    HOSPITAL COURSE:    24 HOUR EVENTS:     ADMISSION SCORES:   GCS: HH: MF: NIHSS: ICH Score:    SEDATION SCORES:  RASS: CAM-ICU:     REVIEW OF SYSTEMS:    ALLERGIES: Allergies    IV Contrast (Urticaria)    Intolerances        VITALS/DATA/ORDERS: [] Reviewed    DEVICES:   [] Restraints [] PIVs [] ET tube [] central line [] PICC [] arterial line [] kolb [] NGT/OGT [] EVD [] LD [] ERIN/HMV [] LiCOX [] ICP monitor [] Trach [] PEG [] Chest Tube [] other:    EXAMINATION:  General: No acute distress  HEENT: Anicteric sclerae  Cardiac: [id number]  Lungs: Clear  Abdomen: Soft, non-tender, +BS  Extremities: No c/c/e  Skin/Incision Site: Clean, dry and intact  Neurologic: Awake, alert, fully oriented, follows commands, PERRL, VFFtc, EOMI, face symmetric, tongue midline, no drift, full strength SUMMARY: 53 y/o female with pmhx of a fib on Eliquis, history of rheumatic heart disease s/p MV repair, history of TIA, presented to [facility] for right sided weakness, global aphasia. LKN 7 PM on 9/28. Patient was not a candidate for IV tpa 2/2 to being on anticoagulation. Patient transferred here for mechanical thrombectomy with TICI 2c recannalization. NIHSS prior to MT 18 improved to 7 post thrombectomy.     HOSPITAL COURSE: As per [facility]    ADMISSION SCORES:   NIHSS: 18; Pre stroke MRS 0    REVIEW OF SYSTEMS:  REVIEW OF SYSTEMS: [] Unable to Assess due to neurologic exam   [ ] All ROS addressed below are non-contributory, except:  Neuro: [ ] Headache [ ] Back pain [ ] Numbness [ ] Weakness [ ] Ataxia [ ] Dizziness [ ] Aphasia [ ] Dysarthria [ ] Visual disturbance  Resp: [ ] Shortness of breath/dyspnea [ ] Orthopnea [ ] Cough  CV: [ ] Chest pain [ ] Palpitation [ ] Lightheadedness [ ] Syncope  Renal: [ ] Thirst [ ] Edema  GI: [ ] Nausea [ ] Emesis [ ] Abdominal pain [ ] Constipation [ ] Diarrhea  Hem: [ ] Hematemesis [ ] bBright red blood per rectum  ID: [ ] Fever [ ] Chills [ ] Dysuria  ENT: [ ] Rhinorrhea    ALLERGIES: Allergies    IV Contrast (Urticaria)    Intolerances      VITALS/DATA/ORDERS: [] Reviewed    DEVICES:   [] Restraints [x] PIVs [] ET tube [] central line [] PICC [x] arterial line [] kolb [] NGT/OGT [] EVD [] LD [] ERIN/HMV [] LiCOX [] ICP monitor [] Trach [] PEG [] Chest Tube [] other:    EXAMINATION:  General: No acute distress  HEENT: Anicteric sclerae  Cardiac: [id number]  Lungs: Clear  Abdomen: Soft, non-tender, +BS  Extremities: No c/c/e  Skin/Incision Site: Clean, dry and intact  Neurologic: Awake, alert, expressive aphasia with some difficulty with comprehension but largely able to follow simple  commands, left facial droop, left gaze deviation with inability to cross midline. RUE weak distally but able to lift off the plane of bed. RLE strong distally (limited with groin guard). L side full strength. Reports sensation deficit on the right.  SUMMARY: 53 y/o female with pmhx of a fib on Eliquis, history of rheumatic heart disease s/p MV repair, history of TIA, presented to [facility] for right sided weakness, global aphasia. LKN 7 PM on 9/28. Patient was not a candidate for IV tpa 2/2 to being on anticoagulation. Patient transferred here for mechanical thrombectomy with TICI 2c recannalization. NIHSS prior to MT 18 improved to 7 post thrombectomy.     HOSPITAL COURSE: As per [facility]    ADMISSION SCORES:   NIHSS: 18; Pre stroke MRS 0    REVIEW OF SYSTEMS:  REVIEW OF SYSTEMS: [] Unable to Assess due to neurologic exam   [x ] All ROS addressed below are non-contributory, except:  Neuro: [ ] Headache [ ] Back pain [x ] Numbness [x ] Weakness [ ] Ataxia [ ] Dizziness [x ] Aphasia [ ] Dysarthria [ x] Visual disturbance  Resp: [ ] Shortness of breath/dyspnea [ ] Orthopnea [ ] Cough  CV: [ ] Chest pain [ ] Palpitation [ ] Lightheadedness [ ] Syncope  Renal: [ ] Thirst [ ] Edema  GI: [ ] Nausea [ ] Emesis [ ] Abdominal pain [ ] Constipation [ ] Diarrhea  Hem: [ ] Hematemesis [ ] bBright red blood per rectum  ID: [ ] Fever [ ] Chills [ ] Dysuria  ENT: [ ] Rhinorrhea    ALLERGIES: Allergies    IV Contrast (Urticaria)    Intolerances      VITALS/DATA/ORDERS: [x] Reviewed    DEVICES:   [] Restraints [x] PIVs [] ET tube [] central line [] PICC [x] arterial line [] kolb [] NGT/OGT [] EVD [] LD [] ERIN/HMV [] LiCOX [] ICP monitor [] Trach [] PEG [] Chest Tube [] other:    EXAMINATION:  General: No acute distress  HEENT: Anicteric sclerae  Cardiac: [id number]  Lungs: Clear  Abdomen: Soft, non-tender, +BS  Extremities: No c/c/e  Skin/Incision Site: Clean, dry and intact  Neurologic: Awake, alert, expressive aphasia with some difficulty with comprehension but largely able to follow simple commands, left facial droop, left gaze deviation with inability to cross midline. RUE weak distally but able to lift off the plane of bed. RLE strong distally (limited with groin guard). L side full strength. Reports sensation deficit on the right.

## 2022-09-28 NOTE — ED PROVIDER NOTE - ATTENDING APP SHARED VISIT CONTRIBUTION OF CARE
I have evaluated the patient and agree with the documentation and assessment as made by the JOSE ANGEL. We have discussed plan of care and work up for the patient.   This was a shared visit with the JOSE ANGEL. I reviewed and verified the documentation and independently performed the documented:   History, Exam and Medical Decision Making.    52F, afib on a/c, mitral valve replacement who presents with R arm weakness and speech difficulty. Per , patient is normally ambulatory, verbal, and independent in ADLs. One hour prior to presentation, patient and  was about to have dinner when she had sudden onset RUE weakness, and inability to speak/walk. Took her A/C earlier this evening. No hx of head trauma. Unable to perform ROS at this time.  denies hx of seizures. Physical: atraumatic, L gaze preference, irregular rhythm, ctabl, abdomen soft. 0/5 strength in RUE, 1/5 strength in RLE, spontaneously moving LUE and LLE. Unable to follow commands. Unable to vocalize. Plan: stroke code.

## 2022-09-28 NOTE — CONSULT NOTE ADULT - ASSESSMENT
52y (15-Trent-1970) RH woman with a PMHx significant for RF s/p valve transplant and afib on eliquis (last dose at 1700) who presented to the ED with aphagia. Pt was sitting at the dinner table with her  when he noticed that she was unresponsive and not moving her right hand. Upon arrival to the ED pt had NIHSS of 18 with aphagia and Right hemiparesis. pt not a TPA candidate due to INR 1.97, PTT 39.3. CTA showed Left MCA occlusion in the M2 distribution Call was placed to endovascular at [facility] who accepted the patient for MT.     Impression:  Left MCA M2 occlusion with core infarct of 29 cc and penumbra of 1 cc    Recommendation:    Transfer to [facility] for MT     Case discussed with Tele stroke attending Dr. Hawley

## 2022-09-28 NOTE — ED ADULT NURSE REASSESSMENT NOTE - NS ED NURSE REASSESS COMMENT FT1
Mobile Critical Care RN:. 51 y/o female BIB ambulance with  as a code stroke for right arm weakness, inability to speak or walk since 7:05pm.  See NIH scale.  Pt with IV contrast allergy, Pt premedicated with 50mg benadryl and 125mg solumedrol.  No bleed on CT.  obstruction seen at M2.  unable to receive TPA due to AC use.  Pt to be transferred to [facility] for intervention.  Rpt to MARIIA Cronin.

## 2022-09-29 LAB
A1C WITH ESTIMATED AVERAGE GLUCOSE RESULT: 5.2 % — SIGNIFICANT CHANGE UP (ref 4–5.6)
ALBUMIN SERPL ELPH-MCNC: 3.8 G/DL — SIGNIFICANT CHANGE UP (ref 3.3–5)
ALP SERPL-CCNC: 84 U/L — SIGNIFICANT CHANGE UP (ref 40–120)
ALT FLD-CCNC: 16 U/L — SIGNIFICANT CHANGE UP (ref 10–45)
ANION GAP SERPL CALC-SCNC: 11 MMOL/L — SIGNIFICANT CHANGE UP (ref 5–17)
ANION GAP SERPL CALC-SCNC: 13 MMOL/L — SIGNIFICANT CHANGE UP (ref 5–17)
APTT BLD: 32.3 SEC — SIGNIFICANT CHANGE UP (ref 27.5–35.5)
AST SERPL-CCNC: 22 U/L — SIGNIFICANT CHANGE UP (ref 10–40)
BILIRUB DIRECT SERPL-MCNC: 0.2 MG/DL — SIGNIFICANT CHANGE UP (ref 0–0.3)
BILIRUB INDIRECT FLD-MCNC: 0.3 MG/DL — SIGNIFICANT CHANGE UP (ref 0.2–1)
BILIRUB SERPL-MCNC: 0.5 MG/DL — SIGNIFICANT CHANGE UP (ref 0.2–1.2)
BUN SERPL-MCNC: 11 MG/DL — SIGNIFICANT CHANGE UP (ref 7–23)
BUN SERPL-MCNC: 7 MG/DL — SIGNIFICANT CHANGE UP (ref 7–23)
CALCIUM SERPL-MCNC: 8.6 MG/DL — SIGNIFICANT CHANGE UP (ref 8.4–10.5)
CALCIUM SERPL-MCNC: 8.9 MG/DL — SIGNIFICANT CHANGE UP (ref 8.4–10.5)
CHLORIDE SERPL-SCNC: 106 MMOL/L — SIGNIFICANT CHANGE UP (ref 96–108)
CHLORIDE SERPL-SCNC: 108 MMOL/L — SIGNIFICANT CHANGE UP (ref 96–108)
CHOLEST SERPL-MCNC: 177 MG/DL — SIGNIFICANT CHANGE UP
CO2 SERPL-SCNC: 18 MMOL/L — LOW (ref 22–31)
CO2 SERPL-SCNC: 19 MMOL/L — LOW (ref 22–31)
CREAT SERPL-MCNC: 0.74 MG/DL — SIGNIFICANT CHANGE UP (ref 0.5–1.3)
CREAT SERPL-MCNC: 0.8 MG/DL — SIGNIFICANT CHANGE UP (ref 0.5–1.3)
EGFR: 88 ML/MIN/1.73M2 — SIGNIFICANT CHANGE UP
EGFR: 96 ML/MIN/1.73M2 — SIGNIFICANT CHANGE UP
ESTIMATED AVERAGE GLUCOSE: 103 MG/DL — SIGNIFICANT CHANGE UP (ref 68–114)
GLUCOSE SERPL-MCNC: 129 MG/DL — HIGH (ref 70–99)
GLUCOSE SERPL-MCNC: 152 MG/DL — HIGH (ref 70–99)
HCT VFR BLD CALC: 42.2 % — SIGNIFICANT CHANGE UP (ref 34.5–45)
HDLC SERPL-MCNC: 62 MG/DL — SIGNIFICANT CHANGE UP
HGB BLD-MCNC: 13.1 G/DL — SIGNIFICANT CHANGE UP (ref 11.5–15.5)
INR BLD: 1.49 RATIO — HIGH (ref 0.88–1.16)
LIPID PNL WITH DIRECT LDL SERPL: 103 MG/DL — HIGH
MAGNESIUM SERPL-MCNC: 1.8 MG/DL — SIGNIFICANT CHANGE UP (ref 1.6–2.6)
MAGNESIUM SERPL-MCNC: 2.2 MG/DL — SIGNIFICANT CHANGE UP (ref 1.6–2.6)
MCHC RBC-ENTMCNC: 24.7 PG — LOW (ref 27–34)
MCHC RBC-ENTMCNC: 31 GM/DL — LOW (ref 32–36)
MCV RBC AUTO: 79.5 FL — LOW (ref 80–100)
NON HDL CHOLESTEROL: 115 MG/DL — SIGNIFICANT CHANGE UP
NRBC # BLD: 0 /100 WBCS — SIGNIFICANT CHANGE UP (ref 0–0)
PHOSPHATE SERPL-MCNC: 3.3 MG/DL — SIGNIFICANT CHANGE UP (ref 2.5–4.5)
PHOSPHATE SERPL-MCNC: 4.1 MG/DL — SIGNIFICANT CHANGE UP (ref 2.5–4.5)
PLATELET # BLD AUTO: 169 K/UL — SIGNIFICANT CHANGE UP (ref 150–400)
POTASSIUM SERPL-MCNC: 4 MMOL/L — SIGNIFICANT CHANGE UP (ref 3.5–5.3)
POTASSIUM SERPL-MCNC: 4.2 MMOL/L — SIGNIFICANT CHANGE UP (ref 3.5–5.3)
POTASSIUM SERPL-SCNC: 4 MMOL/L — SIGNIFICANT CHANGE UP (ref 3.5–5.3)
POTASSIUM SERPL-SCNC: 4.2 MMOL/L — SIGNIFICANT CHANGE UP (ref 3.5–5.3)
PROT SERPL-MCNC: 7 G/DL — SIGNIFICANT CHANGE UP (ref 6–8.3)
PROTHROM AB SERPL-ACNC: 17.2 SEC — HIGH (ref 10.5–13.4)
RBC # BLD: 5.31 M/UL — HIGH (ref 3.8–5.2)
RBC # FLD: 14.3 % — SIGNIFICANT CHANGE UP (ref 10.3–14.5)
SODIUM SERPL-SCNC: 137 MMOL/L — SIGNIFICANT CHANGE UP (ref 135–145)
SODIUM SERPL-SCNC: 138 MMOL/L — SIGNIFICANT CHANGE UP (ref 135–145)
T3 SERPL-MCNC: 96 NG/DL — SIGNIFICANT CHANGE UP (ref 80–200)
T4 AB SER-ACNC: 8.1 UG/DL — SIGNIFICANT CHANGE UP (ref 4.6–12)
TRIGL SERPL-MCNC: 58 MG/DL — SIGNIFICANT CHANGE UP
TROPONIN T, HIGH SENSITIVITY RESULT: 12 NG/L — SIGNIFICANT CHANGE UP (ref 0–51)
TSH SERPL-MCNC: 1.71 UIU/ML — SIGNIFICANT CHANGE UP (ref 0.27–4.2)
WBC # BLD: 10.34 K/UL — SIGNIFICANT CHANGE UP (ref 3.8–10.5)
WBC # FLD AUTO: 10.34 K/UL — SIGNIFICANT CHANGE UP (ref 3.8–10.5)

## 2022-09-29 PROCEDURE — 99291 CRITICAL CARE FIRST HOUR: CPT

## 2022-09-29 PROCEDURE — 99255 IP/OBS CONSLTJ NEW/EST HI 80: CPT

## 2022-09-29 PROCEDURE — 93306 TTE W/DOPPLER COMPLETE: CPT | Mod: 26

## 2022-09-29 PROCEDURE — 70450 CT HEAD/BRAIN W/O DYE: CPT | Mod: 26

## 2022-09-29 PROCEDURE — 93970 EXTREMITY STUDY: CPT | Mod: 26

## 2022-09-29 PROCEDURE — 93010 ELECTROCARDIOGRAM REPORT: CPT

## 2022-09-29 RX ORDER — ATORVASTATIN CALCIUM 80 MG/1
20 TABLET, FILM COATED ORAL AT BEDTIME
Refills: 0 | Status: DISCONTINUED | OUTPATIENT
Start: 2022-09-29 | End: 2022-09-29

## 2022-09-29 RX ORDER — POLYETHYLENE GLYCOL 3350 17 G/17G
17 POWDER, FOR SOLUTION ORAL
Refills: 0 | Status: DISCONTINUED | OUTPATIENT
Start: 2022-09-29 | End: 2022-10-03

## 2022-09-29 RX ORDER — ATORVASTATIN CALCIUM 80 MG/1
80 TABLET, FILM COATED ORAL AT BEDTIME
Refills: 0 | Status: DISCONTINUED | OUTPATIENT
Start: 2022-09-29 | End: 2022-10-03

## 2022-09-29 RX ORDER — ENOXAPARIN SODIUM 100 MG/ML
40 INJECTION SUBCUTANEOUS
Refills: 0 | Status: DISCONTINUED | OUTPATIENT
Start: 2022-09-29 | End: 2022-09-29

## 2022-09-29 RX ORDER — MAGNESIUM SULFATE 500 MG/ML
1 VIAL (ML) INJECTION ONCE
Refills: 0 | Status: COMPLETED | OUTPATIENT
Start: 2022-09-29 | End: 2022-09-29

## 2022-09-29 RX ORDER — ASPIRIN/CALCIUM CARB/MAGNESIUM 324 MG
81 TABLET ORAL DAILY
Refills: 0 | Status: DISCONTINUED | OUTPATIENT
Start: 2022-09-29 | End: 2022-09-29

## 2022-09-29 RX ORDER — SENNA PLUS 8.6 MG/1
2 TABLET ORAL AT BEDTIME
Refills: 0 | Status: DISCONTINUED | OUTPATIENT
Start: 2022-09-29 | End: 2022-10-03

## 2022-09-29 RX ORDER — SODIUM CHLORIDE 9 MG/ML
1000 INJECTION INTRAMUSCULAR; INTRAVENOUS; SUBCUTANEOUS
Refills: 0 | Status: DISCONTINUED | OUTPATIENT
Start: 2022-09-29 | End: 2022-09-29

## 2022-09-29 RX ORDER — ACETAMINOPHEN 500 MG
1000 TABLET ORAL EVERY 6 HOURS
Refills: 0 | Status: DISCONTINUED | OUTPATIENT
Start: 2022-09-29 | End: 2022-10-03

## 2022-09-29 RX ORDER — SOTALOL HCL 120 MG
80 TABLET ORAL EVERY 12 HOURS
Refills: 0 | Status: DISCONTINUED | OUTPATIENT
Start: 2022-09-29 | End: 2022-10-03

## 2022-09-29 RX ORDER — CHLORHEXIDINE GLUCONATE 213 G/1000ML
1 SOLUTION TOPICAL DAILY
Refills: 0 | Status: DISCONTINUED | OUTPATIENT
Start: 2022-09-29 | End: 2022-10-03

## 2022-09-29 RX ORDER — NICARDIPINE HYDROCHLORIDE 30 MG/1
5 CAPSULE, EXTENDED RELEASE ORAL
Qty: 40 | Refills: 0 | Status: DISCONTINUED | OUTPATIENT
Start: 2022-09-29 | End: 2022-09-29

## 2022-09-29 RX ORDER — CHLORHEXIDINE GLUCONATE 213 G/1000ML
1 SOLUTION TOPICAL DAILY
Refills: 0 | Status: DISCONTINUED | OUTPATIENT
Start: 2022-09-29 | End: 2022-09-29

## 2022-09-29 RX ADMIN — NICARDIPINE HYDROCHLORIDE 25 MG/HR: 30 CAPSULE, EXTENDED RELEASE ORAL at 00:22

## 2022-09-29 RX ADMIN — Medication 100 GRAM(S): at 01:57

## 2022-09-29 RX ADMIN — CHLORHEXIDINE GLUCONATE 1 APPLICATION(S): 213 SOLUTION TOPICAL at 12:25

## 2022-09-29 RX ADMIN — POLYETHYLENE GLYCOL 3350 17 GRAM(S): 17 POWDER, FOR SOLUTION ORAL at 18:53

## 2022-09-29 RX ADMIN — SENNA PLUS 2 TABLET(S): 8.6 TABLET ORAL at 22:05

## 2022-09-29 RX ADMIN — ATORVASTATIN CALCIUM 80 MILLIGRAM(S): 80 TABLET, FILM COATED ORAL at 22:05

## 2022-09-29 RX ADMIN — SODIUM CHLORIDE 75 MILLILITER(S): 9 INJECTION INTRAMUSCULAR; INTRAVENOUS; SUBCUTANEOUS at 01:56

## 2022-09-29 NOTE — PROGRESS NOTE ADULT - SUBJECTIVE AND OBJECTIVE BOX
SUMMARY: 51 y/o female with pmhx of a fib on Eliquis, history of rheumatic heart disease s/p MV repair, history of TIA, presented to [facility] for right sided weakness, global aphasia. LKN 7 PM on 9/28. Patient was not a candidate for IV tpa 2/2 to being on anticoagulation. Patient transferred here for mechanical thrombectomy with TICI 2c recannalization. NIHSS prior to MT 18 improved to 7 post thrombectomy.     HOSPITAL COURSE: As per [facility]    ADMISSION SCORES:   NIHSS: 18; Pre stroke MRS 0    REVIEW OF SYSTEMS:  REVIEW OF SYSTEMS: [] Unable to Assess due to neurologic exam   [ ] All ROS addressed below are non-contributory, except:  Neuro: [ ] Headache [ ] Back pain [ ] Numbness [ ] Weakness [ ] Ataxia [ ] Dizziness [ ] Aphasia [ ] Dysarthria [ ] Visual disturbance  Resp: [ ] Shortness of breath/dyspnea [ ] Orthopnea [ ] Cough  CV: [ ] Chest pain [ ] Palpitation [ ] Lightheadedness [ ] Syncope  Renal: [ ] Thirst [ ] Edema  GI: [ ] Nausea [ ] Emesis [ ] Abdominal pain [ ] Constipation [ ] Diarrhea  Hem: [ ] Hematemesis [ ] bBright red blood per rectum  ID: [ ] Fever [ ] Chills [ ] Dysuria  ENT: [ ] Rhinorrhea    ALLERGIES: Allergies    IV Contrast (Urticaria)    Intolerances      VITALS/DATA/ORDERS: [] Reviewed    DEVICES:   [] Restraints [x] PIVs [] ET tube [] central line [] PICC [x] arterial line [] kolb [] NGT/OGT [] EVD [] LD [] ERIN/HMV [] LiCOX [] ICP monitor [] Trach [] PEG [] Chest Tube [] other:    EXAMINATION:  General: No acute distress  HEENT: Anicteric sclerae  Cardiac: [id number]  Lungs: Clear  Abdomen: Soft, non-tender, +BS  Extremities: No c/c/e  Skin/Incision Site: Clean, dry and intact  Neurologic: Awake, alert, expressive aphasia with some difficulty with comprehension but largely able to follow simple  commands, left facial droop, left gaze deviation with inability to cross midline. RUE weak distally but able to lift off the plane of bed. RLE strong distally (limited with groin guard). L side full strength. Reports sensation deficit on the right.  SUMMARY: 51 y/o female with pmhx of a fib on Eliquis, history of rheumatic heart disease s/p MV repair, history of TIA, presented to [facility] for right sided weakness, global aphasia. MARINEN 7 PM on 9/28. Patient was not a candidate for IV tpa 2/2 to being on anticoagulation. Patient transferred here for mechanical thrombectomy with TICI 2c recannalization. NIHSS prior to MT 18 improved to 7 post thrombectomy.     HOSPITAL COURSE: As per [facility]    ADMISSION SCORES:   NIHSS: 18; Pre stroke MRS 0    REVIEW OF SYSTEMS:  REVIEW OF SYSTEMS: [] Unable to Assess due to neurologic exam   [ ] All ROS addressed below are non-contributory, except:  Neuro: [ ] Headache [ ] Back pain [ ] Numbness [ ] Weakness [ ] Ataxia [ ] Dizziness [ ] Aphasia [ ] Dysarthria [ ] Visual disturbance  Resp: [ ] Shortness of breath/dyspnea [ ] Orthopnea [ ] Cough  CV: [ ] Chest pain [ ] Palpitation [ ] Lightheadedness [ ] Syncope  Renal: [ ] Thirst [ ] Edema  GI: [ ] Nausea [ ] Emesis [ ] Abdominal pain [ ] Constipation [ ] Diarrhea  Hem: [ ] Hematemesis [ ] bBright red blood per rectum  ID: [ ] Fever [ ] Chills [ ] Dysuria  ENT: [ ] Rhinorrhea    ALLERGIES: Allergies    IV Contrast (Urticaria)    Intolerances      VITALS/DATA/ORDERS: [] Reviewed    DEVICES:   [] Restraints [x] PIVs [] ET tube [] central line [] PICC [x] arterial line [] kolb [] NGT/OGT [] EVD [] LD [] ERIN/HMV [] LiCOX [] ICP monitor [] Trach [] PEG [] Chest Tube [] other:    EXAMINATION:  General: No acute distress  HEENT: Anicteric sclerae  Cardiac: [id number]  Lungs: Clear  Abdomen: Soft, non-tender, +BS  Extremities: No c/c/e  Skin/Incision Site: Clean, dry and intact  Neurologic: Awake, alert, expressive aphasia with some difficulty with comprehension but largely able to follow simple commands, right facial droop, EOMI, perrl RUE weak distally but able to lift off the plane of bed. RLE 5/5, L side 5/5, reports sensation deficit on the right.

## 2022-09-29 NOTE — CONSULT NOTE ADULT - SUBJECTIVE AND OBJECTIVE BOX
Patient is a 52y old  Female who presents with a chief complaint of Left MCA M2 segment occlusion (29 Sep 2022 07:57)      INTERVAL HPI/OVERNIGHT EVENTS:    Medications:MEDICATIONS  (STANDING):  atorvastatin 20 milliGRAM(s) Oral at bedtime  chlorhexidine 4% Liquid 1 Application(s) Topical daily  niCARdipine Infusion 5 mG/Hr (25 mL/Hr) IV Continuous <Continuous>  polyethylene glycol 3350 17 Gram(s) Oral two times a day  senna 2 Tablet(s) Oral at bedtime  sodium chloride 0.9%. 1000 milliLiter(s) (75 mL/Hr) IV Continuous <Continuous>    MEDICATIONS  (PRN):  acetaminophen     Tablet .. 1000 milliGRAM(s) Oral every 6 hours PRN Temp greater or equal to 38C (100.4F), Mild Pain (1 - 3)      Allergies: Allergies    IV Contrast (Urticaria)    Intolerances          FAMILY HISTORY:  Family history of acute myocardial infarction (Father)          PAST MEDICAL & SURGICAL HISTORY:  Rheumatic fever  as a teen, s/p mitral valve repair 1991      TIA (transient ischemic attack)  on Aspirin      Afib      S/P mitral valve repair  1991      H/O right knee surgery      S/P MVR (mitral valve replacement)  Re op MVR (T)          REVIEW OF SYSTEMS:  CONSTITUTIONAL: No fever, weight loss, or fatigue  EYES: No eye pain, visual disturbances, or discharge  ENMT:  No difficulty hearing, tinnitus, vertigo; No sinus or throat pain  NECK: No pain or stiffness  BREASTS: No pain, masses, or nipple discharge  RESPIRATORY: No cough, wheezing, chills or hemoptysis; No shortness of breath  CARDIOVASCULAR: No chest pain, palpitations, dizziness, or leg swelling  GASTROINTESTINAL: No abdominal or epigastric pain. No nausea, vomiting, or hematemesis; No diarrhea or constipation. No melena or hematochezia.  GENITOURINARY: No dysuria, frequency, hematuria, or incontinence  NEUROLOGICAL: No headaches, memory loss, loss of strength, numbness, or tremors  SKIN: No itching, burning, rashes, or lesions   LYMPH NODES: No enlarged glands  ENDOCRINE: No heat or cold intolerance; No hair loss  MUSCULOSKELETAL: No joint pain or swelling; No muscle, back, or extremity pain  PSYCHIATRIC: No depression, anxiety, mood swings, or difficulty sleeping  HEME/LYMPH: No easy bruising, or bleeding gums  ALLERY AND IMMUNOLOGIC: No hives or eczema    T(C): 36.9 (09-29-22 @ 03:00), Max: 37.1 (09-28-22 @ 20:55)  HR: 74 (09-29-22 @ 06:00) (64 - 120)  BP: 169/123 (09-28-22 @ 22:13) (151/110 - 187/121)  RR: 19 (09-29-22 @ 06:00) (15 - 24)  SpO2: 100% (09-29-22 @ 06:00) (95% - 100%)  Wt(kg): --Vital Signs Last 24 Hrs  T(C): 36.9 (29 Sep 2022 03:00), Max: 37.1 (28 Sep 2022 20:55)  T(F): 98.4 (29 Sep 2022 03:00), Max: 98.7 (28 Sep 2022 20:55)  HR: 74 (29 Sep 2022 06:00) (64 - 120)  BP: 169/123 (28 Sep 2022 22:13) (151/110 - 187/121)  BP(mean): 124 (28 Sep 2022 21:30) (113 - 143)  RR: 19 (29 Sep 2022 06:00) (15 - 24)  SpO2: 100% (29 Sep 2022 06:00) (95% - 100%)    Parameters below as of 28 Sep 2022 23:15  Patient On (Oxygen Delivery Method): room air      I&O's Summary    28 Sep 2022 07:01  -  29 Sep 2022 07:00  --------------------------------------------------------  IN: 645 mL / OUT: 2100 mL / NET: -1455 mL        PHYSICAL EXAM:  GENERAL: NAD, well-groomed, well-developed  HEAD:  Atraumatic, Normocephalic  EYES: EOMI, PERRLA, conjunctiva and sclera clear  ENMT: No tonsillar erythema, exudates, or enlargement; Moist mucous membranes, Good dentition, No lesions  NECK: Supple, No JVD, Normal thyroid  NERVOUS SYSTEM:  Alert & Oriented X3, Good concentration; Motor Strength 5/5 B/L upper and lower extremities; DTRs 2+ intact and symmetric  CHEST/LUNG: Clear to percussion bilaterally; No rales, rhonchi, wheezing, or rubs  HEART: Regular rate and rhythm; No murmurs, rubs, or gallops  ABDOMEN: Soft, Nontender, Nondistended; Bowel sounds present  EXTREMITIES:  2+ Peripheral Pulses, No clubbing, cyanosis, or edema  LYMPH: No lymphadenopathy noted  SKIN: No rashes or lesions    Consultant(s) Notes Reviewed:  [x ] YES  [ ] NO  Care Discussed with Consultants/Other Providerscpk [ x] YES  [ ] NO    LABS:                    CBC Full  -  ( 28 Sep 2022 23:53 )  WBC Count : 10.34 K/uL  RBC Count : 5.31 M/uL  Hemoglobin : 13.1 g/dL  Hematocrit : 42.2 %  Platelet Count - Automated : 169 K/uL  Mean Cell Volume : 79.5 fl  Mean Cell Hemoglobin : 24.7 pg  Mean Cell Hemoglobin Concentration : 31.0 gm/dL  Auto Neutrophil # : x  Auto Lymphocyte # : x  Auto Monocyte # : x  Auto Eosinophil # : x  Auto Basophil # : x  Auto Neutrophil % : x  Auto Lymphocyte % : x  Auto Monocyte % : x  Auto Eosinophil % : x  Auto Basophil % : x      09-28    137  |  106  |  7   ----------------------------<  152<H>  4.2   |  18<L>  |  0.74    Ca    8.6      28 Sep 2022 23:54  Phos  4.1     09-28  Mg     1.8     09-28    TPro  7.0  /  Alb  3.8  /  TBili  0.5  /  DBili  0.2  /  AST  22  /  ALT  16  /  AlkPhos  84  09-28          PT/INR - ( 28 Sep 2022 23:54 )   PT: 17.2 sec;   INR: 1.49 ratio         PTT - ( 28 Sep 2022 23:54 )  PTT:32.3 sec  RADIOLOGY & ADDITIONAL TESTS:    Imaging Personally Reviewed:  [ ] YES  [ ] NO 52F R-handed PMHx RF s/p valve transplant and Afib who initially presented to [facility] ED with R hand weakness and sudden onset of "not able to talk." Per family, pt was generally feeling unwell all day , when pt was sitting at the dinner table with her , he noticed that pt was unresponsive and not moving her right hand.   Pt was immediately taken to [facility] ED where code stroke was activated.   NIHSS of 18 for aphasia and Right hemiparesis. LKN 1907 9/28/22. mRS 0.   Pt was not a TPA candidate due to INR 1.97, PTT 39.3. CTA showed Left MCA occlusion in the M2 distribution prompting immediate transfer to [facility] for mechanical thrombectomy.     INTERVAL HPI/OVERNIGHT EVENTS:    Medications:MEDICATIONS  (STANDING):  atorvastatin 20 milliGRAM(s) Oral at bedtime  chlorhexidine 4% Liquid 1 Application(s) Topical daily  niCARdipine Infusion 5 mG/Hr (25 mL/Hr) IV Continuous <Continuous>  polyethylene glycol 3350 17 Gram(s) Oral two times a day  senna 2 Tablet(s) Oral at bedtime  sodium chloride 0.9%. 1000 milliLiter(s) (75 mL/Hr) IV Continuous <Continuous>    MEDICATIONS  (PRN):  acetaminophen     Tablet .. 1000 milliGRAM(s) Oral every 6 hours PRN Temp greater or equal to 38C (100.4F), Mild Pain (1 - 3)      Allergies: Allergies    IV Contrast (Urticaria)    Intolerances          FAMILY HISTORY:  Family history of acute myocardial infarction (Father)          PAST MEDICAL & SURGICAL HISTORY:  Rheumatic fever  as a teen, s/p mitral valve repair 1991      TIA (transient ischemic attack)  on Aspirin      Afib      S/P mitral valve repair  1991      H/O right knee surgery      S/P MVR (mitral valve replacement)  Re op MVR (T)          REVIEW OF SYSTEMS:    EYES: No eye pain, visual disturbances, or discharge  ENMT:  No difficulty hearing, tinnitus, vertigo; No sinus or throat pain    RESPIRATORY: No cough, wheezing, chills or hemoptysis; No shortness of breath  CARDIOVASCULAR: No chest pain, palpitations, dizziness, or leg swelling  GASTROINTESTINAL: No abdominal or epigastric pain. No nausea, vomiting, or hematemesis; No diarrhea or constipation. No melena or hematochezia.  GENITOURINARY: No dysuria, frequency, hematuria, or incontinence  NEUROLOGICAL: as above    T(C): 36.9 (09-29-22 @ 03:00), Max: 37.1 (09-28-22 @ 20:55)  HR: 74 (09-29-22 @ 06:00) (64 - 120)  BP: 169/123 (09-28-22 @ 22:13) (151/110 - 187/121)  RR: 19 (09-29-22 @ 06:00) (15 - 24)  SpO2: 100% (09-29-22 @ 06:00) (95% - 100%)  Wt(kg): --Vital Signs Last 24 Hrs  T(C): 36.9 (29 Sep 2022 03:00), Max: 37.1 (28 Sep 2022 20:55)  T(F): 98.4 (29 Sep 2022 03:00), Max: 98.7 (28 Sep 2022 20:55)  HR: 74 (29 Sep 2022 06:00) (64 - 120)  BP: 169/123 (28 Sep 2022 22:13) (151/110 - 187/121)  BP(mean): 124 (28 Sep 2022 21:30) (113 - 143)  RR: 19 (29 Sep 2022 06:00) (15 - 24)  SpO2: 100% (29 Sep 2022 06:00) (95% - 100%)    Parameters below as of 28 Sep 2022 23:15  Patient On (Oxygen Delivery Method): room air      I&O's Summary    28 Sep 2022 07:01  -  29 Sep 2022 07:00  --------------------------------------------------------  IN: 645 mL / OUT: 2100 mL / NET: -1455 mL        PHYSICAL EXAM:  GENERAL: NAD, w  HEAD:  Atraumatic, Normocephalic  EYES: EOMI, PERRLA, conjunctiva and sclera clear  ENMT: No tonsillar erythema, exudates, or enlargement; Moist mucous membranes,   NECK: Supple, No JVD, Normal thyroid  NERVOUS SYSTEM:  r hemiparesis / aphasic  facial droop     CHEST/LUNG: Clear to percussion bilaterally; No rales, rhonchi, wheezing, or rubs  HEART: Regular rate and rhythm; No murmurs, rubs, or gallops  ABDOMEN: Soft, Nontender, Nondistended; Bowel sounds present  EXTREMITIES:  2+ Peripheral Pulses, No clubbing, cyanosis, or edema  LYMPH: No lymphadenopathy noted      Consultant(s) Notes Reviewed:  [x ] YES  [ ] NO  Care Discussed with Consultants/Other Providerscpk [ x] YES  [ ] NO    LABS:                    CBC Full  -  ( 28 Sep 2022 23:53 )  WBC Count : 10.34 K/uL  RBC Count : 5.31 M/uL  Hemoglobin : 13.1 g/dL  Hematocrit : 42.2 %  Platelet Count - Automated : 169 K/uL  Mean Cell Volume : 79.5 fl  Mean Cell Hemoglobin : 24.7 pg  Mean Cell Hemoglobin Concentration : 31.0 gm/dL  Auto Neutrophil # : x  Auto Lymphocyte # : x  Auto Monocyte # : x  Auto Eosinophil # : x  Auto Basophil # : x  Auto Neutrophil % : x  Auto Lymphocyte % : x  Auto Monocyte % : x  Auto Eosinophil % : x  Auto Basophil % : x      09-28    137  |  106  |  7   ----------------------------<  152<H>  4.2   |  18<L>  |  0.74    Ca    8.6      28 Sep 2022 23:54  Phos  4.1     09-28  Mg     1.8     09-28    TPro  7.0  /  Alb  3.8  /  TBili  0.5  /  DBili  0.2  /  AST  22  /  ALT  16  /  AlkPhos  84  09-28          PT/INR - ( 28 Sep 2022 23:54 )   PT: 17.2 sec;   INR: 1.49 ratio         PTT - ( 28 Sep 2022 23:54 )  PTT:32.3 sec  RADIOLOGY & ADDITIONAL TESTS:    Imaging Personally Reviewed:  [ ] YES  [ ] NO

## 2022-09-29 NOTE — OCCUPATIONAL THERAPY INITIAL EVALUATION ADULT - PERTINENT HX OF CURRENT PROBLEM, REHAB EVAL
52F R-handed PMHx RF s/p valve transplant and Afib (on Eliquis; last dose at 1700) who initially presented to [facility] ED with R hand weakness and sudden onset of "not able to talk." Per family, pt was generally feeling unwell all day and tonight, when pt was sitting at the dinner table with her , he noticed that pt was unresponsive and not moving her right hand. Pt was immediately taken to [facility] ED where code stroke was activated. NIHSS of 18 for aphasia and Right hemiparesis. LKN 1907 9/28/22. mRS 0. Pt was not a TPA candidate due to INR 1.97, PTT 39.3. CTA showed Left MCA occlusion in the M2 distribution prompting immediate transfer to [facility] for mechanical thrombectomy. R hemiparesis, hemineglect, and aphasia likely 2/2 Left brain dysfunction due to Left MCA M2 segment occlusion. Mechanism likely cardio-embolic i/s/o known Afib.

## 2022-09-29 NOTE — CHART NOTE - NSCHARTNOTEFT_GEN_A_CORE
Patient seen and examined at bedside with attending and neurology team. Please refer to attending attestation of neurology consult note from the day prior for final recommendations.

## 2022-09-29 NOTE — CHART NOTE - NSCHARTNOTESELECT_GEN_ALL_CORE
Interventional Neuro Radiology/Event Note
Interventional Neuro Radiology/Event Note
VTE Risk Assessment

## 2022-09-29 NOTE — CONSULT NOTE ADULT - ASSESSMENT
Acute CVA  likely embolic   pt was on a/c for PAF   she is compliant and her coags were elevated on admission   ? NOAC failure   fu with neurology   may need to switch to lovenox or coumadin   obtain echo with bubble study   obtain lower ext venous doppler   would get Heme eval for hypercoag work up     PAF   in sinus   resume sotalol   monitor qtc   a/c as above     s/p MVR  obtain echo     HTN  permissive HTN as per neurology 
52F R-handed PMHx RF s/p valve transplant and Afib  who initially presented to [facility] ED with R hand weakness and sudden onset of "not able to talk." Code stroke activated at [facility] ED. NIHSS 18. LKN 1907 9/28/22. mRS 0.   Pt was not a tPA candidate due to elevated INR but a thrombectomy candidate due to LVO seen on CTA H/N.   Pt transferred to [facility].   Pt now s/p thrombectomy.     R hemiparesis, hemineglect, and aphasia likely 2/2 Left brain dysfunction due to Left MCA M2 segment occlusion. Mechanism likely cardio-embolic i/s/o known Afib.      Eliquis 5mg BID   Atorvastatin 80mg QD  [] MRI brain w/o contrast  [] MRA H/N   [] TTE with bubble study     [] Telemonitoring; Neurochecks and Vital signs per unit protocol    [] PT/OT evaluation  [] NPO until clears Dysphagia screen  Swallow evaluation

## 2022-09-29 NOTE — H&P ADULT - ATTENDING COMMENTS
Agree with assessment and plan as detailed above   Underwent successful recanalization of left M2 still with significant aphasia motor exam improve.  Etiology cardioembolic.

## 2022-09-29 NOTE — CHART NOTE - NSCHARTNOTEFT_GEN_A_CORE
CAPRINI SCORE [CLOT] Score on Admission for     AGE RELATED RISK FACTORS                                                       MOBILITY RELATED FACTORS  [x ] Age 41-60 years                                            (1 Point)                  [ ] Bed rest                                                        (1 Point)  [ ] Age: 61-74 years                                           (2 Points)                 [ ] Plaster cast                                                   (2 Points)  [ ] Age= 75 years                                              (3 Points)                 [ ] Bed bound for more than 72 hours                 (2 Points)    DISEASE RELATED RISK FACTORS                                               GENDER SPECIFIC FACTORS  [ ] Edema in the lower extremities                       (1 Point)                  [ ] Pregnancy                                                     (1 Point)  [ ] Varicose veins                                               (1 Point)                  [ ] Post-partum < 6 weeks                                   (1 Point)             [ ] BMI > 25 Kg/m2                                            (1 Point)                  [ ] Hormonal therapy  or oral contraception          (1 Point)                 [ ] Sepsis (in the previous month)                        (1 Point)                  [ ] History of pregnancy complications                 (1 point)  [ ] Pneumonia or serious lung disease                                               [ ] Unexplained or recurrent                     (1 Point)           (in the previous month)                               (1 Point)  [ ] Abnormal pulmonary function test                     (1 Point)                 SURGERY RELATED RISK FACTORS (include planned surgeries)  [ ] Acute myocardial infarction                              (1 Point)                 [ ]  Section                                             (1 Point)  [ ] Congestive heart failure (in the previous month)  (1 Point)         [ ] Minor surgery                                                  (1 Point)   [ ] Inflammatory bowel disease                             (1 Point)                 [ ] Arthroscopic surgery                                        (2 Points)  [ ] Central venous access                                      (2 Points)                [ ] General surgery lasting more than 45 minutes   (2 Points)       [x ] Stroke (in the previous month)                          (5 Points)               [ ] Elective arthroplasty                                         (5 Points)            [ ] current or past malignancy                              (2 Points)                                                                                                       HEMATOLOGY RELATED FACTORS                                                 TRAUMA RELATED RISK FACTORS  [ ] Prior episodes of VTE                                     (3 Points)                [ ] Fracture of the hip, pelvis, or leg                       (5 Points)  [ ] Positive family history for VTE                         (3 Points)                 [ ] Acute spinal cord injury (in the previous month)  (5 Points)  [ ] Prothrombin 42647 A                                     (3 Points)                 [ ] Paralysis  (less than 1 month)                             (5 Points)  [ ] Factor V Leiden                                             (3 Points)                  [ ] Multiple Trauma within 1 month                        (5 Points)  [ ] Lupus anticoagulants                                     (3 Points)                                                           [ ] Anticardiolipin antibodies                               (3 Points)                                                       [ ] High homocysteine in the blood                      (3 Points)                                             [ ] Other congenital or acquired thrombophilia      (3 Points)                                                [ ] Heparin induced thrombocytopenia                  (3 Points)                                          Total Score [   6       ]    Risk:  Very low 0   Low 1 to 2   Moderate 3 to 4   High =5       VTE Prophylasix Recommednations:  [x ] mechanical pneumatic compression devices                                      [ ] contraindicated: _____________________  [ ] chemo prophylasix                                                                                   [x ] contraindicated _____________________    **** HIGH LIKELIHOOD DVT PRESENT ON ADMISSION  [x] (please order LE dopplers within 24 hours of admission)

## 2022-09-29 NOTE — H&P ADULT - NSHPPHYSICALEXAM_GEN_ALL_CORE
Physical Examination:    General - comfortable female in no acute distress  Cardiovascular - Peripheral pulses palpable, no edema  Eyes - Fundoscopy not performed due to safety precautions in the setting of the COVID-19 pandemic    Neurologic Exam:  Mental status - Awake, Alert, and not oriented to person, place, or time. Garbled speech (appears to acknowledge question but difficulty in answer questions) and intermittently following simple commands (like squeezing hands and closing eyes tight).     Cranial nerves - Left pupil 4mm right pupil 2-3mm equally reactive, with right hemineglect, does cross midline, unable to protrude tongue     Motor - normal bulk and tone throughout.   RUE 0/5, LUE 5/5, RLE 2/5, LUE 5/5    Sensation - R hemineglect     DTR's -             Biceps      Triceps     Brachioradialis      Patellar    Ankle    Toes/plantar response  R             1+             1+             1+                       1+           1+                 mute   L              1+             1+             1+                        1+           1+                 mute    Coordination -  unable to assess due to change in mental status     Gait and station -   unable to assess due to change in mental status

## 2022-09-29 NOTE — H&P ADULT - NSHPLABSRESULTS_GEN_ALL_CORE
LABS:                        13.1   10.34 )-----------( 169      ( 28 Sep 2022 23:53 )             42.2     09-28    137  |  106  |  7   ----------------------------<  152<H>  4.2   |  18<L>  |  0.74    Ca    8.6      28 Sep 2022 23:54  Phos  4.1     09-28  Mg     1.8     09-28    TPro  7.0  /  Alb  3.8  /  TBili  0.5  /  DBili  0.2  /  AST  22  /  ALT  16  /  AlkPhos  84  09-28    PT/INR - ( 28 Sep 2022 23:54 )   PT: 17.2 sec;   INR: 1.49 ratio      PTT - ( 28 Sep 2022 23:54 )  PTT:32.3 sec    < from: CT Brain Perfusion Maps Stroke (09.28.22 @ 20:23) >    NECK  RIGHT CAROTID CIRCULATION:  Evaluation of the right carotid circulation demonstrates no hemodynamic   significant narrowing utilizing a distal reference.    LEFT CAROTID CIRCULATION:  Evaluation of the left carotid circulation demonstratesno hemodynamic   significant narrowing utilizing a distal reference. Question of a small   web within the left carotid bulb    VERTEBRAL ARTERIES:  Evaluation of the vertebral arteries reveals no evidence of a vertebral   artery occlusion or dissection.    BRAIN  ANTERIOR CIRCULATION:  Distal internal carotid arteries are patent.  Anterior cerebral arteries are patent.  Right middle cerebral artery is patent.  There is an anterior division left MCA M2 branch occlusion.    POSTERIOR CIRCULATION:  Distal vertebral arteries are patent.  Basilar artery is patent.  Posterior cerebral arteries are patent.    -------------------------------------------------------------  CT PERFUSION TECHNIQUE:  CT perfusion was performed during the administration of intravenous   contrast.  There was a total of 8 cm brain coverage.  The images were processed utilizing RAPID software.    Ischemic tissue is defined as TMAX > 6 seconds.  Core infarct is defined as CBF < 30%.    CT PERFUSION FINDINGS:  There a moderate region of decreased perfusion in the left frontal   parietal region.  The volume of ischemic tissue (core infarct and penumbra) measures 30 mL.  The core infarct measures 29 mL.  The mismatch volume (penumbra) measure 1 mL.  The mismatch ratio (ischemic tissue / core) is: 1.0  The hypoperfusion index (Tmax>10s/Tmax>6s) is: 0.6    IMPRESSION:  *  NO HEMODYNAMIC SIGNIFICANT NARROWING WITHIN THE NECK. QUESTION OF A   SMALL WEB WITHIN THE LEFT CAROTID BULB  *  THERE IS AN ANTERIOR DIVISION LEFT MCA M2 BRANCH OCCLUSION.  *  MODERATE PERFUSION DEFECT LEFT FRONTAL PARIETAL REGION WITH A CORE   INFARCT OF 29 ML AND PENUMBRA OF 1 ML    < end of copied text >

## 2022-09-29 NOTE — H&P ADULT - ASSESSMENT
52F R-handed PMHx RF s/p valve transplant and Afib (on Eliquis; last dose at 1700) who initially presented to [facility] ED with R hand weakness and sudden onset of "not able to talk." Code stroke activated at [facility] ED. NIHSS 18. LKN 1907 9/28/22. mRS 0. Pt was not a tPA candidate due to elevated INR but a thrombectomy candidate due to LVO seen on CTA H/N. Pt transferred to [facility]. Pt now s/p thrombectomy. On re-exam, pt NIHSS 14. Vitals stable.    IMPRESSION: R hemiparesis, hemineglect, and aphasia likely 2/2 Left brain dysfunction due to Left MCA M2 segment occlusion. Mechanism likely cardio-embolic i/s/o known Afib.     PLAN:  [] Continue home Eliquis 5mg BID  [] Start Atorvastatin 80mg QD  [] MRI brain w/o contrast to look at the extent and distribution of the stroke   [] MRA H/N to look at the cerebral vasculature.   [] TTE with bubble study and telemetry to look for a cardiac source of embolism.  [] +/- ILR depending on TTE  [] HbA1C and Lipid Panel  [] Telemonitoring; Neurochecks and Vital signs per unit protocol  [] BP management per NSCU team  [] PT/OT evaluation  [] NPO until clears Dysphagia screen, otherwise Swallow evaluation  [] Stroke education provided      The management and treatment decisions which include alteplase and mechanical thrombectomy were discussed with stroke fellow, Dr. Campo, under the supervision of neurovascular attending, Dr. Wooten.

## 2022-09-29 NOTE — PHYSICAL THERAPY INITIAL EVALUATION ADULT - PERTINENT HX OF CURRENT PROBLEM, REHAB EVAL
52F R-handed PMHx RF s/p valve transplant and Afib (on Eliquis; last dose at 1700) who initially presented to [facility] ED with R hand weakness and sudden onset of "not able to talk." Code stroke activated at [facility] ED. NIHSS 18. LKN 1907 9/28/22

## 2022-09-29 NOTE — H&P ADULT - HISTORY OF PRESENT ILLNESS
52F R-handed PMHx RF s/p valve transplant and Afib (on Eliquis; last dose at 1700) who initially presented to [facility] ED with R hand weakness and sudden onset of "not able to talk." Per family, pt was generally feeling unwell all day and tonight, when pt was sitting at the dinner table with her , he noticed that pt was unresponsive and not moving her right hand. Pt was immediately taken to [facility] ED where code stroke was activated. NIHSS of 18 for aphasia and Right hemiparesis. LKN 1907 9/28/22. mRS 0. Pt was not a TPA candidate due to INR 1.97, PTT 39.3. CTA showed Left MCA occlusion in the M2 distribution prompting immediate transfer to [facility] for mechanical thrombectomy.

## 2022-09-29 NOTE — PROGRESS NOTE ADULT - ASSESSMENT
ASSESSMENT/PLAN: 54 y/o female with L MCA s/p mechanical thrombectomy with TICI 2C recannilization     NEURO:  Neurochecks Q1H  CTH post thrombectomy stable  CTH in the AM - will hold ASA and Lovenox (dvt ppx) until CTH   Stroke code measures  Will start patient on Atorvastatin 40 mg QD   Activity: [] mobilize as tolerated [x] Bedrest [] PT [] OT [] PMNR    PULM:  Breathing on Room air    CV:  SBP goal 100-140 mmHg  On Sotalol at home- family will confirm whether its a current med.   History of MV repair, history of rheumatic heart disease  Will consult cardiology    RENAL:  Fluids: IVF at 75 cc/hour    GI:  Diet: NPO  GI prophylaxis [x] not indicated [] PPI [] other:  Bowel regimen [x] Miralax [x] senna [] other:    ENDO:   Will obtain A1c   Goal euglycemia (-180)    HEME/ONC:  VTE prophylaxis: [x] SCDs [] chemoprophylaxis [] hold chemoprophylaxis until stability CTH     ID:  Afebrile, will obtain CBC     MISC:    SOCIAL/FAMILY:  [] awaiting [x] updated at bedside [] family meeting    CODE STATUS:  [x] Full Code [] DNR [] DNI [] Palliative/Comfort Care    DISPOSITION:  [x] ICU [] Stroke Unit [] Floor [] EMU [] RCU [] PCU   ASSESSMENT/PLAN: 56 y/o female with L MCA s/p mechanical thrombectomy with TICI 2C recannilization     NEURO:  Neurochecks Q1H  CTH post thrombectomy stable  CTH in the AM  start ASA and Lovenox   Stroke core measures  Activity: [] mobilize as tolerated [x] Bedrest [] PT [] OT [] PMNR    PULM:  spo2>92% on Room air    CV:  SBP goal 100-140 mmHg  On Sotalol at home- family will confirm whether its a current med.   History of MV repair, history of rheumatic heart disease  Will consult cardiology  lipitor 80 qd (ldl>100)    RENAL:  Fluids: IVL    GI:  Diet: reg diet  GI prophylaxis [x] not indicated [] PPI [] other:  Bowel regimen [x] Miralax [x] senna [] other:    ENDO:   Will obtain A1c 5.2  Goal euglycemia (-180)    HEME/ONC:  VTE prophylaxis: [x] SCDs [] chemoprophylaxis [] hold chemoprophylaxis until stability CTH   lovenox dvt ppx    ID:  Afebrile, will obtain CBC     MISC:    SOCIAL/FAMILY:  [] awaiting [x] updated at bedside [] family meeting    CODE STATUS:  [x] Full Code [] DNR [] DNI [] Palliative/Comfort Care    DISPOSITION:  [x] ICU [] Stroke Unit [] Floor [] EMU [] RCU [] PCU   ASSESSMENT/PLAN: 54 y/o female with L MCA s/p mechanical thrombectomy with TICI 2C recannilization     NEURO:  Neurochecks Q1H  CTH post thrombectomy stable  CTH in the AM  hold ASA and Lovenox per cth - f/u tmw scan   Stroke core measures  Activity: [] mobilize as tolerated [x] Bedrest [] PT [] OT [] PMNR    PULM:  spo2>92% on Room air    CV:  SBP goal 100-140 mmHg  On Sotalol at home- family will confirm whether its a current med.   History of MV repair, history of rheumatic heart disease  Will consult cardiology  lipitor 80 qd (ldl>100)    RENAL:  Fluids: IVL    GI:  Diet: reg diet  GI prophylaxis [x] not indicated [] PPI [] other:  Bowel regimen [x] Miralax [x] senna [] other:    ENDO:   Will obtain A1c 5.2  Goal euglycemia (-180)    HEME/ONC:  VTE prophylaxis: [x] SCDs [] chemoprophylaxis [] hold chemoprophylaxis until stability CTH   lovenox dvt ppx    ID:  Afebrile, will obtain CBC     MISC:    SOCIAL/FAMILY:  [] awaiting [x] updated at bedside [] family meeting    CODE STATUS:  [x] Full Code [] DNR [] DNI [] Palliative/Comfort Care    DISPOSITION:  [x] ICU [] Stroke Unit [] Floor [] EMU [] RCU [] PCU

## 2022-09-29 NOTE — PHYSICAL THERAPY INITIAL EVALUATION ADULT - ACTIVE RANGE OF MOTION EXAMINATION, REHAB EVAL
Left LE Active ROM was WFL (within functional limits)/bilateral  lower extremity Active ROM was WFL (within functional limits)

## 2022-09-29 NOTE — PROGRESS NOTE ADULT - ASSESSMENT
ASSESSMENT/PLAN: 54 y/o female with L MCA s/p mechanical thrombectomy with TICI 2C recannilization     NEURO:  Neurochecks Q1H  CTH post thrombectomy stable  CTH in the AM  hold ASA and Lovenox per cth - f/u tmw scan   Stroke core measures  Activity: [] mobilize as tolerated [x] Bedrest [] PT [] OT [] PMNR    PULM:  spo2>92% on Room air    CV:  SBP goal 100-140 mmHg  On Sotalol at home- family will confirm whether its a current med.   History of MV repair, history of rheumatic heart disease  Will consult cardiology  lipitor 80 qd (ldl>100)    RENAL:  Fluids: IVL    GI:  Diet: reg diet  GI prophylaxis [x] not indicated [] PPI [] other:  Bowel regimen [x] Miralax [x] senna [] other:    ENDO:   Will obtain A1c 5.2  Goal euglycemia (-180)    HEME/ONC:  VTE prophylaxis: [x] SCDs [] chemoprophylaxis [] hold chemoprophylaxis until stability CTH   lovenox dvt ppx    ID:  Afebrile, will obtain CBC     MISC:    SOCIAL/FAMILY:  [] awaiting [x] updated at bedside [] family meeting    CODE STATUS:  [x] Full Code [] DNR [] DNI [] Palliative/Comfort Care    DISPOSITION:  [x] ICU [] Stroke Unit [] Floor [] EMU [] RCU [] PCU   ASSESSMENT/PLAN: 54 y/o female with L MCA s/p mechanical thrombectomy with TICI 2C recannilization.     NEURO:  Neurochecks Q1H  CTH post thrombectomy stable  CTH- possible heme vs. contrast extravasation  hold ASA and Lovenox per cth - f/u tmw scan   Stroke core measures  Activity: [] mobilize as tolerated [x] Bedrest [] PT [] OT [] PMNR    PULM:  spo2>92% on Room air    CV:  SBP goal 100-160 mmHg  History of MV repair, history of rheumatic heart disease  Cardiology consulted- recommend hypercoagulable work up, echo with bubble study  lipitor 80 qd (ldl>100)  will start Sotalol (home med)    RENAL:  Fluids: IVL    GI:  Diet: reg diet  GI prophylaxis [x] not indicated [] PPI [] other:  Bowel regimen [x] Miralax [x] senna [] other:    ENDO:   A1c 5.2  Goal euglycemia (-180)    HEME/ONC:  VTE prophylaxis: [x] SCDs [] chemoprophylaxis [] hold chemoprophylaxis until stability CTH tomorrow AM    ID:  Afebrile, no leukocytosis  Lower extremity doppler negative.     MISC:    SOCIAL/FAMILY:  [] awaiting [x] updated at bedside [] family meeting    CODE STATUS:  [x] Full Code [] DNR [] DNI [] Palliative/Comfort Care    DISPOSITION:  [x] ICU [] Stroke Unit [] Floor [] EMU [] RCU [] PCU

## 2022-09-29 NOTE — CONSULT NOTE ADULT - SUBJECTIVE AND OBJECTIVE BOX
CHIEF COMPLAINT:Patient is a 52y old  Female who presents with a chief complaint of Left MCA M2 segment occlusion (29 Sep 2022 02:38)      HISTORY OF PRESENT ILLNESS:    52 female with history of PAF on a/c , rheumatic heart disease s/p MVR bio, TIA, admitted s/p acute CVA s/p mechanical thrombectomy   pt is aphasic  denies any chest pain, sob, palpitation, dizziness or syncope.     PAST MEDICAL & SURGICAL HISTORY:  Rheumatic fever  as a teen, s/p mitral valve repair 1991      TIA (transient ischemic attack)  on Aspirin      Afib      S/P mitral valve repair  1991      H/O right knee surgery      S/P MVR (mitral valve replacement)  Re op MVR (T)              MEDICATIONS:  niCARdipine Infusion 5 mG/Hr IV Continuous <Continuous>        acetaminophen     Tablet .. 1000 milliGRAM(s) Oral every 6 hours PRN    polyethylene glycol 3350 17 Gram(s) Oral two times a day  senna 2 Tablet(s) Oral at bedtime    atorvastatin 20 milliGRAM(s) Oral at bedtime    chlorhexidine 4% Liquid 1 Application(s) Topical daily  sodium chloride 0.9%. 1000 milliLiter(s) IV Continuous <Continuous>      FAMILY HISTORY:  Family history of acute myocardial infarction (Father)        Non-contributory    SOCIAL HISTORY:    No tobacco, drugs or etoh    Allergies    IV Contrast (Urticaria)    Intolerances    	    REVIEW OF SYSTEMS:  as above  The rest of the 14 points ROS reviewed and except above they are unremarkable.        PHYSICAL EXAM:  T(C): 36.9 (09-29-22 @ 03:00), Max: 37.1 (09-28-22 @ 20:55)  HR: 74 (09-29-22 @ 06:00) (64 - 120)  BP: 169/123 (09-28-22 @ 22:13) (151/110 - 187/121)  RR: 19 (09-29-22 @ 06:00) (15 - 24)  SpO2: 100% (09-29-22 @ 06:00) (95% - 100%)  Wt(kg): --  I&O's Summary    28 Sep 2022 07:01  -  29 Sep 2022 07:00  --------------------------------------------------------  IN: 645 mL / OUT: 2100 mL / NET: -1455 mL      JVP: Normal  Neck: supple  Lung: clear   CV: S1 S2 , Murmur:  Abd: soft  Ext: No edema  neuro: Awake / alert  Psych: flat affect  Skin: normal      LABS/DATA:    TELEMETRY: 	  sinus   ECG:  	   	  CARDIAC MARKERS:    < from: Transesophageal Echocardiogram (05.12.21 @ 10:25) >  Conclusions:  1. Bioprosthetic mitral valve replacement.  Normal leaflet  motion.  2. Normal left atrium.  LA volume index = 33 cc/m2.  Spontaneous echo contrast seen.   No left atrial or left  atrial appendage thrombus.   Decreased left atrial  appendage velocities noted.  3. Normal left ventricular internal dimensions and wall  thicknesses.  4. Endocardium not well visualized; grossly normal left  ventricular systolic function. Septal motion consistent  with cardiac surgery.  Performed in the setting of atrial flutter with rapid  ventricular response.  ------------------------------------------------------------------------  Confirmed on  5/12/2021 - 15:41:41 by ANNELISE Lantigua  ------------------------------------------------------------------------    < end of copied text >                      12 <<== 09-29-22 @ 06:44                25 <<== 09-28-22 @ 19:00                              13.1   10.34 )-----------( 169      ( 28 Sep 2022 23:53 )             42.2     09-28    137  |  106  |  7   ----------------------------<  152<H>  4.2   |  18<L>  |  0.74    Ca    8.6      28 Sep 2022 23:54  Phos  4.1     09-28  Mg     1.8     09-28    TPro  7.0  /  Alb  3.8  /  TBili  0.5  /  DBili  0.2  /  AST  22  /  ALT  16  /  AlkPhos  84  09-28    proBNP:   Lipid Profile:   HgA1c:   TSH: Thyroid Stimulating Hormone, Serum: 1.71 uIU/mL (09-28 @ 23:55)

## 2022-09-29 NOTE — PHYSICAL THERAPY INITIAL EVALUATION ADULT - ADDITIONAL COMMENTS
as per pt and  at b/s, resides in a  with spouse and family, 4 stairs to enter, 1 flight indoor, PTA, pt amb (I), (I) with ADLs.

## 2022-09-29 NOTE — PROGRESS NOTE ADULT - SUBJECTIVE AND OBJECTIVE BOX
SUMMARY: 53 y/o female with pmhx of a fib on Eliquis, history of rheumatic heart disease s/p MV repair, history of TIA, presented to [facility] for right sided weakness, global aphasia. MARINEN 7 PM on 9/28. Patient was not a candidate for IV tpa 2/2 to being on anticoagulation. Patient transferred here for mechanical thrombectomy with TICI 2c recannalization. NIHSS prior to MT 18 improved to 7 post thrombectomy.     HOSPITAL COURSE: As per [facility]    ADMISSION SCORES:   NIHSS: 18; Pre stroke MRS 0    REVIEW OF SYSTEMS:  REVIEW OF SYSTEMS: [] Unable to Assess due to neurologic exam   [ ] All ROS addressed below are non-contributory, except:  Neuro: [ ] Headache [ ] Back pain [ ] Numbness [ ] Weakness [ ] Ataxia [ ] Dizziness [ ] Aphasia [ ] Dysarthria [ ] Visual disturbance  Resp: [ ] Shortness of breath/dyspnea [ ] Orthopnea [ ] Cough  CV: [ ] Chest pain [ ] Palpitation [ ] Lightheadedness [ ] Syncope  Renal: [ ] Thirst [ ] Edema  GI: [ ] Nausea [ ] Emesis [ ] Abdominal pain [ ] Constipation [ ] Diarrhea  Hem: [ ] Hematemesis [ ] bBright red blood per rectum  ID: [ ] Fever [ ] Chills [ ] Dysuria  ENT: [ ] Rhinorrhea    ALLERGIES: Allergies    IV Contrast (Urticaria)    Intolerances      VITALS/DATA/ORDERS: [] Reviewed    DEVICES:   [] Restraints [x] PIVs [] ET tube [] central line [] PICC [x] arterial line [] kolb [] NGT/OGT [] EVD [] LD [] ERIN/HMV [] LiCOX [] ICP monitor [] Trach [] PEG [] Chest Tube [] other:    EXAMINATION:  General: No acute distress  HEENT: Anicteric sclerae  Cardiac: [id number]  Lungs: Clear  Abdomen: Soft, non-tender, +BS  Extremities: No c/c/e  Skin/Incision Site: Clean, dry and intact  Neurologic: Awake, alert, expressive aphasia with some difficulty with comprehension but largely able to follow simple commands, right facial droop, EOMI, perrl RUE weak distally but able to lift off the plane of bed. RLE 5/5, L side 5/5, reports sensation deficit on the right.  SUMMARY: 53 y/o female with pmhx of a fib on Eliquis, history of rheumatic heart disease s/p MV repair, history of TIA, presented to [facility] for right sided weakness, global aphasia. LKN 7 PM on 9/28. Patient was not a candidate for IV tpa 2/2 to being on anticoagulation. Patient transferred here for mechanical thrombectomy with TICI 2c recannalization. NIHSS prior to MT 18 improved to 7 post thrombectomy.     HOSPITAL COURSE: As per \[facility]\""    ADMISSION SCORES:   NIHSS: 18; Pre stroke MRS 0    REVIEW OF SYSTEMS:  REVIEW OF SYSTEMS: [x] Unable to Assess due to neurologic exam   [ ] All ROS addressed below are non-contributory, except:  Neuro: [ ] Headache [ ] Back pain [ ] Numbness [ ] Weakness [ ] Ataxia [ ] Dizziness [ ] Aphasia [ ] Dysarthria [ ] Visual disturbance  Resp: [ ] Shortness of breath/dyspnea [ ] Orthopnea [ ] Cough  CV: [ ] Chest pain [ ] Palpitation [ ] Lightheadedness [ ] Syncope  Renal: [ ] Thirst [ ] Edema  GI: [ ] Nausea [ ] Emesis [ ] Abdominal pain [ ] Constipation [ ] Diarrhea  Hem: [ ] Hematemesis [ ] bBright red blood per rectum  ID: [ ] Fever [ ] Chills [ ] Dysuria  ENT: [ ] Rhinorrhea    ALLERGIES: Allergies    IV Contrast (Urticaria)    Intolerances      VITALS/DATA/ORDERS: [] Reviewed    DEVICES:   [] Restraints [x] PIVs [] ET tube [] central line [] PICC [x] arterial line [] kolb [] NGT/OGT [] EVD [] LD [] ERIN/HMV [] LiCOX [] ICP monitor [] Trach [] PEG [] Chest Tube [] other:    EXAMINATION:  General: No acute distress  HEENT: Anicteric sclerae  Cardiac: [id number]  Lungs: Clear  Abdomen: Soft, non-tender, +BS  Extremities: No c/c/e  Skin/Incision Site: Clean, dry and intact  Neurologic: Awake, alert, expressive aphasia with some difficulty with comprehension but largely able to follow commands, right facial droop, somewhat able to cross midline to the right. Not blinking to threat to the right. RUE weak distally, proximally 5/5. RLE 5/5, L side 5/5. No sensation deficit.

## 2022-09-30 PROCEDURE — 99233 SBSQ HOSP IP/OBS HIGH 50: CPT

## 2022-09-30 PROCEDURE — 70450 CT HEAD/BRAIN W/O DYE: CPT | Mod: 26

## 2022-09-30 PROCEDURE — 70551 MRI BRAIN STEM W/O DYE: CPT | Mod: 26

## 2022-09-30 PROCEDURE — G0452: CPT | Mod: 26

## 2022-09-30 RX ORDER — ASPIRIN/CALCIUM CARB/MAGNESIUM 324 MG
81 TABLET ORAL DAILY
Refills: 0 | Status: DISCONTINUED | OUTPATIENT
Start: 2022-09-30 | End: 2022-10-01

## 2022-09-30 RX ORDER — ENOXAPARIN SODIUM 100 MG/ML
40 INJECTION SUBCUTANEOUS EVERY 24 HOURS
Refills: 0 | Status: DISCONTINUED | OUTPATIENT
Start: 2022-09-30 | End: 2022-10-01

## 2022-09-30 RX ORDER — SODIUM CHLORIDE 9 MG/ML
500 INJECTION, SOLUTION INTRAVENOUS ONCE
Refills: 0 | Status: COMPLETED | OUTPATIENT
Start: 2022-09-30 | End: 2022-09-30

## 2022-09-30 RX ORDER — ALPRAZOLAM 0.25 MG
0.25 TABLET ORAL ONCE
Refills: 0 | Status: DISCONTINUED | OUTPATIENT
Start: 2022-09-30 | End: 2022-09-30

## 2022-09-30 RX ADMIN — POLYETHYLENE GLYCOL 3350 17 GRAM(S): 17 POWDER, FOR SOLUTION ORAL at 17:48

## 2022-09-30 RX ADMIN — POLYETHYLENE GLYCOL 3350 17 GRAM(S): 17 POWDER, FOR SOLUTION ORAL at 07:07

## 2022-09-30 RX ADMIN — Medication 81 MILLIGRAM(S): at 11:19

## 2022-09-30 RX ADMIN — Medication 80 MILLIGRAM(S): at 05:14

## 2022-09-30 RX ADMIN — ATORVASTATIN CALCIUM 80 MILLIGRAM(S): 80 TABLET, FILM COATED ORAL at 22:34

## 2022-09-30 RX ADMIN — ENOXAPARIN SODIUM 40 MILLIGRAM(S): 100 INJECTION SUBCUTANEOUS at 17:48

## 2022-09-30 RX ADMIN — CHLORHEXIDINE GLUCONATE 1 APPLICATION(S): 213 SOLUTION TOPICAL at 11:19

## 2022-09-30 RX ADMIN — Medication 80 MILLIGRAM(S): at 17:47

## 2022-09-30 RX ADMIN — SODIUM CHLORIDE 1000 MILLILITER(S): 9 INJECTION, SOLUTION INTRAVENOUS at 18:00

## 2022-09-30 NOTE — PROGRESS NOTE ADULT - ASSESSMENT
52F R-handed PMHx RF s/p valve transplant and Afib (on Eliquis; last dose at 1700) who initially presented to [facility] ED with R hand weakness and sudden onset of "not able to talk." Code stroke activated at [facility] ED. NIHSS 18. LKN 1907 9/28/22. mRS 0. Pt was not a tPA candidate due to elevated INR but a thrombectomy candidate due to LVO seen on CTA H/N. Pt transferred to [facility]. Pt now s/p thrombectomy. On re-exam, pt NIHSS 14. Vitals stable.    IMPRESSION: R hemiparesis, hemineglect, and expressive aphasia likely 2/2 Left brain dysfunction due to Left MCA M2 segment occlusion. Mechanism likely cardio-embolic i/s/o known Afib.      Underwent successful TICI 2c recanalization of left M2, still with significant aphasia motor exam, improving.  Etiology cardioembolic.      PLAN:  [] Neuro checks per unit protocol: q2h  [x] repeat CTH stable,  [x] c/w ASA 81mg   [x] c/w Atorvastatin 80mg QD  [] MRI brain w/o contrast to look at the extent and distribution of the stroke   [] MRA H/N to look at the cerebral vasculature.   [] will consider eliquis after MRI results  [] TTE with bubble study and telemetry to look for a cardiac source of embolism. Moderately dilate LA on echo.  Cardiology consulted- hypercoagulable work up pending for doac failure, echo with bubble study pending  [] +/- ILR depending on TTE  [x] HbA1C 5.2 and   [x] Telemonitoring; Neurochecks and Vital signs per unit protocol  [x] BP management per NSCU team, SBP goal 100-160  [x] PT/OT evaluation: recommend [facility] inpatient  [x] Reg diet  [] Stroke education provided     Patient seen on AM rounds with stroke attending.  Recommendations not final until attested by attending. 52F R-handed PMHx RF s/p valve transplant and Afib (on Eliquis; last dose at 1700) who initially presented to [facility] ED with R hand weakness and sudden onset of "not able to talk." Code stroke activated at [facility] ED. NIHSS 18. LKN 1907 9/28/22. mRS 0. Pt was not a tPA candidate due to elevated INR but a thrombectomy candidate due to LVO seen on CTA H/N. Pt transferred to [facility]. Pt now s/p thrombectomy. On re-exam, pt NIHSS 14. Vitals stable.    IMPRESSION: R hemiparesis, hemineglect, and expressive aphasia likely 2/2 Left brain dysfunction due to Left MCA M2 segment occlusion. Mechanism likely cardio-embolic i/s/o known Afib.      Underwent successful TICI 2c recanalization of left M2, still with significant aphasia motor exam, improving.  Etiology cardioembolic.      PLAN:  [] Neuro checks per unit protocol: q2h  [x] repeat CTH stable,  [x] c/w ASA 81mg   [x] c/w Atorvastatin 80mg QD  [] MRI brain w/o contrast to look at the extent and distribution of the stroke   [] MRA H/N to look at the cerebral vasculature.   [] will consider eliquis after MRI results  [] TTE with bubble study and telemetry to look for a cardiac source of embolism. Moderately dilate LA on echo.  Cardiology consulted- hypercoagulable work up pending for doac failure, echo with bubble study pending    [x] HbA1C 5.2 and   [x] Telemonitoring; Neurochecks and Vital signs per unit protocol  [x] BP management per NSCU team, SBP goal 100-160  [x] PT/OT evaluation: recommend [facility] inpatient  [x] Reg diet  [] Stroke education provided     Patient seen on AM rounds with stroke attending.  Recommendations not final until attested by attending.

## 2022-09-30 NOTE — SWALLOW BEDSIDE ASSESSMENT ADULT - ASR SWALLOW ASPIRATION MONITOR
Monitor for s/s aspiration/laryngeal penetration. If noted:  D/C p.o. intake, provide non-oral nutrition/hydration/meds, and contact this service @ [phone]/change of breathing pattern/cough/gurgly voice/fever/pneumonia/throat clearing/upper respiratory infection

## 2022-09-30 NOTE — SPEECH LANGUAGE PATHOLOGY EVALUATION - SLP DIAGNOSIS
Pt is a 53 y/o female presented to [facility] for right sided weakness, global aphasia, s/p thrombectomy, found to have left MCA occlusion. Patient presents with mixed expressive/receptive aphasia Pt is a 51 y/o female presented to [facility] for right sided weakness, global aphasia, s/p thrombectomy, found to have left MCA occlusion. Patient presents with nonfluent mixed expressive/receptive aphasia. Pt is predominantly nonverbal with attempts made at vocalizations, inability to repeat/name common objects, impaired comprehension, and inability to consistently follow 1 step directives. Given severity of aphasia, unable to fully assess cognitive communication skills.

## 2022-09-30 NOTE — PROGRESS NOTE ADULT - ASSESSMENT
ASSESSMENT/PLAN: 54 y/o female with L MCA s/p mechanical thrombectomy with TICI 2C recannilization.     NEURO:  Neurochecks Q1H  CTH post thrombectomy stable  CTH- possible heme vs. contrast extravasation  hold ASA and Lovenox per cth - f/u tmw scan   Stroke core measures  Activity: [] mobilize as tolerated [x] Bedrest [] PT [] OT [] PMNR    PULM:  spo2>92% on Room air    CV:  SBP goal 100-160 mmHg  History of MV repair, history of rheumatic heart disease  Cardiology consulted- recommend hypercoagulable work up, echo with bubble study  lipitor 80 qd (ldl>100)  will start Sotalol (home med)    RENAL:  Fluids: IVL    GI:  Diet: reg diet  GI prophylaxis [x] not indicated [] PPI [] other:  Bowel regimen [x] Miralax [x] senna [] other:    ENDO:   A1c 5.2  Goal euglycemia (-180)    HEME/ONC:  VTE prophylaxis: [x] SCDs [] chemoprophylaxis [] hold chemoprophylaxis until stability CTH tomorrow AM    ID:  Afebrile, no leukocytosis  Lower extremity doppler negative.     MISC:    SOCIAL/FAMILY:  [] awaiting [x] updated at bedside [] family meeting    CODE STATUS:  [x] Full Code [] DNR [] DNI [] Palliative/Comfort Care    DISPOSITION:  [x] ICU [] Stroke Unit [] Floor [] EMU [] RCU [] PCU   ASSESSMENT/PLAN: 54 y/o female with L MCA s/p mechanical thrombectomy with TICI 2C recannilization.     NEURO:  Neurochecks Q2H  CTH post thrombectomy stable  CTH- possible heme vs. contrast extravasation  start ASA and Lovenox per cth scan stable   Stroke core measures  Activity: [x] mobilize as tolerated [] Bedrest [] PT [] OT [] PMNR    PULM:  spo2>92% on Room air    CV:  SBP goal 100-160 mmHg  History of MV repair, history of rheumatic heart disease  Cardiology consulted- hypercoagulable work up pending for doac failure, echo with bubble study pending  lipitor 80 qd (ldl>100)  restart Sotalol (home med)    RENAL:  Fluids: IVL    GI:  Diet: reg diet  GI prophylaxis [x] not indicated [] PPI [] other:  Bowel regimen [x] Miralax [x] senna [] other:    ENDO:   A1c 5.2  Goal euglycemia (-180)    HEME/ONC:  VTE prophylaxis: [x] SCDs [x] chemoprophylaxis -lovenox [] hold chemoprophylaxis    ID:  Afebrile, no leukocytosis  Lower extremity doppler negative.     MISC:    SOCIAL/FAMILY:  [] awaiting [x] updated at bedside [] family meeting    CODE STATUS:  [x] Full Code [] DNR [] DNI [] Palliative/Comfort Care    DISPOSITION: tx ANGULO  [x] ICU [] Stroke Unit [] Floor [] EMU [] RCU [] PCU

## 2022-09-30 NOTE — SWALLOW BEDSIDE ASSESSMENT ADULT - SLP GENERAL OBSERVATIONS
Patient encountered upright in bed, on room air, awake/alert, + icu monitoring (VSS). Oriented to name/ and location given field option of 2 and communicative via gestures. Patient presents with severe expressive aphasia and moderate receptive aphasia. See speech-language evaluation for details. Patient encountered upright in bed, on room air, awake/alert, + icu monitoring (VSS). Oriented to name/ and location given field option of 2 and communicative via gestures. Patient presents with Brocas aphasia. See speech-language evaluation for details. Patient encountered upright in bed, on room air, awake/alert, + icu monitoring (VSS). Oriented to name/ and location given field option of 2 and communicative via gestures. Patient presents with nonfluent mixed expressive/receptive aphasia. See speech-language evaluation for details.

## 2022-09-30 NOTE — PROGRESS NOTE ADULT - SUBJECTIVE AND OBJECTIVE BOX
DATE OF SERVICE: 09-30-22 @ 13:34  CHIEF COMPLAINT:Patient is a 52y old  Female who presents with a chief complaint of Left MCA M2 segment occlusion (30 Sep 2022 07:09)    	        PAST MEDICAL & SURGICAL HISTORY:  Rheumatic fever  as a teen, s/p mitral valve repair 1991      TIA (transient ischemic attack)  on Aspirin      Afib      S/P mitral valve repair  1991      H/O right knee surgery      S/P MVR (mitral valve replacement)  Re op MVR (T)              REVIEW OF SYSTEMS:  aphasic  RESPIRATORY: No cough, wheezing, chills or hemoptysis; No Shortness of Breath  CARDIOVASCULAR: No chest pain, palpitations, passing out, dizziness, or leg swelling  GASTROINTESTINAL: No abdominal or epigastric pain. No nausea, vomiting, or hematemesis;   GENITOURINARY: No dysuria, frequency, hematuria, or incontinence  NEUROLOGICAL: No headaches,     Medications:  MEDICATIONS  (STANDING):  aspirin  chewable 81 milliGRAM(s) Oral daily  atorvastatin 80 milliGRAM(s) Oral at bedtime  chlorhexidine 4% Liquid 1 Application(s) Topical daily  enoxaparin Injectable 40 milliGRAM(s) SubCutaneous every 24 hours  polyethylene glycol 3350 17 Gram(s) Oral two times a day  senna 2 Tablet(s) Oral at bedtime  sotalol 80 milliGRAM(s) Oral every 12 hours    MEDICATIONS  (PRN):  acetaminophen     Tablet .. 1000 milliGRAM(s) Oral every 6 hours PRN Temp greater or equal to 38C (100.4F), Mild Pain (1 - 3)    	    PHYSICAL EXAM:  T(C): 36.3 (09-30-22 @ 11:00), Max: 36.8 (09-29-22 @ 15:00)  HR: 54 (09-30-22 @ 11:00) (54 - 81)  BP: 125/52 (09-30-22 @ 11:00) (105/50 - 134/68)  RR: 21 (09-30-22 @ 11:00) (13 - 22)  SpO2: 95% (09-30-22 @ 11:00) (93% - 100%)  Wt(kg): --  I&O's Summary    29 Sep 2022 07:01  -  30 Sep 2022 07:00  --------------------------------------------------------  IN: 1250 mL / OUT: 1250 mL / NET: 0 mL    30 Sep 2022 07:01  -  30 Sep 2022 13:34  --------------------------------------------------------  IN: 520 mL / OUT: 0 mL / NET: 520 mL        Appearance: Normal	  HEENT:   Normal oral mucosa, PERRL, EOMI	  Lymphatic: No lymphadenopathy  Cardiovascular: Normal S1 S2, No JVD, No murmurs, No edema  Respiratory: Lungs clear to auscultation	  Psychiatry: A & O x 3, Mood & affect appropriate  Gastrointestinal:  Soft, Non-tender, + BS	  Skin: No rashes, No ecchymoses, No cyanosis	  Neurologic: aphasic  r side dparesis     TELEMETRY: 	    ECG:  	  RADIOLOGY:  OTHER: 	  	  LABS:	 	    CARDIAC MARKERS:                                13.1   10.34 )-----------( 169      ( 28 Sep 2022 23:53 )             42.2     09-29    138  |  108  |  11  ----------------------------<  129<H>  4.0   |  19<L>  |  0.80    Ca    8.9      29 Sep 2022 20:59  Phos  3.3     09-29  Mg     2.2     09-29    TPro  7.0  /  Alb  3.8  /  TBili  0.5  /  DBili  0.2  /  AST  22  /  ALT  16  /  AlkPhos  84  09-28    proBNP:   Lipid Profile:   HgA1c:   TSH:

## 2022-09-30 NOTE — SPEECH LANGUAGE PATHOLOGY EVALUATION - SLP GENERAL OBSERVATIONS
Patient encountered upright in bed, on room air, awake/alert, + icu monitoring (VSS). Oriented to name/ and location given field option of 2 and communicative via gestures.

## 2022-09-30 NOTE — PROGRESS NOTE ADULT - SUBJECTIVE AND OBJECTIVE BOX
Neurology Progress Note    SUBJECTIVE/OBJECTIVE/INTERVAL EVENTS: Patient seen and examined at bedside w/ neuro attending and team.     INTERVAL HISTORY:    REVIEW OF SYSTEMS: Otherwise denies fever, chills, headaches, vision changes, blurry vision, double vision, nausea, vomiting, hearing change, focal weakness, focal numbness, parasthesias, bowel/ bladder incontinence.  Few questions of a 10-system ROS was performed and is negative except for those items noted above and/or in the HPI.    VITALS & EXAMINATION:  Vital Signs Last 24 Hrs  T(C): 36.3 (30 Sep 2022 11:00), Max: 36.8 (29 Sep 2022 15:00)  T(F): 97.3 (30 Sep 2022 11:00), Max: 98.3 (29 Sep 2022 19:00)  HR: 54 (30 Sep 2022 11:00) (54 - 81)  BP: 125/52 (30 Sep 2022 11:00) (105/50 - 134/68)  BP(mean): 74 (30 Sep 2022 11:00) (66 - 92)  RR: 21 (30 Sep 2022 11:00) (13 - 22)  SpO2: 95% (30 Sep 2022 11:00) (93% - 100%)    Parameters below as of 30 Sep 2022 07:00  Patient On (Oxygen Delivery Method): room air    General - comfortable female in no acute distress  Cardiovascular - Peripheral pulses palpable, no edema  Eyes - Fundoscopy not performed due to safety precautions in the setting of the COVID-19 pandemic    Neurologic Exam:  Mental status - Awake, Alert, and not oriented. Garbled speech (appears to acknowledge question but difficulty in answer questions) and intermittently following simple commands (like squeezing hands and closing eyes tight). Comprehends simple commands but has difficulty getting words out and speaking    Cranial nerves -       Motor - normal bulk and tone throughout.   RUE 3/5 at shoulder, 2/5 forearm, no drift but wrist drop present. LUE 4/5. RLE no drift, 4-/5. LLE no drift, 4/5.    Sensation - R hemineglect     DTR's -             Biceps      Triceps     Brachioradialis      Patellar    Ankle    Toes/plantar response  R             1+             1+             1+                       1+           1+                 mute   L              1+             1+             1+                        1+           1+                 mute    Coordination -  unable to assess due to change in mental status     Gait and station -   unable to assess due to concern for patient safety  LABORATORY:  CBC                       13.1   10.34 )-----------( 169      ( 28 Sep 2022 23:53 )             42.2     Chem 09-29    138  |  108  |  11  ----------------------------<  129<H>  4.0   |  19<L>  |  0.80    Ca    8.9      29 Sep 2022 20:59  Phos  3.3     09-29  Mg     2.2     09-29    TPro  7.0  /  Alb  3.8  /  TBili  0.5  /  DBili  0.2  /  AST  22  /  ALT  16  /  AlkPhos  84  09-28    LFTs LIVER FUNCTIONS - ( 28 Sep 2022 23:54 )  Alb: 3.8 g/dL / Pro: 7.0 g/dL / ALK PHOS: 84 U/L / ALT: 16 U/L / AST: 22 U/L / GGT: x           Coagulopathy PT/INR - ( 28 Sep 2022 23:54 )   PT: 17.2 sec;   INR: 1.49 ratio         PTT - ( 28 Sep 2022 23:54 )  PTT:32.3 sec  Lipid Panel 09-28 Chol 177 LDL -- HDL 62 Trig 58  A1c   Cardiac enzymes     U/A   CSF  Immunological  Other    STUDIES & IMAGING: (EEG, CT, MR, U/S, TTE/DONOVAN):    TTE 9/29  1. Bioprosthetic mitral valve replacement. The valve is  well seated.  Minimal mitral transvalvular regurgitation  normal washing jets.  No mitral paravalvular regurgitation  seen. Peak mitral valve gradient equals 13 mm Hg, mean  transmitral valve gradient equals 4 mm Hg, which is  probably normal in the setting of a bioprosthetic mitral  valve replacement. Gradients are measured at a HR of 68bpm  2. Normal trileaflet aortic valve. No aortic valve  regurgitation seen.  3. Normal left ventricular systolic function. No segmental  wall motion abnormalities. Endocardial visualization  enhanced with intravenous injection of Ultrasonic Enhancing  Agent (Definity). No left ventricular thrombus.  4. Agitated saline study is uniterpretable as it is done  after use of Ultrasonic Enhancing Agent (Definity), must  wait 24 hours for aqequate agitated saline study.  *** Compared with echocardiogram of 5/12/2021, no  significant changes noted.        ACC: [id number] EXAM: CT BRAIN    PROCEDURE DATE: 09/30/2022        INTERPRETATION: Noncontrast CT of the brain.    CLINICAL INDICATION: Status post left M2 thrombectomy    TECHNIQUE : Axial CT scanning of the brain was obtained from the skull base to the vertex without the administration of intravenous contrast. Sagittal and coronal reformats were provided.    COMPARISON: CT brain 9/29/2022    FINDINGS:    Limited by motion.    Interval demarcation of left lateral frontoparietal acute infarction.    No loren hemorrhagic transformation.    No midline shift or effacement of basal cisterns.    No hydrocephalus.    IMPRESSION:    Interval demarcation of left lateral frontoparietal acute infarction.    No loren hemorrhagic transformation.   Neurology Progress Note    SUBJECTIVE/OBJECTIVE/INTERVAL EVENTS: Patient seen and examined at bedside w/ neuro attending and team.     INTERVAL HISTORY: improving, no acute events reported    REVIEW OF SYSTEMS: Otherwise denies new fever, chills, headaches, vision changes, blurry vision, double vision, nausea, vomiting, hearing change, bowel/ bladder incontinence.      VITALS & EXAMINATION:  Vital Signs Last 24 Hrs  T(C): 36.3 (30 Sep 2022 11:00), Max: 36.8 (29 Sep 2022 15:00)  T(F): 97.3 (30 Sep 2022 11:00), Max: 98.3 (29 Sep 2022 19:00)  HR: 54 (30 Sep 2022 11:00) (54 - 81)  BP: 125/52 (30 Sep 2022 11:00) (105/50 - 134/68)  BP(mean): 74 (30 Sep 2022 11:00) (66 - 92)  RR: 21 (30 Sep 2022 11:00) (13 - 22)  SpO2: 95% (30 Sep 2022 11:00) (93% - 100%)    Parameters below as of 30 Sep 2022 07:00  Patient On (Oxygen Delivery Method): room air    General - comfortable female in no acute distress  Cardiovascular - Peripheral pulses palpable, no edema  Eyes - Fundoscopy not performed due to safety precautions in the setting of the COVID-19 pandemic    Neurologic Exam:  Mental status - Awake, Alert, and not oriented. Garbled speech (appears to acknowledge question but difficulty in answer questions) and intermittently following simple commands (like squeezing hands and closing eyes tight). Comprehends simple commands but has difficulty getting words out and speaking    Cranial nerves -       Motor - normal bulk and tone throughout.   RUE 3/5 at shoulder, 2/5 forearm, no drift but wrist drop present. LUE 4/5. RLE no drift, 4-/5. LLE no drift, 4/5.    Sensation - R hemineglect     DTR's -             Biceps      Triceps     Brachioradialis      Patellar    Ankle    Toes/plantar response  R             1+             1+             1+                       1+           1+                 mute   L              1+             1+             1+                        1+           1+                 mute    Coordination -  unable to assess due to change in mental status     Gait and station -   unable to assess due to concern for patient safety  LABORATORY:  CBC                       13.1   10.34 )-----------( 169      ( 28 Sep 2022 23:53 )             42.2     Chem 09-29    138  |  108  |  11  ----------------------------<  129<H>  4.0   |  19<L>  |  0.80    Ca    8.9      29 Sep 2022 20:59  Phos  3.3     09-29  Mg     2.2     09-29    TPro  7.0  /  Alb  3.8  /  TBili  0.5  /  DBili  0.2  /  AST  22  /  ALT  16  /  AlkPhos  84  09-28    LFTs LIVER FUNCTIONS - ( 28 Sep 2022 23:54 )  Alb: 3.8 g/dL / Pro: 7.0 g/dL / ALK PHOS: 84 U/L / ALT: 16 U/L / AST: 22 U/L / GGT: x           Coagulopathy PT/INR - ( 28 Sep 2022 23:54 )   PT: 17.2 sec;   INR: 1.49 ratio         PTT - ( 28 Sep 2022 23:54 )  PTT:32.3 sec  Lipid Panel 09-28 Chol 177 LDL -- HDL 62 Trig 58  A1c   Cardiac enzymes     U/A   CSF  Immunological  Other    STUDIES & IMAGING: (EEG, CT, MR, U/S, TTE/DONOVAN):    TTE 9/29  1. Bioprosthetic mitral valve replacement. The valve is  well seated.  Minimal mitral transvalvular regurgitation  normal washing jets.  No mitral paravalvular regurgitation  seen. Peak mitral valve gradient equals 13 mm Hg, mean  transmitral valve gradient equals 4 mm Hg, which is  probably normal in the setting of a bioprosthetic mitral  valve replacement. Gradients are measured at a HR of 68bpm  2. Normal trileaflet aortic valve. No aortic valve  regurgitation seen.  3. Normal left ventricular systolic function. No segmental  wall motion abnormalities. Endocardial visualization  enhanced with intravenous injection of Ultrasonic Enhancing  Agent (Definity). No left ventricular thrombus.  4. Agitated saline study is uniterpretable as it is done  after use of Ultrasonic Enhancing Agent (Definity), must  wait 24 hours for aqequate agitated saline study.  *** Compared with echocardiogram of 5/12/2021, no  significant changes noted.        ACC: [id number] EXAM: CT BRAIN    PROCEDURE DATE: 09/30/2022        INTERPRETATION: Noncontrast CT of the brain.    CLINICAL INDICATION: Status post left M2 thrombectomy    TECHNIQUE : Axial CT scanning of the brain was obtained from the skull base to the vertex without the administration of intravenous contrast. Sagittal and coronal reformats were provided.    COMPARISON: CT brain 9/29/2022    FINDINGS:    Limited by motion.    Interval demarcation of left lateral frontoparietal acute infarction.    No loren hemorrhagic transformation.    No midline shift or effacement of basal cisterns.    No hydrocephalus.    IMPRESSION:    Interval demarcation of left lateral frontoparietal acute infarction.    No loren hemorrhagic transformation.

## 2022-09-30 NOTE — SWALLOW BEDSIDE ASSESSMENT ADULT - COMMENTS
9/30 CT HEAD MPRESSION:  Interval demarcation of left lateral frontoparietal acute infarction.  No loren hemorrhagic transformation.    SWALLOW HISTORY: No reports in SCM or in PACS prior to this admission. 9/30 CT HEAD IMPRESSION:  Interval demarcation of left lateral frontoparietal acute infarction.  No loren hemorrhagic transformation.    SWALLOW HISTORY: No reports in SCM or in PACS prior to this admission.

## 2022-09-30 NOTE — PROGRESS NOTE ADULT - SUBJECTIVE AND OBJECTIVE BOX
DATE OF SERVICE: 09-30-22 @ 07:09    Subjective: Patient seen and examined. No new events except as noted.     SUBJECTIVE/ROS:  nad      MEDICATIONS:  MEDICATIONS  (STANDING):  atorvastatin 80 milliGRAM(s) Oral at bedtime  chlorhexidine 4% Liquid 1 Application(s) Topical daily  polyethylene glycol 3350 17 Gram(s) Oral two times a day  senna 2 Tablet(s) Oral at bedtime  sotalol 80 milliGRAM(s) Oral every 12 hours      PHYSICAL EXAM:  T(C): 36.7 (09-30-22 @ 03:00), Max: 36.8 (09-29-22 @ 15:00)  HR: 61 (09-30-22 @ 06:00) (61 - 81)  BP: 132/73 (09-30-22 @ 06:00) (105/50 - 134/68)  RR: 14 (09-30-22 @ 06:00) (13 - 22)  SpO2: 100% (09-30-22 @ 06:00) (93% - 100%)  Wt(kg): --  I&O's Summary    29 Sep 2022 07:01  -  30 Sep 2022 07:00  --------------------------------------------------------  IN: 1250 mL / OUT: 1250 mL / NET: 0 mL            JVP: Normal  Neck: supple  Lung: clear   CV: S1 S2 , Murmur:  Abd: soft  Ext: No edema  neuro: Awake / alert  Psych: flat affect  Skin: normal``    LABS/DATA:    CARDIAC MARKERS:                                13.1   10.34 )-----------( 169      ( 28 Sep 2022 23:53 )             42.2     09-29    138  |  108  |  11  ----------------------------<  129<H>  4.0   |  19<L>  |  0.80    Ca    8.9      29 Sep 2022 20:59  Phos  3.3     09-29  Mg     2.2     09-29    TPro  7.0  /  Alb  3.8  /  TBili  0.5  /  DBili  0.2  /  AST  22  /  ALT  16  /  AlkPhos  84  09-28    proBNP:   Lipid Profile:   HgA1c:   TSH:     TELE:  EKG:      < from: TTE with Doppler (w/Cont) (09.29.22 @ 07:06) >  1. Bioprosthetic mitral valve replacement. The valve is  well seated.  Minimal mitral transvalvular regurgitation  normal washing jets.  No mitral paravalvular regurgitation  seen. Peak mitral valve gradient equals 13 mm Hg, mean  transmitral valve gradient equals 4 mm Hg, which is  probably normal in the setting of a bioprosthetic mitral  valve replacement. Gradients are measured at a HR of 68bpm  2. Normal trileaflet aortic valve. No aortic valve  regurgitation seen.  3. Normal left ventricular systolic function. No segmental  wall motion abnormalities. Endocardial visualization  enhanced with intravenous injection of Ultrasonic Enhancing  Agent (Definity). No left ventricular thrombus.  4. Agitated saline study is uniterpretable as it is done  after use of Ultrasonic Enhancing Agent (Definity), must  wait 24 hours for aqequate agitated saline study.  *** Compared with echocardiogram of 5/12/2021, no  significant changes noted.    < end of copied text >

## 2022-09-30 NOTE — PROGRESS NOTE ADULT - ASSESSMENT
52F R-handed PMHx RF s/p valve transplant and Afib  who initially presented to [facility] ED with R hand weakness and sudden onset of "not able to talk." Code stroke activated at [facility] ED. NIHSS 18. LKN 1907 9/28/22. mRS 0.   Pt was not a tPA candidate due to elevated INR but a thrombectomy candidate due to LVO seen on CTA H/N.  Pt now s/p thrombectomy.     R hemiparesis, hemineglect, and aphasia likely 2/2 Left brain dysfunction due to Left MCA M2 segment occlusion. Mechanism likely cardio-embolic i/s/o known Afib.   heme eval ? hypercoag    a/c as per nsicu   Atorvastatin    f/u cts noted  [] TTE with bubble study   on regular diet  [] Telemonitoring; Neurochecks and Vital signs per unit protocol    [] PT/OT evaluation  cards f/u noted

## 2022-09-30 NOTE — SWALLOW BEDSIDE ASSESSMENT ADULT - SWALLOW EVAL: DIAGNOSIS
Pt is a 51 y/o female presented to [facility] for right sided weakness, global aphasia, s/p thrombectomy, found to have left MCA occlusion. Pt presents with an oropharyngeal swallow sequence that appears WFL to tolerate a regular texture diet with no overt s/s of laryngeal penetration or aspiration.

## 2022-09-30 NOTE — PROGRESS NOTE ADULT - ASSESSMENT
Acute CVA  likely embolic   pt was on a/c for PAF   she is compliant and her coags were elevated on admission   ? NOAC failure   fu with neurology   may need to switch to lovenox or coumadin   obtain lower ext venous doppler   would get Heme eval for hypercoag work up     PAF   in sinus   resume sotalol   monitor qtc   a/c as above     s/p MVR  stable on echo     HTN  permissive HTN as per neurology

## 2022-09-30 NOTE — PROGRESS NOTE ADULT - SUBJECTIVE AND OBJECTIVE BOX
SUMMARY: 51 y/o female with pmhx of a fib on Eliquis, history of rheumatic heart disease s/p MV repair, history of TIA, presented to [facility] for right sided weakness, global aphasia. LKN 7 PM on 9/28. Patient was not a candidate for IV tpa 2/2 to being on anticoagulation. Patient transferred here for mechanical thrombectomy with TICI 2c recannalization. NIHSS prior to MT 18 improved to 7 post thrombectomy.     HOSPITAL COURSE: As per [facility]    ADMISSION SCORES:   NIHSS: 18; Pre stroke MRS 0    REVIEW OF SYSTEMS:  REVIEW OF SYSTEMS: [x] Unable to Assess due to neurologic exam   [ ] All ROS addressed below are non-contributory, except:  Neuro: [ ] Headache [ ] Back pain [ ] Numbness [ ] Weakness [ ] Ataxia [ ] Dizziness [ ] Aphasia [ ] Dysarthria [ ] Visual disturbance  Resp: [ ] Shortness of breath/dyspnea [ ] Orthopnea [ ] Cough  CV: [ ] Chest pain [ ] Palpitation [ ] Lightheadedness [ ] Syncope  Renal: [ ] Thirst [ ] Edema  GI: [ ] Nausea [ ] Emesis [ ] Abdominal pain [ ] Constipation [ ] Diarrhea  Hem: [ ] Hematemesis [ ] bBright red blood per rectum  ID: [ ] Fever [ ] Chills [ ] Dysuria  ENT: [ ] Rhinorrhea    ALLERGIES: Allergies    IV Contrast (Urticaria)    Intolerances      VITALS/DATA/ORDERS: [] Reviewed    DEVICES:   [] Restraints [x] PIVs [] ET tube [] central line [] PICC [x] arterial line [] kolb [] NGT/OGT [] EVD [] LD [] ERIN/HMV [] LiCOX [] ICP monitor [] Trach [] PEG [] Chest Tube [] other:    EXAMINATION:  General: No acute distress  HEENT: Anicteric sclerae  Cardiac: [id number]  Lungs: Clear  Abdomen: Soft, non-tender, +BS  Extremities: No c/c/e  Skin/Incision Site: Clean, dry and intact  Neurologic: Awake, alert, expressive aphasia with some difficulty with comprehension but largely able to follow commands, right facial droop, somewhat able to cross midline to the right. Not blinking to threat to the right. RUE weak distally, proximally 5/5. RLE 5/5, L side 5/5. No sensation deficit. SUMMARY: 53 y/o female with pmhx of a fib on Eliquis, history of rheumatic heart disease s/p MV repair, history of TIA, presented to [facility] for right sided weakness, global aphasia. LKN 7 PM on 9/28. Patient was not a candidate for IV tpa 2/2 to being on anticoagulation. Patient transferred here for mechanical thrombectomy with TICI 2c recannalization. NIHSS prior to MT 18 improved to 7 post thrombectomy.     HOSPITAL COURSE: As per HPI    Subjective: Pt seen and examined on rounds this AM. Pt has no acute complaints.    ADMISSION SCORES:   NIHSS: 18; Pre stroke MRS 0    REVIEW OF SYSTEMS:  REVIEW OF SYSTEMS: [x] Unable to Assess due to neurologic exam   [ ] All ROS addressed below are non-contributory, except:  Neuro: [ ] Headache [ ] Back pain [ ] Numbness [ ] Weakness [ ] Ataxia [ ] Dizziness [ ] Aphasia [ ] Dysarthria [ ] Visual disturbance  Resp: [ ] Shortness of breath/dyspnea [ ] Orthopnea [ ] Cough  CV: [ ] Chest pain [ ] Palpitation [ ] Lightheadedness [ ] Syncope  Renal: [ ] Thirst [ ] Edema  GI: [ ] Nausea [ ] Emesis [ ] Abdominal pain [ ] Constipation [ ] Diarrhea  Hem: [ ] Hematemesis [ ] bBright red blood per rectum  ID: [ ] Fever [ ] Chills [ ] Dysuria  ENT: [ ] Rhinorrhea    ALLERGIES: Allergies    IV Contrast (Urticaria)    Intolerances      VITALS/DATA/ORDERS: [] Reviewed    DEVICES:   [] Restraints [x] PIVs [] ET tube [] central line [] PICC [x] arterial line [] kolb [] NGT/OGT [] EVD [] LD [] ERIN/HMV [] LiCOX [] ICP monitor [] Trach [] PEG [] Chest Tube [] other:    EXAMINATION:  General: No acute distress  HEENT: Anicteric sclerae  Cardiac: [id number]  Lungs: Clear  Abdomen: Soft, non-tender, +BS  Extremities: No c/c/e  Skin/Incision Site: Clean, dry and intact  Neurologic: Awake, alert, A/Ox3 with choices, expressive aphasia with some difficulty with comprehension but largely able to follow commands, right facial droop, somewhat able to cross midline to the right. Not blinking to threat to the right. RUE weak distally (hand  1/5, wrist flexion/extension 2/5), proximally(delt 4/5, biceps/trcipes 3/5). LE 5/5 bilat. No sensation deficit.

## 2022-09-30 NOTE — SPEECH LANGUAGE PATHOLOGY EVALUATION - SLP PERTINENT HISTORY OF CURRENT PROBLEM
53 y/o female with pmhx of a fib on Eliquis, history of rheumatic heart disease s/p MV repair, history of TIA, presented to [facility] for right sided weakness, global aphasia. LKN 7 PM on 9/28. Patient was not a candidate for IV tpa 2/2 to being on anticoagulation. Patient transferred here for mechanical thrombectomy with TICI 2c recannalization. NIHSS prior to MT 18 improved to 7 post thrombectomy. Alert, with severe expressive aphasia, with some receptive aphasia, with slight L gaze preference but able to cross midline, R VF cut, R arm AG with drift, no movement in the hand, R HF and DF 5/5, L side with no drift 53 y/o female with pmhx of a fib on Eliquis, history of rheumatic heart disease s/p MV repair, history of TIA, presented to [facility] for right sided weakness, global aphasia. LKN 7 PM on 9/28. Patient was not a candidate for IV tpa 2/2 to being on anticoagulation. CTA showed Left MCA occlusion in the M2 distribution prompting immediate transfer to [facility] for mechanical thrombectomy. R hemiparesis, hemineglect, and aphasia likely 2/2 Left brain dysfunction due to Left MCA M2 segment occlusion. Mechanism likely cardio-embolic i/s/o known Afib.

## 2022-09-30 NOTE — SWALLOW BEDSIDE ASSESSMENT ADULT - SLP PERTINENT HISTORY OF CURRENT PROBLEM
53 y/o female with pmhx of a fib on Eliquis, history of rheumatic heart disease s/p MV repair, history of TIA, presented to [facility] for right sided weakness, global aphasia. LKN 7 PM on 9/28. Patient was not a candidate for IV tpa 2/2 to being on anticoagulation. Patient transferred here for mechanical thrombectomy with TICI 2c recannalization. NIHSS prior to MT 18 improved to 7 post thrombectomy. Alert, with severe expressive aphasia, with some receptive aphasia, with slight L gaze preference but able to cross midline, R VF cut, R arm AG with drift, no movement in the hand, R HF and DF 5/5, L side with no drift 53 y/o female with pmhx of a fib on Eliquis, history of rheumatic heart disease s/p MV repair, history of TIA, presented to [facility] for right sided weakness, global aphasia. LKN 7 PM on 9/28. Patient was not a candidate for IV tpa 2/2 to being on anticoagulation. CTA showed Left MCA occlusion in the M2 distribution prompting immediate transfer to [facility] for mechanical thrombectomy. R hemiparesis, hemineglect, and aphasia likely 2/2 Left brain dysfunction due to Left MCA M2 segment occlusion. Mechanism likely cardio-embolic i/s/o known Afib.

## 2022-10-01 LAB
ANION GAP SERPL CALC-SCNC: 14 MMOL/L — SIGNIFICANT CHANGE UP (ref 5–17)
BUN SERPL-MCNC: 13 MG/DL — SIGNIFICANT CHANGE UP (ref 7–23)
CALCIUM SERPL-MCNC: 9 MG/DL — SIGNIFICANT CHANGE UP (ref 8.4–10.5)
CARDIOLIPIN AB SER-ACNC: NEGATIVE — SIGNIFICANT CHANGE UP
CHLORIDE SERPL-SCNC: 104 MMOL/L — SIGNIFICANT CHANGE UP (ref 96–108)
CO2 SERPL-SCNC: 20 MMOL/L — LOW (ref 22–31)
CREAT SERPL-MCNC: 0.86 MG/DL — SIGNIFICANT CHANGE UP (ref 0.5–1.3)
EGFR: 80 ML/MIN/1.73M2 — SIGNIFICANT CHANGE UP
GLUCOSE SERPL-MCNC: 84 MG/DL — SIGNIFICANT CHANGE UP (ref 70–99)
HCT VFR BLD CALC: 38 % — SIGNIFICANT CHANGE UP (ref 34.5–45)
HCYS SERPL-MCNC: 17.8 UMOL/L — HIGH
HGB BLD-MCNC: 11.7 G/DL — SIGNIFICANT CHANGE UP (ref 11.5–15.5)
MCHC RBC-ENTMCNC: 25.3 PG — LOW (ref 27–34)
MCHC RBC-ENTMCNC: 30.8 GM/DL — LOW (ref 32–36)
MCV RBC AUTO: 82.3 FL — SIGNIFICANT CHANGE UP (ref 80–100)
NRBC # BLD: 0 /100 WBCS — SIGNIFICANT CHANGE UP (ref 0–0)
PLATELET # BLD AUTO: 168 K/UL — SIGNIFICANT CHANGE UP (ref 150–400)
POTASSIUM SERPL-MCNC: 4.1 MMOL/L — SIGNIFICANT CHANGE UP (ref 3.5–5.3)
POTASSIUM SERPL-SCNC: 4.1 MMOL/L — SIGNIFICANT CHANGE UP (ref 3.5–5.3)
RBC # BLD: 4.62 M/UL — SIGNIFICANT CHANGE UP (ref 3.8–5.2)
RBC # FLD: 14.4 % — SIGNIFICANT CHANGE UP (ref 10.3–14.5)
SODIUM SERPL-SCNC: 138 MMOL/L — SIGNIFICANT CHANGE UP (ref 135–145)
WBC # BLD: 7.14 K/UL — SIGNIFICANT CHANGE UP (ref 3.8–10.5)
WBC # FLD AUTO: 7.14 K/UL — SIGNIFICANT CHANGE UP (ref 3.8–10.5)

## 2022-10-01 PROCEDURE — 99233 SBSQ HOSP IP/OBS HIGH 50: CPT

## 2022-10-01 RX ORDER — APIXABAN 2.5 MG/1
5 TABLET, FILM COATED ORAL EVERY 12 HOURS
Refills: 0 | Status: DISCONTINUED | OUTPATIENT
Start: 2022-10-01 | End: 2022-10-03

## 2022-10-01 RX ADMIN — Medication 80 MILLIGRAM(S): at 17:04

## 2022-10-01 RX ADMIN — POLYETHYLENE GLYCOL 3350 17 GRAM(S): 17 POWDER, FOR SOLUTION ORAL at 17:03

## 2022-10-01 RX ADMIN — APIXABAN 5 MILLIGRAM(S): 2.5 TABLET, FILM COATED ORAL at 17:03

## 2022-10-01 RX ADMIN — POLYETHYLENE GLYCOL 3350 17 GRAM(S): 17 POWDER, FOR SOLUTION ORAL at 05:34

## 2022-10-01 RX ADMIN — Medication 80 MILLIGRAM(S): at 05:35

## 2022-10-01 RX ADMIN — SENNA PLUS 2 TABLET(S): 8.6 TABLET ORAL at 22:44

## 2022-10-01 RX ADMIN — ATORVASTATIN CALCIUM 80 MILLIGRAM(S): 80 TABLET, FILM COATED ORAL at 22:44

## 2022-10-01 NOTE — PROGRESS NOTE ADULT - ASSESSMENT
Acute CVA  likely embolic   a/c    PAF   in sinus   resume sotalol   monitor qtc   a/c    s/p MVR  stable on echo     HTN  permissive HTN as per neurology

## 2022-10-01 NOTE — PROGRESS NOTE ADULT - SUBJECTIVE AND OBJECTIVE BOX
DATE OF SERVICE: 10-01-22 @ 10:51    Subjective: Patient seen and examined. No new events except as noted.     SUBJECTIVE/ROS:  nad      MEDICATIONS:  MEDICATIONS  (STANDING):  apixaban 5 milliGRAM(s) Oral every 12 hours  atorvastatin 80 milliGRAM(s) Oral at bedtime  chlorhexidine 4% Liquid 1 Application(s) Topical daily  polyethylene glycol 3350 17 Gram(s) Oral two times a day  senna 2 Tablet(s) Oral at bedtime  sotalol 80 milliGRAM(s) Oral every 12 hours      PHYSICAL EXAM:  T(C): 37.1 (10-01-22 @ 10:00), Max: 37.6 (10-01-22 @ 06:00)  HR: 54 (10-01-22 @ 10:00) (52 - 65)  BP: 132/72 (10-01-22 @ 10:00) (111/80 - 144/75)  RR: 19 (10-01-22 @ 10:00) (10 - 21)  SpO2: 100% (10-01-22 @ 10:00) (95% - 100%)  Wt(kg): --  I&O's Summary    30 Sep 2022 07:01  -  01 Oct 2022 07:00  --------------------------------------------------------  IN: 1320 mL / OUT: 400 mL / NET: 920 mL            JVP: Normal  Neck: supple  Lung: clear   CV: S1 S2 , Murmur:  Abd: soft  Ext: No edema  neuro: Awake / alert  Psych: flat affect  Skin: normal``    LABS/DATA:    CARDIAC MARKERS:                                11.7   7.14  )-----------( 168      ( 01 Oct 2022 07:18 )             38.0     10-01    138  |  104  |  13  ----------------------------<  84  4.1   |  20<L>  |  0.86    Ca    9.0      01 Oct 2022 07:12  Phos  3.3     09-29  Mg     2.2     09-29      proBNP:   Lipid Profile:   HgA1c:   TSH:     TELE:  EKG:

## 2022-10-01 NOTE — PROGRESS NOTE ADULT - SUBJECTIVE AND OBJECTIVE BOX
THE PATIENT WAS SEEN AND EXAMINED BY ME WITH THE HOUSESTAFF AND STROKE TEAM DURING MORNING ROUNDS.   HPI:  52F R-handed PMHx RF s/p valve transplant and Afib (on Eliquis; last dose at 1700) who initially presented to [facility] ED with R hand weakness and sudden onset of "not able to talk." Per family, pt was generally feeling unwell all day and tonight, when pt was sitting at the dinner table with her , he noticed that pt was unresponsive and not moving her right hand. Pt was immediately taken to [facility] ED where code stroke was activated. NIHSS of 18 for aphasia and Right hemiparesis. LKN 1907 9/28/22. mRS 0. Pt was not a TPA candidate due to INR 1.97, PTT 39.3. CTA showed Left MCA occlusion in the M2 distribution prompting immediate transfer to [facility] for mechanical thrombectomy.  (29 Sep 2022 00:41)      SUBJECTIVE: No events overnight.  No new neurologic complaints.      MEDICATIONS  (STANDING):  apixaban 5 milliGRAM(s) Oral every 12 hours  atorvastatin 80 milliGRAM(s) Oral at bedtime  chlorhexidine 4% Liquid 1 Application(s) Topical daily  polyethylene glycol 3350 17 Gram(s) Oral two times a day  senna 2 Tablet(s) Oral at bedtime  sotalol 80 milliGRAM(s) Oral every 12 hours      PHYSICAL EXAM:   Vital Signs Last 24 Hrs  T(C): 37.1 (01 Oct 2022 10:00), Max: 37.6 (01 Oct 2022 06:00)  T(F): 98.7 (01 Oct 2022 10:00), Max: 99.7 (01 Oct 2022 06:00)  HR: 52 (01 Oct 2022 14:00) (50 - 65)  BP: 145/70 (01 Oct 2022 14:00) (128/65 - 145/70)  BP(mean): 105 (01 Oct 2022 12:00) (82 - 105)  RR: 16 (01 Oct 2022 12:00) (10 - 19)  SpO2: 95% (01 Oct 2022 12:00) (95% - 100%)    General: No acute distress  HEENT: EOM intact, visual fields full  Abdomen: Soft, nontender, nondistended   Extremities: No edema    NEUROLOGICAL EXAM:  Mental status: Awake, alert, expressive >receptive aphasia, follows simple commands inconsistently.   Cranial Nerves: No facial asymmetry, no nystagmus, Motor exam: Normal tone, 4/5 proximal RUE but 0/5 wrist and hand, 4/5 RLE, 5/5 LUE, 5/5 LLE  Sensation: Intact to light touch   Coordination/ Gait: Not assessed     LABS:                        11.7   7.14  )-----------( 168      ( 01 Oct 2022 07:18 )             38.0    10-01    138  |  104  |  13  ----------------------------<  84  4.1   |  20<L>  |  0.86    Ca    9.0      01 Oct 2022 07:12  Phos  3.3     09-29  Mg     2.2     09-29      IMAGING: Reviewed by me.     MR Head 9/30/2022: No acute left MCA territorial infarct without significant change.  Additional subcentimeter acute infarct left occipital lobe.  No acute intracranial hemorrhage.    CT Head 9/30/2022: Interval demarcation of left lateral frontoparietal acute infarction. No loren hemorrhagic transformation.    CT Head 9/29/2022: Evolving infarction the left MCA territory. There is new patchy hyperintense signal along the anterior and posterior margins of the infarcted territory which may be related to contrast staining versus interval hemorrhagic transformation.    CT  head 9/28/2022 at 2149: No acute intra-cranial hemorrhage, mass effect, or midline shift. Degraded by motion.    CTA head and neck 9/28/2022:  NO HEMODYNAMIC SIGNIFICANT NARROWING WITHIN THE NECK. QUESTION OF A SMALL WEB WITHIN THE LEFT CAROTID BULB.   THERE IS AN ANTERIOR DIVISION LEFT MCA M2 BRANCH OCCLUSION.   MODERATE PERFUSION DEFECT LEFT FRONTAL PARIETAL REGION WITH A CORE INFARCT OF 29 ML AND PENUMBRA OF 1 ML         THE PATIENT WAS SEEN AND EXAMINED BY ME WITH THE HOUSESTAFF AND STROKE TEAM DURING MORNING ROUNDS.   HPI:  52F R-handed PMHx RF s/p valve transplant and Afib (on Eliquis; last dose at 1700) who initially presented to [facility] ED with R hand weakness and sudden onset of "not able to talk." Per family, pt was generally feeling unwell all day and tonight, when pt was sitting at the dinner table with her , he noticed that pt was unresponsive and not moving her right hand. Pt was immediately taken to [facility] ED where code stroke was activated. NIHSS of 18 for aphasia and Right hemiparesis. LKN 1907 9/28/22. mRS 0. Pt was not a TPA candidate due to INR 1.97, PTT 39.3. CTA showed Left MCA occlusion in the M2 distribution prompting immediate transfer to [facility] for mechanical thrombectomy.  (29 Sep 2022 00:41)      SUBJECTIVE: No events overnight.  No new neurologic complaints.      MEDICATIONS  (STANDING):  apixaban 5 milliGRAM(s) Oral every 12 hours  atorvastatin 80 milliGRAM(s) Oral at bedtime  chlorhexidine 4% Liquid 1 Application(s) Topical daily  polyethylene glycol 3350 17 Gram(s) Oral two times a day  senna 2 Tablet(s) Oral at bedtime  sotalol 80 milliGRAM(s) Oral every 12 hours      PHYSICAL EXAM:   Vital Signs Last 24 Hrs  T(C): 37.1 (01 Oct 2022 10:00), Max: 37.6 (01 Oct 2022 06:00)  T(F): 98.7 (01 Oct 2022 10:00), Max: 99.7 (01 Oct 2022 06:00)  HR: 52 (01 Oct 2022 14:00) (50 - 65)  BP: 145/70 (01 Oct 2022 14:00) (128/65 - 145/70)  BP(mean): 105 (01 Oct 2022 12:00) (82 - 105)  RR: 16 (01 Oct 2022 12:00) (10 - 19)  SpO2: 95% (01 Oct 2022 12:00) (95% - 100%)    General: No acute distress  HEENT: EOM intact, visual fields full  Abdomen: Soft, nontender, nondistended   Extremities: No edema    NEUROLOGICAL EXAM:  Mental status: Awake, alert, expressive >receptive aphasia, follows simple commands inconsistently.   Cranial Nerves: Right facial droop, no nystagmus, Motor exam: Normal tone, 4/5 proximal RUE but 0/5 wrist and hand , 4/5 RLE, 5/5 LUE, 5/5 LLE  Sensation: Intact to light touch   Coordination/ Gait: Not assessed     LABS:                        11.7   7.14  )-----------( 168      ( 01 Oct 2022 07:18 )             38.0    10-01    138  |  104  |  13  ----------------------------<  84  4.1   |  20<L>  |  0.86    Ca    9.0      01 Oct 2022 07:12  Phos  3.3     09-29  Mg     2.2     09-29      IMAGING: Reviewed by me.     MR Head 9/30/2022: No acute left MCA territorial infarct without significant change.  Additional subcentimeter acute infarct left occipital lobe.  No acute intracranial hemorrhage.    CT Head 9/30/2022: Interval demarcation of left lateral frontoparietal acute infarction. No loren hemorrhagic transformation.    CT Head 9/29/2022: Evolving infarction the left MCA territory. There is new patchy hyperintense signal along the anterior and posterior margins of the infarcted territory which may be related to contrast staining versus interval hemorrhagic transformation.    CT  head 9/28/2022 at 2149: No acute intra-cranial hemorrhage, mass effect, or midline shift. Degraded by motion.    CTA head and neck 9/28/2022:  NO HEMODYNAMIC SIGNIFICANT NARROWING WITHIN THE NECK. QUESTION OF A SMALL WEB WITHIN THE LEFT CAROTID BULB.   THERE IS AN ANTERIOR DIVISION LEFT MCA M2 BRANCH OCCLUSION.   MODERATE PERFUSION DEFECT LEFT FRONTAL PARIETAL REGION WITH A CORE INFARCT OF 29 ML AND PENUMBRA OF 1 ML

## 2022-10-01 NOTE — PROGRESS NOTE ADULT - ASSESSMENT
52F R-handed PMHx RF s/p valve transplant and Afib  who initially presented to [facility] ED with R hand weakness and sudden onset of "not able to talk." Code stroke activated at [facility] ED. NIHSS 18. LKN 1907 9/28/22. mRS 0.   Pt was not a tPA candidate due to elevated INR but a thrombectomy candidate due to LVO seen on CTA H/N.  Pt now s/p thrombectomy.     R hemiparesis, hemineglect, and aphasia likely 2/2 Left brain dysfunction due to Left MCA M2 segment occlusion. Mechanism likely cardio-embolic i/s/o known Afib.   heme eval ? hypercoag    a/c as per nsicu      f/u cts noted  echo noted  on regular diet  [] Telemonitoring; Neurochecks and Vital signs per unit protocol    [] PT/OT   cards f/u noted

## 2022-10-01 NOTE — PROGRESS NOTE ADULT - SUBJECTIVE AND OBJECTIVE BOX
DATE OF SERVICE: 10-01-22 @ 12:43  CHIEF COMPLAINT:Patient is a 52y old  Female who presents with a chief complaint of Left MCA M2 segment occlusion (01 Oct 2022 10:50)    	        PAST MEDICAL & SURGICAL HISTORY:  Rheumatic fever  as a teen, s/p mitral valve repair 1991      TIA (transient ischemic attack)  on Aspirin      Afib      S/P mitral valve repair  1991      H/O right knee surgery      S/P MVR (mitral valve replacement)  Re op MVR (T)              REVIEW OF SYSTEMS:  CONSTITUTIONAL: No fever, weight loss, or fatigue  EYES: No eye pain, visual disturbances, or discharge  NECK: No pain or stiffness  RESPIRATORY: No cough, wheezing, chills or hemoptysis; No Shortness of Breath  CARDIOVASCULAR: No chest pain, palpitations, passing out, dizziness, or leg swelling  GASTROINTESTINAL: No abdominal or epigastric pain. No nausea, vomiting, or hematemesis; No diarrhea or constipation. No melena or hematochezia.  GENITOURINARY: No dysuria, frequency, hematuria, or incontinence  NEUROLOGICAL: No headaches, memory loss, loss of strength, numbness, or tremors  SKIN: No itching, burning, rashes, or lesions   LYMPH Nodes: No enlarged glands  ENDOCRINE: No heat or cold intolerance; No hair loss  MUSCULOSKELETAL: No joint pain or swelling; No muscle, back, or extremity pain    Medications:  MEDICATIONS  (STANDING):  apixaban 5 milliGRAM(s) Oral every 12 hours  atorvastatin 80 milliGRAM(s) Oral at bedtime  chlorhexidine 4% Liquid 1 Application(s) Topical daily  polyethylene glycol 3350 17 Gram(s) Oral two times a day  senna 2 Tablet(s) Oral at bedtime  sotalol 80 milliGRAM(s) Oral every 12 hours    MEDICATIONS  (PRN):  acetaminophen     Tablet .. 1000 milliGRAM(s) Oral every 6 hours PRN Temp greater or equal to 38C (100.4F), Mild Pain (1 - 3)    	    PHYSICAL EXAM:  T(C): 37.1 (10-01-22 @ 10:00), Max: 37.6 (10-01-22 @ 06:00)  HR: 50 (10-01-22 @ 12:00) (50 - 65)  BP: 142/93 (10-01-22 @ 12:00) (111/80 - 144/75)  RR: 16 (10-01-22 @ 12:00) (10 - 19)  SpO2: 95% (10-01-22 @ 12:00) (95% - 100%)  Wt(kg): --  I&O's Summary    30 Sep 2022 07:01  -  01 Oct 2022 07:00  --------------------------------------------------------  IN: 1320 mL / OUT: 400 mL / NET: 920 mL        Appearance: Normal	  HEENT:   Normal oral mucosa, PERRL, EOMI	  Lymphatic: No lymphadenopathy  Cardiovascular: Normal S1 S2, No JVD, No murmurs, No edema  Respiratory: Lungs clear to auscultation	  Psychiatry: A & O x 3, Mood & affect appropriate  Gastrointestinal:  Soft, Non-tender, + BS	  Skin: No rashes, No ecchymoses, No cyanosis	  Neurologic: Non-focal  Extremities: Normal range of motion, No clubbing, cyanosis or edema  Vascular: Peripheral pulses palpable 2+ bilaterally    TELEMETRY: 	    ECG:  	  RADIOLOGY:  OTHER: 	  	  LABS:	 	    CARDIAC MARKERS:                                11.7   7.14  )-----------( 168      ( 01 Oct 2022 07:18 )             38.0     10-01    138  |  104  |  13  ----------------------------<  84  4.1   |  20<L>  |  0.86    Ca    9.0      01 Oct 2022 07:12  Phos  3.3     09-29  Mg     2.2     09-29      proBNP:   Lipid Profile:   HgA1c:   TSH:     	         DATE OF SERVICE: 10-01-22 @ 12:43  CHIEF COMPLAINT:Patient is a 52y old  Female who presents with a chief complaint of Left MCA M2 segment occlusion (01 Oct 2022 10:50)    	        PAST MEDICAL & SURGICAL HISTORY:  Rheumatic fever  as a teen, s/p mitral valve repair 1991      TIA (transient ischemic attack)  on Aspirin      Afib      S/P mitral valve repair  1991      H/O right knee surgery      S/P MVR (mitral valve replacement)  Re op MVR (T)              aphasic  no cp or sob  no chills    Medications:  MEDICATIONS  (STANDING):  apixaban 5 milliGRAM(s) Oral every 12 hours  atorvastatin 80 milliGRAM(s) Oral at bedtime  chlorhexidine 4% Liquid 1 Application(s) Topical daily  polyethylene glycol 3350 17 Gram(s) Oral two times a day  senna 2 Tablet(s) Oral at bedtime  sotalol 80 milliGRAM(s) Oral every 12 hours    MEDICATIONS  (PRN):  acetaminophen     Tablet .. 1000 milliGRAM(s) Oral every 6 hours PRN Temp greater or equal to 38C (100.4F), Mild Pain (1 - 3)    	    PHYSICAL EXAM:  T(C): 37.1 (10-01-22 @ 10:00), Max: 37.6 (10-01-22 @ 06:00)  HR: 50 (10-01-22 @ 12:00) (50 - 65)  BP: 142/93 (10-01-22 @ 12:00) (111/80 - 144/75)  RR: 16 (10-01-22 @ 12:00) (10 - 19)  SpO2: 95% (10-01-22 @ 12:00) (95% - 100%)  Wt(kg): --  I&O's Summary    30 Sep 2022 07:01  -  01 Oct 2022 07:00  --------------------------------------------------------  IN: 1320 mL / OUT: 400 mL / NET: 920 mL        facial drooo  HEENT:   Normal oral mucosa,  Cardiovascular: Normal S1 S2,   Respiratory: Lungs clear to auscultation	  Psychiatry: A & O   Gastrointestinal:  Soft, Non-tender, + BS	  	  Neurologic: r sided weakess    TELEMETRY: 	    ECG:  	  RADIOLOGY:  OTHER: 	  	  LABS:	 	    CARDIAC MARKERS:                                11.7   7.14  )-----------( 168      ( 01 Oct 2022 07:18 )             38.0     10-01    138  |  104  |  13  ----------------------------<  84  4.1   |  20<L>  |  0.86    Ca    9.0      01 Oct 2022 07:12  Phos  3.3     09-29  Mg     2.2     09-29      proBNP:   Lipid Profile:   HgA1c:   TSH:

## 2022-10-01 NOTE — PROGRESS NOTE ADULT - ASSESSMENT
ASSESSMENT:     52F R-handed PMHx RF s/p valve transplant and Afib (on Eliquis; last dose at 1700) who initially presented to [facility] ED with R hand weakness and sudden onset of "not able to talk." Code stroke activated at [facility] ED. NIHSS 18. LKN 1907 9/28/22. mRS 0. Pt was not a tPA candidate due to elevated INR but a thrombectomy candidate due to LVO seen on CTA H/N. Pt transferred to [facility]. Underwent successful TICI 2c recanalization of left M2, still with significant aphasia motor exam, improving.  On re-exam, pt NIHSS 14. Vitals stable.    IMPRESSION: R hemiparesis, hemineglect, and expressive aphasia likely 2/2 Left brain dysfunction due to Left MCA M2 segment occlusion. Mechanism likely cardio-embolic i/s/o known Afib.      NEURO: Neuro exam improving. Continue close monitoring for neurologic deterioration, permissive HTN,  started on high dose statin, titrate statin to LDL goal less than 70, MRI Brain w/o as well as CTA head and neck as above.  Physical therapy/OT. Speech & Swallow  eval/treatment.     ANTITHROMBOTIC THERAPY: Restarted on Eliquis 5 mg BID     PULMONARY: CXR clear, protecting airway, saturating well     CARDIOVASCULAR: TTE 9/29 EF 69%, Bioprosthetic mitral valve replacement. The valve is well seated.  Minimal mitral transvalvular regurgitation. No mitral paravalvular regurgitation.  Normal left ventricular systolic function. No segmental wall motion abnormalities. No left ventricular thrombus. Cardiac monitoring, h/o AFib - Cardiology consult appreciated. Sotalol restarted, continue Eliquis 5 mg BID for now.          `                  SBP goal: <160     GASTROINTESTINAL: S&S eval completed.      Diet: Regular with thins     RENAL: BUN/Cr stable, good urine output      Na Goal: Greater than 135     Trotter:    HEMATOLOGY: H/H stable, Platelets 168.  Hypercoag labs ordered       DVT ppx: Eliquis 5mg BID     ID: afebrile, no leukocytosis     OTHER:     DISPOSITION: Rehab or home depending on PT eval once stable and workup is complete      CORE MEASURES:        Admission NIHSS:      TPA: [] YES [X] NO      LDL/HDL: 103/62     Depression Screen:      Statin Therapy: Yes      Dysphagia Screen: [x] PASS [] FAIL     Smoking [] YES [x] NO      Afib [x] YES [] NO     Stroke Education [] YES [] NO    Obtain screening lower extremity venous ultrasound in patients who meet 1 or more of the following criteria as patient is high risk for DVT/PE on admission:   [] History of DVT/PE  []Hypercoagulable states (Factor V Leiden, Cancer, OCP, etc. )  []Prolonged immobility (hemiplegia/hemiparesis/post operative or any other extended immobilization)  [] Transferred from outside facility (Rehab or Long term care)  [] Age </= to 50 ASSESSMENT:     52F R-handed PMHx RF s/p valve transplant and Afib (on Eliquis; last dose at 1700) who initially presented to [facility] ED with R hand weakness and sudden onset of "not able to talk." Code stroke activated at [facility] ED. NIHSS 18. LKN 1907 9/28/22. mRS 0. Pt was not a tPA candidate due to elevated INR but a thrombectomy candidate due to LVO seen on CTA H/N. Pt transferred to [facility]. Underwent successful TICI 2c recanalization of left M2, still with significant aphasia motor exam, improving.  On re-exam, pt NIHSS 14. Vitals stable.    IMPRESSION: R hemiparesis, hemineglect, and expressive aphasia likely 2/2 Left brain dysfunction due to Left MCA M2 segment occlusion. Mechanism likely cardio-embolic i/s/o known Afib.      NEURO: Neuro exam improving. Continue close monitoring for neurologic deterioration, gradual normotension goal <140/90,  started on high dose statin, titrate statin to LDL goal less than 70, MRI Brain w/o as well as CTA head and neck as above.  Physical therapy/OT. Speech & Swallow  eval/treatment.     ANTITHROMBOTIC THERAPY: Restarted on Eliquis 5 mg BID     PULMONARY: CXR clear, protecting airway, saturating well     CARDIOVASCULAR: TTE 9/29 EF 69%, Bioprosthetic mitral valve replacement. The valve is well seated.  Minimal mitral transvalvular regurgitation. No mitral paravalvular regurgitation.  Normal left ventricular systolic function. No segmental wall motion abnormalities. No left ventricular thrombus. Cardiac monitoring, h/o AFib - Cardiology consult appreciated. Sotalol restarted, continue Eliquis 5 mg BID for now.          `                  SBP goal: <160     GASTROINTESTINAL: S&S eval completed.      Diet: Regular with thins     RENAL: BUN/Cr stable, good urine output      Na Goal: Greater than 135     Trotter:    HEMATOLOGY: H/H stable, Platelets 168.  Hypercoag labs ordered       DVT ppx: Eliquis 5mg BID     ID: afebrile, no leukocytosis     OTHER:     DISPOSITION: Rehab or home depending on PT eval once stable and workup is complete      CORE MEASURES:        Admission NIHSS:      TPA: [] YES [X] NO      LDL/HDL: 103/62     Depression Screen:      Statin Therapy: Yes      Dysphagia Screen: [x] PASS [] FAIL     Smoking [] YES [x] NO      Afib [x] YES [] NO     Stroke Education [] YES [] NO    Obtain screening lower extremity venous ultrasound in patients who meet 1 or more of the following criteria as patient is high risk for DVT/PE on admission:   [] History of DVT/PE  []Hypercoagulable states (Factor V Leiden, Cancer, OCP, etc. )  []Prolonged immobility (hemiplegia/hemiparesis/post operative or any other extended immobilization)  [] Transferred from outside facility (Rehab or Long term care)  [] Age </= to 50

## 2022-10-02 LAB
CRP SERPL-MCNC: 8 MG/L — HIGH (ref 0–4)
SARS-COV-2 RNA SPEC QL NAA+PROBE: SIGNIFICANT CHANGE UP

## 2022-10-02 PROCEDURE — 99233 SBSQ HOSP IP/OBS HIGH 50: CPT

## 2022-10-02 RX ADMIN — CHLORHEXIDINE GLUCONATE 1 APPLICATION(S): 213 SOLUTION TOPICAL at 11:18

## 2022-10-02 RX ADMIN — Medication 80 MILLIGRAM(S): at 16:58

## 2022-10-02 RX ADMIN — APIXABAN 5 MILLIGRAM(S): 2.5 TABLET, FILM COATED ORAL at 06:13

## 2022-10-02 RX ADMIN — Medication 80 MILLIGRAM(S): at 06:05

## 2022-10-02 RX ADMIN — APIXABAN 5 MILLIGRAM(S): 2.5 TABLET, FILM COATED ORAL at 16:58

## 2022-10-02 RX ADMIN — POLYETHYLENE GLYCOL 3350 17 GRAM(S): 17 POWDER, FOR SOLUTION ORAL at 05:59

## 2022-10-02 RX ADMIN — ATORVASTATIN CALCIUM 80 MILLIGRAM(S): 80 TABLET, FILM COATED ORAL at 21:05

## 2022-10-02 NOTE — PROGRESS NOTE ADULT - ASSESSMENT
52F R-handed PMHx RF s/p valve transplant and Afib  who initially presented to [facility] ED with R hand weakness and sudden onset of "not able to talk." Code stroke activated at [facility] ED. NIHSS 18. LKN 1907 9/28/22. mRS 0.   Pt was not a tPA candidate due to elevated INR but a thrombectomy candidate due to LVO seen on CTA H/N.  Pt now s/p thrombectomy.     R hemiparesis, hemineglect, and aphasia likely 2/2 Left brain dysfunction due to Left MCA M2 segment occlusion. Mechanism likely cardio-embolic i/s/o known Afib.   heme eval ? hypercoag    a/c as per nsicu    a ib  meds as per cards       echo noted  on regular diet  [] Telemonitoring; Neurochecks and Vital signs per unit protocol    [] PT/OT   cards f/u noted

## 2022-10-02 NOTE — PATIENT PROFILE ADULT - FALL HARM RISK - HARM RISK INTERVENTIONS

## 2022-10-02 NOTE — PROGRESS NOTE ADULT - SUBJECTIVE AND OBJECTIVE BOX
DATE OF SERVICE: 10-02-22 @ 11:40    Subjective: Patient seen and examined. No new events except as noted.     SUBJECTIVE/ROS:  nad      MEDICATIONS:  MEDICATIONS  (STANDING):  apixaban 5 milliGRAM(s) Oral every 12 hours  atorvastatin 80 milliGRAM(s) Oral at bedtime  chlorhexidine 4% Liquid 1 Application(s) Topical daily  polyethylene glycol 3350 17 Gram(s) Oral two times a day  senna 2 Tablet(s) Oral at bedtime  sotalol 80 milliGRAM(s) Oral every 12 hours      PHYSICAL EXAM:  T(C): 37 (10-02-22 @ 08:00), Max: 37.1 (10-01-22 @ 20:00)  HR: 58 (10-02-22 @ 10:00) (50 - 65)  BP: 128/72 (10-02-22 @ 10:00) (125/66 - 169/74)  RR: 15 (10-02-22 @ 10:00) (15 - 23)  SpO2: 99% (10-02-22 @ 10:00) (95% - 100%)  Wt(kg): --  I&O's Summary    01 Oct 2022 07:01  -  02 Oct 2022 07:00  --------------------------------------------------------  IN: 100 mL / OUT: 0 mL / NET: 100 mL    02 Oct 2022 07:01  -  02 Oct 2022 11:40  --------------------------------------------------------  IN: 170 mL / OUT: 0 mL / NET: 170 mL            JVP: Normal  Neck: supple  Lung: clear   CV: S1 S2 , Murmur:  Abd: soft  Ext: No edema  neuro: Awake / alert  Psych: flat affect  Skin: normal``    LABS/DATA:    CARDIAC MARKERS:                                11.7   7.14  )-----------( 168      ( 01 Oct 2022 07:18 )             38.0     10-01    138  |  104  |  13  ----------------------------<  84  4.1   |  20<L>  |  0.86    Ca    9.0      01 Oct 2022 07:12      proBNP:   Lipid Profile:   HgA1c:   TSH:     TELE:  EKG:

## 2022-10-02 NOTE — PROGRESS NOTE ADULT - SUBJECTIVE AND OBJECTIVE BOX
DATE OF SERVICE: 10-02-22 @ 13:07  CHIEF COMPLAINT:Patient is a 52y old  Female who presents with a chief complaint of Left MCA M2 segment occlusion (02 Oct 2022 12:39)    	        PAST MEDICAL & SURGICAL HISTORY:  Rheumatic fever  as a teen, s/p mitral valve repair 1991      TIA (transient ischemic attack)  on Aspirin      Afib      S/P mitral valve repair  1991      H/O right knee surgery      S/P MVR (mitral valve replacement)  Re op MVR (T)              REVIEW OF SYSTEMS:  no new complaints     Medications:  MEDICATIONS  (STANDING):  apixaban 5 milliGRAM(s) Oral every 12 hours  atorvastatin 80 milliGRAM(s) Oral at bedtime  chlorhexidine 4% Liquid 1 Application(s) Topical daily  polyethylene glycol 3350 17 Gram(s) Oral two times a day  senna 2 Tablet(s) Oral at bedtime  sotalol 80 milliGRAM(s) Oral every 12 hours    MEDICATIONS  (PRN):  acetaminophen     Tablet .. 1000 milliGRAM(s) Oral every 6 hours PRN Temp greater or equal to 38C (100.4F), Mild Pain (1 - 3)    	    PHYSICAL EXAM:  T(C): 36.7 (10-02-22 @ 12:00), Max: 37.1 (10-01-22 @ 20:00)  HR: 59 (10-02-22 @ 12:00) (52 - 65)  BP: 109/86 (10-02-22 @ 12:00) (109/86 - 169/74)  RR: 12 (10-02-22 @ 12:00) (12 - 23)  SpO2: 99% (10-02-22 @ 12:00) (98% - 100%)  Wt(kg): --  I&O's Summary    01 Oct 2022 07:01  -  02 Oct 2022 07:00  --------------------------------------------------------  IN: 100 mL / OUT: 0 mL / NET: 100 mL    02 Oct 2022 07:01  -  02 Oct 2022 13:07  --------------------------------------------------------  IN: 290 mL / OUT: 0 mL / NET: 290 mL        Appearance: Normal	  HEENT:   Normal oral mucosa, PERRL, EOMI	  Lymphatic: No lymphadenopathy  Cardiovascular: Normal S1 S2, No JVD, No murmurs, No edema  Respiratory: Lungs clear to auscultation	  Psychiatry: A & O x 3, Mood & affect appropriate  Soft, Non-tender, + BS	  Skin: No rashes, No ecchymoses, No cyanosis	  Neurologic: aphasic  r sided weakness    TELEMETRY: 	    ECG:  	  RADIOLOGY:  OTHER: 	  	  LABS:	 	    CARDIAC MARKERS:                                11.7   7.14  )-----------( 168      ( 01 Oct 2022 07:18 )             38.0     10-01    138  |  104  |  13  ----------------------------<  84  4.1   |  20<L>  |  0.86    Ca    9.0      01 Oct 2022 07:12      proBNP:   Lipid Profile:   HgA1c:   TSH:

## 2022-10-02 NOTE — PROGRESS NOTE ADULT - SUBJECTIVE AND OBJECTIVE BOX
THE PATIENT WAS SEEN AND EXAMINED BY ME WITH THE HOUSESTAFF AND STROKE TEAM DURING MORNING ROUNDS.   HPI:  52F R-handed PMHx RF s/p valve transplant and Afib (on Eliquis; last dose at 1700) who initially presented to [facility] ED with R hand weakness and sudden onset of "not able to talk." Per family, pt was generally feeling unwell all day and tonight, when pt was sitting at the dinner table with her , he noticed that pt was unresponsive and not moving her right hand. Pt was immediately taken to [facility] ED where code stroke was activated. NIHSS of 18 for aphasia and Right hemiparesis. LKN 1907 9/28/22. mRS 0. Pt was not a TPA candidate due to INR 1.97, PTT 39.3. CTA showed Left MCA occlusion in the M2 distribution prompting immediate transfer to [facility] for mechanical thrombectomy.  (29 Sep 2022 00:41)      SUBJECTIVE: No events overnight.  No new neurologic complaints.      MEDICATIONS  (STANDING):  apixaban 5 milliGRAM(s) Oral every 12 hours  atorvastatin 80 milliGRAM(s) Oral at bedtime  chlorhexidine 4% Liquid 1 Application(s) Topical daily  polyethylene glycol 3350 17 Gram(s) Oral two times a day  senna 2 Tablet(s) Oral at bedtime  sotalol 80 milliGRAM(s) Oral every 12 hours    MEDICATIONS  (PRN):  acetaminophen     Tablet .. 1000 milliGRAM(s) Oral every 6 hours PRN Temp greater or equal to 38C (100.4F), Mild Pain (1 - 3)      PHYSICAL EXAM:   Vital Signs Last 24 Hrs  T(C): 36.7 (02 Oct 2022 12:00), Max: 37.1 (01 Oct 2022 20:00)  T(F): 98.1 (02 Oct 2022 12:00), Max: 98.7 (01 Oct 2022 20:00)  HR: 59 (02 Oct 2022 12:00) (52 - 65)  BP: 109/86 (02 Oct 2022 12:00) (109/86 - 169/74)  BP(mean): 94 (02 Oct 2022 12:00) (84 - 103)  RR: 12 (02 Oct 2022 12:00) (12 - 23)  SpO2: 99% (02 Oct 2022 12:00) (98% - 100%)      General: No acute distress  HEENT: EOM intact, visual fields full  Abdomen: Soft, nontender, nondistended   Extremities: No edema    NEUROLOGICAL EXAM:  Mental status: Awake, alert, expressive >receptive aphasia, able to repeat a few words but inconsistently " thank you" " happy birthday", follows simple commands inconsistently.   Cranial Nerves: Right facial droop, no nystagmus, Motor exam: Normal tone, 4/5 proximal RUE but 0/5 wrist and hand , 4/5 RLE, 5/5 LUE, 5/5 LLE  Sensation: Intact to light touch   Coordination/ Gait: Not assessed     LABS:                                   11.7   7.14  )-----------( 168      ( 01 Oct 2022 07:18 )             38.0     10-01    138  |  104  |  13  ----------------------------<  84  4.1   |  20<L>  |  0.86    Ca    9.0      01 Oct 2022 07:12    anticardiolipin neg  homocysteine 17.8     IMAGING: Reviewed by me.     MR Head 9/30/2022: No acute left MCA territorial infarct without significant change.  Additional subcentimeter acute infarct left occipital lobe.  No acute intracranial hemorrhage.    CT Head 9/30/2022: Interval demarcation of left lateral frontoparietal acute infarction. No loren hemorrhagic transformation.    CT Head 9/29/2022: Evolving infarction the left MCA territory. There is new patchy hyperintense signal along the anterior and posterior margins of the infarcted territory which may be related to contrast staining versus interval hemorrhagic transformation.    CT  head 9/28/2022 at 2149: No acute intra-cranial hemorrhage, mass effect, or midline shift. Degraded by motion.    CTA head and neck 9/28/2022:  NO HEMODYNAMIC SIGNIFICANT NARROWING WITHIN THE NECK. QUESTION OF A SMALL WEB WITHIN THE LEFT CAROTID BULB.   THERE IS AN ANTERIOR DIVISION LEFT MCA M2 BRANCH OCCLUSION.   MODERATE PERFUSION DEFECT LEFT FRONTAL PARIETAL REGION WITH A CORE INFARCT OF 29 ML AND PENUMBRA OF 1 ML         THE PATIENT WAS SEEN AND EXAMINED BY ME WITH THE HOUSESTAFF AND STROKE TEAM DURING MORNING ROUNDS.   HPI:  52F R-handed PMHx RF s/p valve transplant and Afib (on Eliquis; last dose at 1700) who initially presented to [facility] ED with R hand weakness and sudden onset of "not able to talk." Per family, pt was generally feeling unwell all day and tonight, when pt was sitting at the dinner table with her , he noticed that pt was unresponsive and not moving her right hand. Pt was immediately taken to [facility] ED where code stroke was activated. NIHSS of 18 for aphasia and Right hemiparesis. LKN 1907 9/28/22. mRS 0. Pt was not a TPA candidate due to INR 1.97, PTT 39.3. CTA showed Left MCA occlusion in the M2 distribution prompting immediate transfer to [facility] for mechanical thrombectomy.  (29 Sep 2022 00:41)      SUBJECTIVE: No events overnight.  No new neurologic complaints.      MEDICATIONS  (STANDING):  apixaban 5 milliGRAM(s) Oral every 12 hours  atorvastatin 80 milliGRAM(s) Oral at bedtime  chlorhexidine 4% Liquid 1 Application(s) Topical daily  polyethylene glycol 3350 17 Gram(s) Oral two times a day  senna 2 Tablet(s) Oral at bedtime  sotalol 80 milliGRAM(s) Oral every 12 hours    MEDICATIONS  (PRN):  acetaminophen     Tablet .. 1000 milliGRAM(s) Oral every 6 hours PRN Temp greater or equal to 38C (100.4F), Mild Pain (1 - 3)      PHYSICAL EXAM:   Vital Signs Last 24 Hrs  T(C): 36.7 (02 Oct 2022 12:00), Max: 37.1 (01 Oct 2022 20:00)  T(F): 98.1 (02 Oct 2022 12:00), Max: 98.7 (01 Oct 2022 20:00)  HR: 59 (02 Oct 2022 12:00) (52 - 65)  BP: 109/86 (02 Oct 2022 12:00) (109/86 - 169/74)  BP(mean): 94 (02 Oct 2022 12:00) (84 - 103)  RR: 12 (02 Oct 2022 12:00) (12 - 23)  SpO2: 99% (02 Oct 2022 12:00) (98% - 100%)      General: No acute distress  HEENT: EOM intact, visual fields full  Abdomen: Soft, nontender, nondistended   Extremities: No edema    NEUROLOGICAL EXAM:  Mental status: Awake, alert, expressive >receptive aphasia, able to repeat a few words but inconsistently " thank you" " happy birthday", follows simple commands inconsistently.   Cranial Nerves: RHH, Right facial droop, no nystagmus, Motor exam: Normal tone, 4/5 proximal RUE but 0/5 wrist and hand , 4/5 RLE, 5/5 LUE, 5/5 LLE  Sensation: Intact to light touch   Coordination/ Gait: Not assessed     LABS:                                   11.7   7.14  )-----------( 168      ( 01 Oct 2022 07:18 )             38.0     10-01    138  |  104  |  13  ----------------------------<  84  4.1   |  20<L>  |  0.86    Ca    9.0      01 Oct 2022 07:12    anticardiolipin neg  homocysteine 17.8     IMAGING: Reviewed by me.     MR Head 9/30/2022: No acute left MCA territorial infarct without significant change.  Additional subcentimeter acute infarct left occipital lobe.  No acute intracranial hemorrhage.    CT Head 9/30/2022: Interval demarcation of left lateral frontoparietal acute infarction. No loren hemorrhagic transformation.    CT Head 9/29/2022: Evolving infarction the left MCA territory. There is new patchy hyperintense signal along the anterior and posterior margins of the infarcted territory which may be related to contrast staining versus interval hemorrhagic transformation.    CT  head 9/28/2022 at 2149: No acute intra-cranial hemorrhage, mass effect, or midline shift. Degraded by motion.    CTA head and neck 9/28/2022:  NO HEMODYNAMIC SIGNIFICANT NARROWING WITHIN THE NECK. QUESTION OF A SMALL WEB WITHIN THE LEFT CAROTID BULB.   THERE IS AN ANTERIOR DIVISION LEFT MCA M2 BRANCH OCCLUSION.   MODERATE PERFUSION DEFECT LEFT FRONTAL PARIETAL REGION WITH A CORE INFARCT OF 29 ML AND PENUMBRA OF 1 ML

## 2022-10-02 NOTE — PROGRESS NOTE ADULT - ATTENDING COMMENTS
Agree with assessment and plan as detailed above
Agree with plan as stated above. Amendments were done accordingly.
51 yo woman with rheumatic heart disease s/p valve replacement and Afib on eliquis, presented with R sided weakness on 9/28, NIHSS 18, not a candidate for tPA 2/2 Eliquis and abnormal coags, CTA with M2 occlusion with matched core/penumbra but patient within 6 hours window, s/p thrombectomy with TICI2C. Exam improving.     CTH this AM with a moderate size L MCA stroke.     Alert, with severe expressive aphasia, with some receptive aphasia, with slight L gaze preference but able to cross midline, R VF cut, R arm AG with drift, no movement in the hand, R HF and DF 5/5, L side with no drift     Mechanism of stroke is Afib.     CTH tomorrow AM  Hold ASA until head CT tomorrow AM due to concern for possible minimal hemorrhagic conversion   atorvastatin 80mg daily  liberalize SBP to 100-160, cardene PRN  restart home sotalol   Hold Lov ppx until head CT tomorrow AM due to concern for possible minimal hemorrhagic conversion    Faby Nguyen  Neurocritical Care Attending
53 yo woman with rheumatic heart disease s/p valve replacement and Afib on eliquis, presented with R sided weakness, NIHSS 18, not a candidate for tPA 2/2 Eliquis and abnormal coags, CTA with M2 occlusion with matched core/penumbra but patient within 6 hours window, s/p thrombectomy with TICI2C.     Alert, with severe expressive aphasia, with some receptive aphasia, with slight L gaze preference, not able to cross midline, R arm AG with drift, R DF 5/5, L side with no drift   Groin without hematoma    Mechanism of stroke is Afib.     CTH tomorrow AM  Hold ASA until head CT tomorrow AM  start atorvastatin 40mg daily, send LDL and HgA1C  -140, cardene PRN  restart home sotalol   NS at 75cc/hr  Hold Lov ppx until CTH tomorrow     Patient seen and examined by attending on 9/28/2022.    Patient is critically ill due to acute stroke s/p thrombectomy and at high risk for neurological deterioration or death due to: potential complications from acute stroke s/p thrombectomy such as reperfusion hemorrhage, requiring close neurologic monitoring.
MR of the brain shows moderate left ischemic infarct with no hemorrhagic transformation. Will re-start anticoagulation. Rest of treatment plan as above.

## 2022-10-02 NOTE — PROGRESS NOTE ADULT - ASSESSMENT
ASSESSMENT:     52F R-handed PMHx RF s/p valve transplant and Afib (on Eliquis; last dose at 1700) who initially presented to [facility] ED with R hand weakness and sudden onset of "not able to talk." Code stroke activated at [facility] ED. NIHSS 18. LKN 1907 9/28/22. mRS 0. Pt was not a tPA candidate due to elevated INR but a thrombectomy candidate due to LVO seen on CTA H/N. Pt transferred to [facility]. Underwent successful TICI 2c recanalization of left M2, still with significant aphasia motor exam, improving.  On re-exam, pt NIHSS 14. Vitals stable.    IMPRESSION: R hemiparesis, hemineglect, and expressive aphasia likely 2/2 Left brain dysfunction due to Left MCA M2 segment occlusion. Mechanism likely cardio-embolic i/s/o known Afib.      NEURO: Neuro exam improving. Continue close monitoring for neurologic deterioration, gradual normotension goal <140/90,  started on high dose statin, titrate statin to LDL goal less than 70, MRI Brain w/o as well as CTA head and neck as above.  Physical therapy/OT. Speech & Swallow  eval/treatment.     ANTITHROMBOTIC THERAPY: Restarted on Eliquis 5 mg BID     PULMONARY: CXR clear, protecting airway, saturating well     CARDIOVASCULAR: TTE 9/29 EF 69%, Bioprosthetic mitral valve replacement. The valve is well seated.  Minimal mitral transvalvular regurgitation. No mitral paravalvular regurgitation.  Normal left ventricular systolic function. No segmental wall motion abnormalities. No left ventricular thrombus. Cardiac monitoring, h/o AFib - Cardiology consult appreciated and will continue Sotalol, continue Eliquis 5 mg BID for now.          `                  SBP goal: <160     GASTROINTESTINAL: S&S eval completed.      Diet: Regular with thins     RENAL: BUN/Cr stable, good urine output      Na Goal: Greater than 135     Trotter:    HEMATOLOGY: H/H stable, Platelets 168.  Hypercoag labs ordered and pending, anticardiolipin neg     DVT ppx: Eliquis 5mg BID     ID: afebrile, no leukocytosis     OTHER:     DISPOSITION: Rehab or home depending on PT eval once stable and workup is complete      CORE MEASURES:        Admission NIHSS:      TPA: [] YES [X] NO      LDL/HDL: 103/62     Depression Screen:      Statin Therapy: Yes      Dysphagia Screen: [x] PASS [] FAIL     Smoking [] YES [x] NO      Afib [x] YES [] NO     Stroke Education [] YES [] NO    Obtain screening lower extremity venous ultrasound in patients who meet 1 or more of the following criteria as patient is high risk for DVT/PE on admission:   [] History of DVT/PE  []Hypercoagulable states (Factor V Leiden, Cancer, OCP, etc. )  []Prolonged immobility (hemiplegia/hemiparesis/post operative or any other extended immobilization)  [] Transferred from outside facility (Rehab or Long term care)  [] Age </= to 50 ASSESSMENT:     52F R-handed PMHx RF s/p valve transplant and Afib (on Eliquis; last dose at 1700) who initially presented to [facility] ED with R hand weakness and sudden onset of "not able to talk." Code stroke activated at [facility] ED. NIHSS 18. LKN 1907 9/28/22. mRS 0. Pt was not a tPA candidate due to elevated INR but a thrombectomy candidate due to LVO seen on CTA H/N. Pt transferred to [facility]. Underwent successful TICI 2c recanalization of left M2, still with significant aphasia motor exam, improving.  On re-exam, pt NIHSS 14. Vitals stable.    IMPRESSION: R hemiparesis, hemineglect, and expressive aphasia likely 2/2 Left brain dysfunction due to Left MCA M2 segment occlusion. Mechanism likely cardio-embolic i/s/o known Afib.      NEURO: Neuro exam improving. Continue close monitoring for neurologic deterioration, gradual normotension goal <140/90,  started on high dose statin, titrate statin to LDL goal less than 70, MRI Brain w/o as well as CTA head and neck as above.  Physical therapy/OT. Speech & Swallow  eval/treatment.     ANTITHROMBOTIC THERAPY: Restarted on Eliquis 5 mg BID     PULMONARY: CXR clear, protecting airway, saturating well     CARDIOVASCULAR: TTE 9/29 EF 69%, Bioprosthetic mitral valve replacement. The valve is well seated.  Minimal mitral transvalvular regurgitation. No mitral paravalvular regurgitation.  Normal left ventricular systolic function. No segmental wall motion abnormalities. No left ventricular thrombus. Cardiac monitoring, h/o AFib - Cardiology consult appreciated and will continue Sotalol, continue Eliquis 5 mg BID for now.          `                  SBP goal: <160     GASTROINTESTINAL: S&S eval completed.      Diet: Regular with thins     RENAL: BUN/Cr stable, good urine output      Na Goal: Greater than 135     Trotter:    HEMATOLOGY: H/H stable, Platelets 168.  Hypercoag labs ordered and pending, anticardiolipin neg, homocysteine elevated but subtle, CRP 8 needs to be repeated in a none acute setting, will follow other results. LE doppler negative.      DVT ppx: Eliquis 5mg BID     ID: afebrile, no leukocytosis     OTHER:     DISPOSITION: Recommended acute rehab    CORE MEASURES:        Admission NIHSS:      TPA: [] YES [X] NO      LDL/HDL: 103/62     Depression Screen:      Statin Therapy: Yes      Dysphagia Screen: [x] PASS [] FAIL     Smoking [] YES [x] NO      Afib [x] YES [] NO     Stroke Education [] YES [] NO    Obtain screening lower extremity venous ultrasound in patients who meet 1 or more of the following criteria as patient is high risk for DVT/PE on admission:   [] History of DVT/PE  []Hypercoagulable states (Factor V Leiden, Cancer, OCP, etc. )  []Prolonged immobility (hemiplegia/hemiparesis/post operative or any other extended immobilization)  [] Transferred from outside facility (Rehab or Long term care)  [] Age </= to 50

## 2022-10-03 ENCOUNTER — TRANSCRIPTION ENCOUNTER (OUTPATIENT)
Age: 52
End: 2022-10-03

## 2022-10-03 VITALS
RESPIRATION RATE: 18 BRPM | TEMPERATURE: 99 F | SYSTOLIC BLOOD PRESSURE: 114 MMHG | HEART RATE: 84 BPM | OXYGEN SATURATION: 99 % | DIASTOLIC BLOOD PRESSURE: 66 MMHG

## 2022-10-03 LAB
AT III ACT/NOR PPP CHRO: 98 % — SIGNIFICANT CHANGE UP (ref 85–135)
B2 GLYCOPROT1 AB SER QL: NEGATIVE — SIGNIFICANT CHANGE UP
DRVVT 50/50: 42.7 SEC — SIGNIFICANT CHANGE UP
DRVVT SCREEN TO CONFIRM RATIO: SIGNIFICANT CHANGE UP
ERYTHROCYTE [SEDIMENTATION RATE] IN BLOOD: 62 MM/HR — HIGH (ref 0–20)
LA NT DPL PPP QL: 50.5 SEC — SIGNIFICANT CHANGE UP
NORMALIZED SCT PPP-RTO: 0.98 RATIO — SIGNIFICANT CHANGE UP (ref 0–1.16)
NORMALIZED SCT PPP-RTO: SIGNIFICANT CHANGE UP
PROT C ACT/NOR PPP: 107 % — SIGNIFICANT CHANGE UP (ref 74–150)

## 2022-10-03 PROCEDURE — 97110 THERAPEUTIC EXERCISES: CPT

## 2022-10-03 PROCEDURE — 92610 EVALUATE SWALLOWING FUNCTION: CPT

## 2022-10-03 PROCEDURE — C1760: CPT

## 2022-10-03 PROCEDURE — C8929: CPT

## 2022-10-03 PROCEDURE — 83735 ASSAY OF MAGNESIUM: CPT

## 2022-10-03 PROCEDURE — 84100 ASSAY OF PHOSPHORUS: CPT

## 2022-10-03 PROCEDURE — 93005 ELECTROCARDIOGRAM TRACING: CPT

## 2022-10-03 PROCEDURE — 85730 THROMBOPLASTIN TIME PARTIAL: CPT

## 2022-10-03 PROCEDURE — 81241 F5 GENE: CPT

## 2022-10-03 PROCEDURE — 97162 PT EVAL MOD COMPLEX 30 MIN: CPT

## 2022-10-03 PROCEDURE — 84484 ASSAY OF TROPONIN QUANT: CPT

## 2022-10-03 PROCEDURE — 92523 SPEECH SOUND LANG COMPREHEN: CPT

## 2022-10-03 PROCEDURE — U0003: CPT

## 2022-10-03 PROCEDURE — 86147 CARDIOLIPIN ANTIBODY EA IG: CPT

## 2022-10-03 PROCEDURE — 83036 HEMOGLOBIN GLYCOSYLATED A1C: CPT

## 2022-10-03 PROCEDURE — C1894: CPT

## 2022-10-03 PROCEDURE — 81240 F2 GENE: CPT

## 2022-10-03 PROCEDURE — 86140 C-REACTIVE PROTEIN: CPT

## 2022-10-03 PROCEDURE — C9399: CPT

## 2022-10-03 PROCEDURE — 83090 ASSAY OF HOMOCYSTEINE: CPT

## 2022-10-03 PROCEDURE — 99254 IP/OBS CNSLTJ NEW/EST MOD 60: CPT

## 2022-10-03 PROCEDURE — 80076 HEPATIC FUNCTION PANEL: CPT

## 2022-10-03 PROCEDURE — C1757: CPT

## 2022-10-03 PROCEDURE — 97116 GAIT TRAINING THERAPY: CPT

## 2022-10-03 PROCEDURE — 85303 CLOT INHIBIT PROT C ACTIVITY: CPT

## 2022-10-03 PROCEDURE — 86146 BETA-2 GLYCOPROTEIN ANTIBODY: CPT

## 2022-10-03 PROCEDURE — 85610 PROTHROMBIN TIME: CPT

## 2022-10-03 PROCEDURE — 85652 RBC SED RATE AUTOMATED: CPT

## 2022-10-03 PROCEDURE — 61645 PERQ ART M-THROMBECT &/NFS: CPT

## 2022-10-03 PROCEDURE — 85027 COMPLETE CBC AUTOMATED: CPT

## 2022-10-03 PROCEDURE — 84443 ASSAY THYROID STIM HORMONE: CPT

## 2022-10-03 PROCEDURE — 80061 LIPID PANEL: CPT

## 2022-10-03 PROCEDURE — U0005: CPT

## 2022-10-03 PROCEDURE — 84480 ASSAY TRIIODOTHYRONINE (T3): CPT

## 2022-10-03 PROCEDURE — 97166 OT EVAL MOD COMPLEX 45 MIN: CPT

## 2022-10-03 PROCEDURE — C1769: CPT

## 2022-10-03 PROCEDURE — 70450 CT HEAD/BRAIN W/O DYE: CPT

## 2022-10-03 PROCEDURE — 80048 BASIC METABOLIC PNL TOTAL CA: CPT

## 2022-10-03 PROCEDURE — 85300 ANTITHROMBIN III ACTIVITY: CPT

## 2022-10-03 PROCEDURE — 99233 SBSQ HOSP IP/OBS HIGH 50: CPT

## 2022-10-03 PROCEDURE — 84436 ASSAY OF TOTAL THYROXINE: CPT

## 2022-10-03 PROCEDURE — 70551 MRI BRAIN STEM W/O DYE: CPT

## 2022-10-03 PROCEDURE — 36415 COLL VENOUS BLD VENIPUNCTURE: CPT

## 2022-10-03 PROCEDURE — C1887: CPT

## 2022-10-03 PROCEDURE — 85306 CLOT INHIBIT PROT S FREE: CPT

## 2022-10-03 PROCEDURE — 85307 ASSAY ACTIVATED PROTEIN C: CPT

## 2022-10-03 PROCEDURE — 93970 EXTREMITY STUDY: CPT

## 2022-10-03 RX ORDER — SOTALOL HCL 120 MG
1 TABLET ORAL
Qty: 0 | Refills: 0 | DISCHARGE

## 2022-10-03 RX ORDER — RIVAROXABAN 15 MG-20MG
1 KIT ORAL
Qty: 0 | Refills: 0 | DISCHARGE

## 2022-10-03 RX ORDER — SOTALOL HCL 120 MG
1 TABLET ORAL
Qty: 0 | Refills: 0 | DISCHARGE
Start: 2022-10-03

## 2022-10-03 RX ORDER — APIXABAN 2.5 MG/1
1 TABLET, FILM COATED ORAL
Qty: 180 | Refills: 0
Start: 2022-10-03 | End: 2022-12-31

## 2022-10-03 RX ORDER — ATORVASTATIN CALCIUM 80 MG/1
1 TABLET, FILM COATED ORAL
Qty: 60 | Refills: 0
Start: 2022-10-03 | End: 2022-12-01

## 2022-10-03 RX ORDER — PREGABALIN 225 MG/1
0 CAPSULE ORAL
Qty: 0 | Refills: 0 | DISCHARGE

## 2022-10-03 RX ADMIN — CHLORHEXIDINE GLUCONATE 1 APPLICATION(S): 213 SOLUTION TOPICAL at 11:23

## 2022-10-03 RX ADMIN — Medication 80 MILLIGRAM(S): at 04:51

## 2022-10-03 RX ADMIN — APIXABAN 5 MILLIGRAM(S): 2.5 TABLET, FILM COATED ORAL at 04:50

## 2022-10-03 NOTE — PROGRESS NOTE ADULT - SUBJECTIVE AND OBJECTIVE BOX
DATE OF SERVICE: 10-03-22 @ 11:00  CHIEF COMPLAINT:Patient is a 52y old  Female who presents with a chief complaint of Left MCA M2 segment occlusion (03 Oct 2022 09:48)    	        PAST MEDICAL & SURGICAL HISTORY:  Rheumatic fever  as a teen, s/p mitral valve repair 1991      TIA (transient ischemic attack)  on Aspirin      Afib      S/P mitral valve repair  1991      H/O right knee surgery      S/P MVR (mitral valve replacement)  Re op MVR (T)                NECK: No pain or stiffness  RESPIRATORY: No cough, wheezing, chills or hemoptysis; No Shortness of Breath  CARDIOVASCULAR: No chest pain, palpitations, passing out,  GASTROINTESTINAL: No abdominal or epigastric pain. No nausea, vomiting, or hematemesis;   GENITOURINARY: No dysuria, frequency, hematuria, o  NEUROLOGICAL: aphasic  weakness on right    Medications:  MEDICATIONS  (STANDING):  apixaban 5 milliGRAM(s) Oral every 12 hours  atorvastatin 80 milliGRAM(s) Oral at bedtime  chlorhexidine 4% Liquid 1 Application(s) Topical daily  polyethylene glycol 3350 17 Gram(s) Oral two times a day  senna 2 Tablet(s) Oral at bedtime  sotalol 80 milliGRAM(s) Oral every 12 hours    MEDICATIONS  (PRN):  acetaminophen     Tablet .. 1000 milliGRAM(s) Oral every 6 hours PRN Temp greater or equal to 38C (100.4F), Mild Pain (1 - 3)    	    PHYSICAL EXAM:  T(C): 37.1 (10-03-22 @ 09:20), Max: 37.1 (10-03-22 @ 09:20)  HR: 61 (10-03-22 @ 09:20) (55 - 67)  BP: 101/68 (10-03-22 @ 09:20) (101/68 - 144/80)  RR: 18 (10-03-22 @ 09:20) (12 - 19)  SpO2: 99% (10-03-22 @ 09:20) (98% - 99%)  Wt(kg): --  I&O's Summary    02 Oct 2022 07:01  -  03 Oct 2022 07:00  --------------------------------------------------------  IN: 580 mL / OUT: 0 mL / NET: 580 mL        Appearance: Normal	  HEENT:   Normal oral mucosa, PERRL, EOMI	    Cardiovascular: Normal S1 S2, No JVD,   Respiratory: Lungs clear to auscultation	  Psychiatry: A & O   Gastrointestinal:  Soft, Non-tender, + BS	    Neurologic: aphasic  r sided weakness  Extremities: Normal range of motion, No clubbing, cyanosis or edema  Vascular: Peripheral pulses palpable 2+ bilaterally    TELEMETRY: 	    ECG:  	  RADIOLOGY:  OTHER: 	  	  LABS:	 	    CARDIAC MARKERS:                  proBNP:   Lipid Profile:   HgA1c:   TSH:

## 2022-10-03 NOTE — PROGRESS NOTE ADULT - ASSESSMENT
52F R-handed PMHx RF s/p valve transplant and Afib  who initially presented to [facility] ED with R hand weakness and sudden onset of "not able to talk." Code stroke activated at [facility] ED. NIHSS 18. LKN 1907 9/28/22. mRS 0.   Pt was not a tPA candidate due to elevated INR but a thrombectomy candidate due to LVO seen on CTA H/N.  Pt now s/p thrombectomy.     R hemiparesis, hemineglect, and aphasia likely 2/2 Left brain dysfunction due to Left MCA M2 segment occlusion. Mechanism likely cardio-embolic i/s/o known Afib.   heme eval ? hypercoag    a/c as per  neuro    a fib  meds as per cards       echo noted  on regular diet  [] Telemonitoring; Neurochecks and Vital signs per unit protocol    [] PT/OT   cards f/u noted  rehab

## 2022-10-03 NOTE — PROGRESS NOTE ADULT - ASSESSMENT
52F R-handed PMHx RF s/p valve transplant and Afib (on Eliquis; last dose at 1700) who initially presented to [facility] ED with R hand weakness and sudden onset of "not able to talk." Code stroke activated at [facility] ED. NIHSS 18. LKN 1907 9/28/22. mRS 0. Pt was not a tPA candidate due to elevated INR but a thrombectomy candidate due to LVO seen on CTA H/N. Pt transferred to [facility]. Underwent successful TICI 2c recanalization of left M2, still with significant aphasia motor exam, improving.  On re-exam, pt NIHSS 14. Vitals stable.    IMPRESSION: R hemiparesis, hemineglect, and expressive aphasia likely 2/2 Left brain dysfunction due to Left MCA M2 segment occlusion. Mechanism likely cardio-embolic i/s/o known Afib.      NEURO: Neuro exam improving. Continue close monitoring for neurologic deterioration, gradual normotension goal <140/90,  started on high dose statin, titrate statin to LDL goal less than 70, MRI Brain w/o as well as CTA head and neck as above.  Physical therapy/OT recommended AR    ANTITHROMBOTIC THERAPY: Restarted on Eliquis 5 mg BID     PULMONARY: CXR clear, protecting airway, saturating well     CARDIOVASCULAR: TTE 9/29 EF 69%, Bioprosthetic mitral valve replacement. The valve is well seated.  Minimal mitral transvalvular regurgitation. No mitral paravalvular regurgitation.  Normal left ventricular systolic function. No segmental wall motion abnormalities. No left ventricular thrombus. Cardiac monitoring, h/o AFib - Cardiology consult appreciated and will continue Sotalol, continue Eliquis 5 mg BID for now.          `                  SBP goal: <160     GASTROINTESTINAL: S&S eval completed.      Diet: Regular with thins     RENAL: BUN/Cr within normal limits on 10/1, good urine output      Na Goal: Greater than 135     Trotter:    HEMATOLOGY: Hypercoag labs ordered and pending, anticardiolipin neg, homocysteine elevated but subtle, CRP 8 needs to be repeated in a none acute setting, will follow other results. LE doppler negative.      DVT ppx: Eliquis 5mg BID     ID: afebrile, no leukocytosis     OTHER: covid pending for d/c planning    DISPOSITION: Recommended acute rehab, stable for d/c    CORE MEASURES:        Admission NIHSS:      TPA: [] YES [X] NO      LDL/HDL: 103/62     Depression Screen:      Statin Therapy: Yes      Dysphagia Screen: [x] PASS [] FAIL     Smoking [] YES [x] NO      Afib [x] YES [] NO     Stroke Education [] YES [] NO    Obtain screening lower extremity venous ultrasound in patients who meet 1 or more of the following criteria as patient is high risk for DVT/PE on admission:   [] History of DVT/PE  []Hypercoagulable states (Factor V Leiden, Cancer, OCP, etc. )  []Prolonged immobility (hemiplegia/hemiparesis/post operative or any other extended immobilization)  [] Transferred from outside facility (Rehab or Long term care)  [] Age </= to 50     52F R-handed PMHx RF s/p valve transplant and Afib (on Xarelto last dose at 1700) who initially presented to [facility] ED with R hand weakness and sudden onset of "not able to talk." Code stroke activated at [facility] ED. NIHSS 18. LKN 1907 9/28/22. mRS 0. Pt was not a tPA candidate due to elevated INR but a thrombectomy candidate due to LVO seen on CTA H/N. Pt transferred to [facility]. Underwent successful TICI 2c recanalization of left M2, still with significant aphasia motor exam, improving.  On re-exam, pt NIHSS 14. Vitals stable.    IMPRESSION: R hemiparesis, hemineglect, and expressive aphasia likely 2/2 Left brain dysfunction due to Left MCA M2 segment occlusion. Mechanism likely cardio-embolic i/s/o known Afib.      NEURO: Neuro exam improving, gradual normotension goal <140/90,  started on high dose statin, titrate statin to LDL goal less than 70, MRI Brain w/o as well as CTA head and neck as above.  Physical therapy/OT recommended AR    ANTITHROMBOTIC THERAPY: started on Eliquis 5 mg BID (of note just found out that patient was previously taking Xarelto for afib)     PULMONARY: protecting airway, saturating well     CARDIOVASCULAR: TTE 9/29 EF 69%, Bioprosthetic mitral valve replacement. The valve is well seated.  Minimal mitral transvalvular regurgitation. No mitral paravalvular regurgitation.  Normal left ventricular systolic function. No segmental wall motion abnormalities. No left ventricular thrombus. Cardiac monitoring, h/o AFib - Cardiology consult appreciated and will continue Sotalol, continue Eliquis 5 mg BID for now.          `                  SBP goal: <160     GASTROINTESTINAL: S&S eval completed.      Diet: Regular with thins     RENAL: BUN/Cr within normal limits on 10/1, good urine output      Na Goal: Greater than 135     Trotter:    HEMATOLOGY: Hypercoag labs ordered and pending, anticardiolipin neg, homocysteine elevated but subtle, CRP 8 needs to be repeated in a none acute setting, will followup as outpatient. LE doppler negative.      DVT ppx: Eliquis 5mg BID     ID: afebrile, no sign of infection     OTHER: covid neg on 10/2.     DISPOSITION: Recommended acute rehab however her  wants to take her home. Risks discussed by both neurology and PMR team however he would like to take her home. He will provide 24 hr care for her. She does not need equipment for d/c home per .     CORE MEASURES:        Admission NIHSS:      TPA: [] YES [X] NO      LDL/HDL: 103/62     Depression Screen:      Statin Therapy: Yes      Dysphagia Screen: [x] PASS [] FAIL     Smoking [] YES [x] NO      Afib [x] YES [] NO     Stroke Education [] YES [] NO    Obtain screening lower extremity venous ultrasound in patients who meet 1 or more of the following criteria as patient is high risk for DVT/PE on admission:   [] History of DVT/PE  []Hypercoagulable states (Factor V Leiden, Cancer, OCP, etc. )  []Prolonged immobility (hemiplegia/hemiparesis/post operative or any other extended immobilization)  [] Transferred from outside facility (Rehab or Long term care)  [] Age </= to 50

## 2022-10-03 NOTE — PROGRESS NOTE ADULT - SUBJECTIVE AND OBJECTIVE BOX
DATE OF SERVICE: 10-03-22 @ 08:30    Subjective: Patient seen and examined. No new events except as noted.     SUBJECTIVE/ROS:    nad    MEDICATIONS:  MEDICATIONS  (STANDING):  apixaban 5 milliGRAM(s) Oral every 12 hours  atorvastatin 80 milliGRAM(s) Oral at bedtime  chlorhexidine 4% Liquid 1 Application(s) Topical daily  polyethylene glycol 3350 17 Gram(s) Oral two times a day  senna 2 Tablet(s) Oral at bedtime  sotalol 80 milliGRAM(s) Oral every 12 hours      PHYSICAL EXAM:  T(C): 36.7 (10-03-22 @ 04:55), Max: 36.9 (10-02-22 @ 16:44)  HR: 65 (10-03-22 @ 04:55) (55 - 67)  BP: 112/69 (10-03-22 @ 04:55) (107/70 - 144/80)  RR: 18 (10-03-22 @ 04:55) (12 - 19)  SpO2: 98% (10-03-22 @ 04:55) (98% - 99%)  Wt(kg): --  I&O's Summary    02 Oct 2022 07:01  -  03 Oct 2022 07:00  --------------------------------------------------------  IN: 580 mL / OUT: 0 mL / NET: 580 mL            JVP: Normal  Neck: supple  Lung: clear   CV: S1 S2 , Murmur:  Abd: soft  Ext: No edema  neuro: Awake / alert  Psych: flat affect  Skin: normal``    LABS/DATA:    CARDIAC MARKERS:                  proBNP:   Lipid Profile:   HgA1c:   TSH:     TELE:  EKG:

## 2022-10-03 NOTE — DISCHARGE NOTE NURSING/CASE MANAGEMENT/SOCIAL WORK - NSDCPEFALRISK_GEN_ALL_CORE
For information on Fall & Injury Prevention, visit: [email or web address] OR  [email or web address] OR  https://www.cdc.gov/steadi/patient.html

## 2022-10-03 NOTE — PROGRESS NOTE ADULT - SUBJECTIVE AND OBJECTIVE BOX
THE PATIENT WAS SEEN AND EXAMINED BY ME WITH THE HOUSESTAFF AND STROKE TEAM DURING MORNING ROUNDS.   HPI:  52F R-handed PMHx RF s/p valve transplant and Afib (on Eliquis; last dose at 1700) who initially presented to [facility] ED with R hand weakness and sudden onset of "not able to talk." Per family, pt was generally feeling unwell all day and tonight, when pt was sitting at the dinner table with her , he noticed that pt was unresponsive and not moving her right hand. Pt was immediately taken to [facility] ED where code stroke was activated. NIHSS of 18 for aphasia and Right hemiparesis. LKN 1907 9/28/22. mRS 0. Pt was not a TPA candidate due to INR 1.97, PTT 39.3. CTA showed Left MCA occlusion in the M2 distribution prompting immediate transfer to [facility] for mechanical thrombectomy.      SUBJECTIVE: No events overnight.  No new neurologic complaints.  ROS reported negative unless otherwise noted.    acetaminophen     Tablet .. 1000 milliGRAM(s) Oral every 6 hours PRN  apixaban 5 milliGRAM(s) Oral every 12 hours  atorvastatin 80 milliGRAM(s) Oral at bedtime  chlorhexidine 4% Liquid 1 Application(s) Topical daily  polyethylene glycol 3350 17 Gram(s) Oral two times a day  senna 2 Tablet(s) Oral at bedtime  sotalol 80 milliGRAM(s) Oral every 12 hours    PHYSICAL EXAM:   Vital Signs Last 24 Hrs  T(C): 36.7 (03 Oct 2022 04:55), Max: 36.9 (02 Oct 2022 16:44)  T(F): 98 (03 Oct 2022 04:55), Max: 98.4 (02 Oct 2022 16:44)  HR: 65 (03 Oct 2022 04:55) (55 - 67)  BP: 112/69 (03 Oct 2022 04:55) (107/70 - 144/80)  BP(mean): 85 (02 Oct 2022 15:24) (83 - 94)  RR: 18 (03 Oct 2022 04:55) (12 - 19)  SpO2: 98% (03 Oct 2022 04:55) (98% - 99%)    Parameters below as of 03 Oct 2022 04:55  Patient On (Oxygen Delivery Method): room air    General: No acute distress  HEENT: EOM intact, visual fields full  Abdomen: Soft, nontender, nondistended   Extremities: No edema    NEUROLOGICAL EXAM:  Mental status: Awake, alert, expressive >receptive aphasia, able to repeat a few words but inconsistently " thank you" " happy birthday", follows simple commands inconsistently.   Cranial Nerves: RHH, Right facial droop, no nystagmus, Motor exam: Normal tone, 4/5 proximal RUE but 0/5 wrist and hand , 4/5 RLE, 5/5 LUE, 5/5 LLE  Sensation: Intact to light touch   Coordination/ Gait: Not assessed     IMAGING: Reviewed by me.     MR Head 9/30/2022: No acute left MCA territorial infarct without significant change.  Additional subcentimeter acute infarct left occipital lobe.  No acute intracranial hemorrhage.    CT Head 9/30/2022: Interval demarcation of left lateral frontoparietal acute infarction. No loren hemorrhagic transformation.    CT Head 9/29/2022: Evolving infarction the left MCA territory. There is new patchy hyperintense signal along the anterior and posterior margins of the infarcted territory which may be related to contrast staining versus interval hemorrhagic transformation.    CT  head 9/28/2022 at 2149: No acute intra-cranial hemorrhage, mass effect, or midline shift. Degraded by motion.    CTA head and neck 9/28/2022:  NO HEMODYNAMIC SIGNIFICANT NARROWING WITHIN THE NECK. QUESTION OF A SMALL WEB WITHIN THE LEFT CAROTID BULB.   THERE IS AN ANTERIOR DIVISION LEFT MCA M2 BRANCH OCCLUSION.   MODERATE PERFUSION DEFECT LEFT FRONTAL PARIETAL REGION WITH A CORE INFARCT OF 29 ML AND PENUMBRA OF 1 ML     THE PATIENT WAS SEEN AND EXAMINED BY ME WITH THE HOUSESTAFF AND STROKE TEAM DURING MORNING ROUNDS.   HPI:  52F R-handed PMHx RF s/p valve transplant and Afib (on xarelto; last dose at 1700) who initially presented to [facility] ED with R hand weakness and sudden onset of "not able to talk." Per family, pt was generally feeling unwell all day and tonight, when pt was sitting at the dinner table with her , he noticed that pt was unresponsive and not moving her right hand. Pt was immediately taken to [facility] ED where code stroke was activated. NIHSS of 18 for aphasia and Right hemiparesis. LKN 1907 9/28/22. mRS 0. Pt was not a TPA candidate due to INR 1.97, PTT 39.3. CTA showed Left MCA occlusion in the M2 distribution prompting immediate transfer to [facility] for mechanical thrombectomy.      SUBJECTIVE: No events overnight.  No new neurologic complaints.  ROS reported negative unless otherwise noted.    acetaminophen     Tablet .. 1000 milliGRAM(s) Oral every 6 hours PRN  apixaban 5 milliGRAM(s) Oral every 12 hours  atorvastatin 80 milliGRAM(s) Oral at bedtime  chlorhexidine 4% Liquid 1 Application(s) Topical daily  polyethylene glycol 3350 17 Gram(s) Oral two times a day  senna 2 Tablet(s) Oral at bedtime  sotalol 80 milliGRAM(s) Oral every 12 hours    PHYSICAL EXAM:   Vital Signs Last 24 Hrs  T(C): 36.7 (03 Oct 2022 04:55), Max: 36.9 (02 Oct 2022 16:44)  T(F): 98 (03 Oct 2022 04:55), Max: 98.4 (02 Oct 2022 16:44)  HR: 65 (03 Oct 2022 04:55) (55 - 67)  BP: 112/69 (03 Oct 2022 04:55) (107/70 - 144/80)  BP(mean): 85 (02 Oct 2022 15:24) (83 - 94)  RR: 18 (03 Oct 2022 04:55) (12 - 19)  SpO2: 98% (03 Oct 2022 04:55) (98% - 99%)    Parameters below as of 03 Oct 2022 04:55  Patient On (Oxygen Delivery Method): room air    General: No acute distress  HEENT: EOM intact, visual fields full  Abdomen: Soft, nontender, nondistended   Extremities: No edema    NEUROLOGICAL EXAM:  Mental status: Awake, alert, expressive >receptive aphasia, able to repeat a few words but inconsistently " thank you" " happy birthday", follows simple commands inconsistently.   Cranial Nerves: RHH, Right facial droop, no nystagmus, Motor exam: Normal tone, 4/5 proximal RUE but 0/5 wrist and hand , 4/5 RLE, 5/5 LUE, 5/5 LLE  Sensation: Intact to light touch   Coordination/ Gait: Not assessed     IMAGING: Reviewed by me.     MR Head 9/30/2022: No acute left MCA territorial infarct without significant change.  Additional subcentimeter acute infarct left occipital lobe.  No acute intracranial hemorrhage.    CT Head 9/30/2022: Interval demarcation of left lateral frontoparietal acute infarction. No loren hemorrhagic transformation.    CT Head 9/29/2022: Evolving infarction the left MCA territory. There is new patchy hyperintense signal along the anterior and posterior margins of the infarcted territory which may be related to contrast staining versus interval hemorrhagic transformation.    CT  head 9/28/2022 at 2149: No acute intra-cranial hemorrhage, mass effect, or midline shift. Degraded by motion.    CTA head and neck 9/28/2022:  NO HEMODYNAMIC SIGNIFICANT NARROWING WITHIN THE NECK. QUESTION OF A SMALL WEB WITHIN THE LEFT CAROTID BULB.   THERE IS AN ANTERIOR DIVISION LEFT MCA M2 BRANCH OCCLUSION.   MODERATE PERFUSION DEFECT LEFT FRONTAL PARIETAL REGION WITH A CORE INFARCT OF 29 ML AND PENUMBRA OF 1 ML

## 2022-10-03 NOTE — PROGRESS NOTE ADULT - REASON FOR ADMISSION
Left MCA M2 segment occlusion
Acute ischemic stroke
Left MCA M2 segment occlusion

## 2022-10-03 NOTE — DISCHARGE NOTE PROVIDER - NSDCCPCAREPLAN_GEN_ALL_CORE_FT
PRINCIPAL DISCHARGE DIAGNOSIS  Diagnosis: Stroke  Assessment and Plan of Treatment: Ypu had a stroke. Please follow up with neurologist in 1 week. The office will call you to schedule an appointment, if you do not hear from them please call [phone]. Continue taking medications as prescribed. If you were prescribed a statin for your cholesterol please make sure you have your liver enzymes checked with your primary care physician. Monitor your blood pressure. Reduce fat, cholesterol and salt in your diet. Increase intake of fruits and vegetables. Limit alcohol to minimum and do not smoke. You may be at risk for falling, make changes to your home to help you walk easier. Keep up to date on vaccinations.  If you experience any symptoms of facial drooping, slurred speech, arm or leg weakness, severe headache, vision changes or any worsening symptoms, notify provider immediately and return to ER.      SECONDARY DISCHARGE DIAGNOSES  Diagnosis: Chronic atrial fibrillation  Assessment and Plan of Treatment: Please continue to take your medications as prescribed, Low salt, low fat diet, exercise as tolerated. Do not smoke, limit alcohol and caffeine. Monitor Blood pressure and pulse. Follow up with your cardiologist in 1-2 weeks     PRINCIPAL DISCHARGE DIAGNOSIS  Diagnosis: Stroke  Assessment and Plan of Treatment: You had a stroke. Please follow up with neurologist in 1 week. The office will call you to schedule an appointment, if you do not hear from them please call [phone]. Continue taking medications as prescribed. If you were prescribed a statin for your cholesterol please make sure you have your liver enzymes checked with your primary care physician. Monitor your blood pressure. Reduce fat, cholesterol and salt in your diet. Increase intake of fruits and vegetables. Limit alcohol to minimum and do not smoke. You may be at risk for falling, make changes to your home to help you walk easier. Keep up to date on vaccinations.  If you experience any symptoms of facial drooping, slurred speech, arm or leg weakness, severe headache, vision changes or any worsening symptoms, notify provider immediately and return to ER.      SECONDARY DISCHARGE DIAGNOSES  Diagnosis: Chronic atrial fibrillation  Assessment and Plan of Treatment: Please continue to take your medications as prescribed, Low salt, low fat diet, exercise as tolerated. Do not smoke, limit alcohol and caffeine. Monitor Blood pressure and pulse. Follow up with your cardiologist in 1-2 weeks

## 2022-10-03 NOTE — DISCHARGE NOTE NURSING/CASE MANAGEMENT/SOCIAL WORK - PATIENT PORTAL LINK FT
You can access the FollowMyHealth Patient Portal offered by [facility] by registering at the following website: [email or web address]. By joining [employer]s FollowMyHealth portal, you will also be able to view your health information using other applications (apps) compatible with our system.

## 2022-10-03 NOTE — DISCHARGE NOTE PROVIDER - CARE PROVIDER_API CALL
Shantelle Torres (NP; RN)  NP in Family Health  [address], [address]  [place] [address]  Phone: [phone]  Fax: [phone]  Follow Up Time: 2 weeks    Juan Felipe  CARDIOVASCULAR DISEASE  [address], [address]  [place] [address]  Phone: [phone]  Fax: [phone]  Follow Up Time: 2 weeks    Angelique Matthews  HEMATOLOGY  [address]  [place] [address]  Phone: [phone]  Fax: [phone]  Follow Up Time: 1 month

## 2022-10-03 NOTE — DISCHARGE NOTE PROVIDER - HOSPITAL COURSE
52F R-handed PMHx RF s/p valve transplant and Afib (on Eliquis; last dose at 1700) who initially presented to [facility] ED with R hand weakness and sudden onset of "not able to talk." Code stroke activated at [facility] ED. NIHSS 18. LKN 1907 9/28/22. mRS 0. Pt was not a tPA candidate due to elevated INR but a thrombectomy candidate due to LVO seen on CTA H/N. Pt transferred to [facility]. Underwent successful TICI 2c recanalization of left M2, still with significant aphasia motor exam, improving.  On re-exam, pt NIHSS 14. Vitals stable.    IMPRESSION: R hemiparesis, hemineglect, and expressive aphasia likely 2/2 Left brain dysfunction due to Left MCA M2 segment occlusion. Mechanism likely cardio-embolic i/s/o known Afib.      MR Head 9/30/2022: No acute left MCA territorial infarct without significant change.  Additional subcentimeter acute infarct left occipital lobe.  No acute intracranial hemorrhage.    CT Head 9/30/2022: Interval demarcation of left lateral frontoparietal acute infarction. No loren hemorrhagic transformation.    CT Head 9/29/2022: Evolving infarction the left MCA territory. There is new patchy hyperintense signal along the anterior and posterior margins of the infarcted territory which may be related to contrast staining versus interval hemorrhagic transformation.    CT  head 9/28/2022 at 2149: No acute intra-cranial hemorrhage, mass effect, or midline shift. Degraded by motion.    CTA head and neck 9/28/2022:  NO HEMODYNAMIC SIGNIFICANT NARROWING WITHIN THE NECK. QUESTION OF A SMALL WEB WITHIN THE LEFT CAROTID BULB.   THERE IS AN ANTERIOR DIVISION LEFT MCA M2 BRANCH OCCLUSION.   MODERATE PERFUSION DEFECT LEFT FRONTAL PARIETAL REGION WITH A CORE INFARCT OF 29 ML AND PENUMBRA OF 1 ML    Restarted on Eliquis 5 mg BID. Risks versus benefits and adverse reactions associated with therapeutic anticoagulation with apixaban including paradoxically increased risk of stroke or MI upon abrupt discontinuation of the medication were discussed with patient at bedside in detail.      TTE 9/29 EF 69%, Bioprosthetic mitral valve replacement. The valve is well seated.  Minimal mitral transvalvular regurgitation. No mitral paravalvular regurgitation.  Normal left ventricular systolic function. No segmental wall motion abnormalities. No left ventricular thrombus. Cardiac monitoring, h/o AFib - Cardiology consult appreciated and will continue Sotalol    Hypercoag labs ordered and pending, anticardiolipin neg, homocysteine elevated but subtle, CRP 8 needs to be repeated in a none acute setting. Will followup outpatient with hematology.     LE doppler neg.     COVID PCR neg on 10/2 for discharge planning.     PT/OT recommended AR. Stable for discharge. 52F R-handed PMHx RF s/p valve transplant and Afib (on Eliquis; last dose at 1700) who initially presented to [facility] ED with R hand weakness and sudden onset of "not able to talk." Code stroke activated at [facility] ED. NIHSS 18. LKN 1907 9/28/22. mRS 0. Pt was not a tPA candidate due to elevated INR but a thrombectomy candidate due to LVO seen on CTA H/N. Pt transferred to [facility]. Underwent successful TICI 2c recanalization of left M2, still with significant aphasia motor exam, improving.  On re-exam, pt NIHSS 14. Vitals stable.    IMPRESSION: R hemiparesis, hemineglect, and expressive aphasia likely 2/2 Left brain dysfunction due to Left MCA M2 segment occlusion. Mechanism likely cardio-embolic i/s/o known Afib.      MR Head 9/30/2022: No acute left MCA territorial infarct without significant change.  Additional subcentimeter acute infarct left occipital lobe.  No acute intracranial hemorrhage.    CT Head 9/30/2022: Interval demarcation of left lateral frontoparietal acute infarction. No loren hemorrhagic transformation.    CT Head 9/29/2022: Evolving infarction the left MCA territory. There is new patchy hyperintense signal along the anterior and posterior margins of the infarcted territory which may be related to contrast staining versus interval hemorrhagic transformation.    CT  head 9/28/2022 at 2149: No acute intra-cranial hemorrhage, mass effect, or midline shift. Degraded by motion.    CTA head and neck 9/28/2022:  NO HEMODYNAMIC SIGNIFICANT NARROWING WITHIN THE NECK. QUESTION OF A SMALL WEB WITHIN THE LEFT CAROTID BULB.   THERE IS AN ANTERIOR DIVISION LEFT MCA M2 BRANCH OCCLUSION.   MODERATE PERFUSION DEFECT LEFT FRONTAL PARIETAL REGION WITH A CORE INFARCT OF 29 ML AND PENUMBRA OF 1 ML    started on Eliquis 5 mg BID (of note just found out that patient was previously taking Xarelto for afib)     TTE 9/29 EF 69%, Bioprosthetic mitral valve replacement. The valve is well seated.  Minimal mitral transvalvular regurgitation. No mitral paravalvular regurgitation.  Normal left ventricular systolic function. No segmental wall motion abnormalities. No left ventricular thrombus. Cardiac monitoring, h/o AFib - Cardiology consult appreciated and will continue Sotalol    Hypercoag labs ordered and pending, anticardiolipin neg, homocysteine elevated but subtle, CRP 8 needs to be repeated in a none acute setting. Will followup outpatient with hematology.     LE doppler neg.     COVID PCR neg on 10/2 for discharge planning.     Recommended acute rehab however her  wants to take her home. Risks discussed by both neurology and PMR team however he would like to take her home. He will provide 24 hr care for her. She does not need equipment for d/c home per . 52F R-handed PMHx RF s/p valve transplant and Afib (on xarelto; last dose at 1700) who initially presented to [facility] ED with R hand weakness and sudden onset of "not able to talk." Code stroke activated at [facility] ED. NIHSS 18. LKN 1907 9/28/22. mRS 0. Pt was not a tPA candidate due to elevated INR but a thrombectomy candidate due to LVO seen on CTA H/N. Pt transferred to [facility]. Underwent successful TICI 2c recanalization of left M2, still with significant aphasia motor exam, improving.  On re-exam, pt NIHSS 14. Vitals stable.    IMPRESSION: R hemiparesis, hemineglect, and expressive aphasia likely 2/2 Left brain dysfunction due to Left MCA M2 segment occlusion. Mechanism likely cardio-embolic i/s/o known Afib.      MR Head 9/30/2022: No acute left MCA territorial infarct without significant change.  Additional subcentimeter acute infarct left occipital lobe.  No acute intracranial hemorrhage.    CT Head 9/30/2022: Interval demarcation of left lateral frontoparietal acute infarction. No loren hemorrhagic transformation.    CT Head 9/29/2022: Evolving infarction the left MCA territory. There is new patchy hyperintense signal along the anterior and posterior margins of the infarcted territory which may be related to contrast staining versus interval hemorrhagic transformation.    CT  head 9/28/2022 at 2149: No acute intra-cranial hemorrhage, mass effect, or midline shift. Degraded by motion.    CTA head and neck 9/28/2022:  NO HEMODYNAMIC SIGNIFICANT NARROWING WITHIN THE NECK. QUESTION OF A SMALL WEB WITHIN THE LEFT CAROTID BULB.   THERE IS AN ANTERIOR DIVISION LEFT MCA M2 BRANCH OCCLUSION.   MODERATE PERFUSION DEFECT LEFT FRONTAL PARIETAL REGION WITH A CORE INFARCT OF 29 ML AND PENUMBRA OF 1 ML    started on Eliquis 5 mg BID (of note patient was previously taking Xarelto for afib)     TTE 9/29 EF 69%, Bioprosthetic mitral valve replacement. The valve is well seated.  Minimal mitral transvalvular regurgitation. No mitral paravalvular regurgitation.  Normal left ventricular systolic function. No segmental wall motion abnormalities. No left ventricular thrombus. Cardiac monitoring, h/o AFib - Cardiology consult appreciated and will continue Sotalol    Hypercoag labs ordered and pending, anticardiolipin neg, homocysteine elevated but subtle, CRP 8 needs to be repeated in a none acute setting. Will followup outpatient with hematology.     MAXX thrasher neg.     Recommended acute rehab however her  wants to take her home. Risks discussed by both neurology and PMR team however he would like to take her home. He will provide 24 hr care for her. She does not need equipment for d/c home per .

## 2022-10-03 NOTE — DISCHARGE NOTE PROVIDER - NSDCMRMEDTOKEN_GEN_ALL_CORE_FT
acetaminophen 325 mg oral tablet: 2 tab(s) orally every 6 hours, As needed, Temp greater or equal to 38C (100.4F), Moderate Pain (4 - 6)  multivitamin:   sotalol 80 mg oral tablet: 1 tab(s) orally every 12 hours  Vitamin B12:   Xarelto 20 mg oral tablet: 1 tab(s) orally once a day   apixaban 5 mg oral tablet: 1 tab(s) orally every 12 hours   apixaban 5 mg oral tablet: 1 tab(s) orally every 12 hours  atorvastatin 80 mg oral tablet: 1 tab(s) orally once a day (at bedtime)  sotalol 80 mg oral tablet: 1 tab(s) orally every 12 hours

## 2022-10-03 NOTE — PROGRESS NOTE ADULT - PROVIDER SPECIALTY LIST ADULT
Cardiology
Cardiology
NSICU
Neurology
Neurology
Cardiology
Internal Medicine
NSICU
Neurology
Neurology
Internal Medicine
NSICU
Cardiology
Internal Medicine
Internal Medicine
NSICU

## 2022-10-03 NOTE — PROGRESS NOTE ADULT - ASSESSMENT
Acute CVA  likely embolic   a/c    PAF   in sinus   on sotalol   monitor qtc   a/c    s/p MVR  stable on echo     HTN  permissive HTN as per neurology  Acute CVA  likely embolic   a/c    PAF   in sinus   on sotalol   monitor qtc   a/c    s/p MVR  stable on echo   low dose asa is also recommended     HTN  permissive HTN as per neurology

## 2022-10-03 NOTE — DISCHARGE NOTE PROVIDER - PROVIDER TOKENS
PROVIDER:[TOKEN:[[id number]],FOLLOWUP:[2 weeks]],PROVIDER:[TOKEN:[[id number]],FOLLOWUP:[2 weeks]],PROVIDER:[TOKEN:[[id number]],FOLLOWUP:[1 month]]

## 2022-10-03 NOTE — CONSULT NOTE ADULT - SUBJECTIVE AND OBJECTIVE BOX
Patient is a 52y old  Female who presents with a chief complaint of Left MCA M2 segment occlusion (03 Oct 2022 08:30)    AdmissionHPI:  52F R-handed PMHx RF s/p valve transplant and Afib (on Eliquis; last dose at 1700) who initially presented to [facility] ED with R hand weakness and sudden onset of "not able to talk." Per family, pt was generally feeling unwell all day and tonight, when pt was sitting at the dinner table with her , he noticed that pt was unresponsive and not moving her right hand. Pt was immediately taken to [facility] ED where code stroke was activated. NIHSS of 18 for aphasia and Right hemiparesis. LKN 1907 9/28/22. mRS 0. Pt was not a TPA candidate due to INR 1.97, PTT 39.3. CTA showed Left MCA occlusion in the M2 distribution prompting immediate transfer to [facility] for mechanical thrombectomy.  (29 Sep 2022 00:41)    Interval History:  Patient had imaging-  CT Brain Stroke Protocol (09.28.22 @ 21:49) >  No acute intra-cranial hemorrhage, mass effect, or midline shift.   Degraded by motion.    CT Head No Cont (09.30.22 @ 08:53) >  Interval demarcation of left lateral frontoparietal acute infarction.  No loren hemorrhagic transformation.    MR Head No Cont (09.30.22 @ 19:10) >  There is mild diffuse parenchymal volume loss. There are T2 prolongation   signal abnormalities in the periventricular subcortical white matter   likely related to mild chronic microvascular ischemic changes.  Acute left MCA territorial infarct noted without significant change in   size given differences in technique. Largest area measures approximately   6.2 x 3.4 cm.  There is additional subcentimeter acute infarct in the left occipital   lobe.  Multiple small chronic infarct bilateral cerebellum noted. Chronic   lacunar infarct right thalamus.  There is no acute parenchymal hemorrhage or midline shift. There is no   extra-axial fluid collection.  There is no hydrocephalus.  Mucosal thickening paranasal sinuses. Retention cyst/polyps maxillary   sinuses.    REVIEW OF SYSTEMS: No chest pain, shortness of breath, nausea, vomiting or diarhea; other ROS neg     PAST MEDICAL & SURGICAL HISTORY  Rheumatic fever  TIA (transient ischemic attack)  Afib  S/P mitral valve repair  H/O right knee surgery  S/P MVR (mitral valve replacement)    FUNCTIONAL HISTORY:   Lives in a house with family- 4 [address] and one flight.  PTA Independent    CURRENT FUNCTIONAL STATUS:  Min A transfers and gait    FAMILY HISTORY   Family history of acute myocardial infarction (Father)    MEDICATIONS   acetaminophen     Tablet .. 1000 milliGRAM(s) Oral every 6 hours PRN  apixaban 5 milliGRAM(s) Oral every 12 hours  atorvastatin 80 milliGRAM(s) Oral at bedtime  chlorhexidine 4% Liquid 1 Application(s) Topical daily  polyethylene glycol 3350 17 Gram(s) Oral two times a day  senna 2 Tablet(s) Oral at bedtime  sotalol 80 milliGRAM(s) Oral every 12 hours    ALLERGIES  IV Contrast (Urticaria)    VITALS  T(C): 36.7 (10-03-22 @ 04:55), Max: 36.9 (10-02-22 @ 16:44)  HR: 65 (10-03-22 @ 04:55) (55 - 67)  BP: 112/69 (10-03-22 @ 04:55) (107/70 - 144/80)  RR: 18 (10-03-22 @ 04:55) (12 - 19)  SpO2: 98% (10-03-22 @ 04:55) (98% - 99%)  Wt(kg): --    PHYSICAL EXAM  Constitutional - NAD, Comfortable  HEENT - NCAT, EOMI  Neck - Supple, No limited ROM  Chest - CTA bilaterally, No wheeze, No rhonchi, No crackles  Cardiovascular - RRR, S1S2, No murmurs  Abdomen - BS+, Soft, NTND  Extremities - No C/C/E, No calf tenderness   Neurologic Exam -                    Cognitive - Awake, Alert, AAO to self, place, date, year, situation     Communication - Fluent, No dysarthria, no aphasia     Cranial Nerves - CN 2-12 intact     Motor - No focal deficits      Sensory - Intact to LT     Reflexes - DTR Intact, No primitive reflexive  Psychiatric - Mood stable, Affect WNL    Impression:  53 yo with functional deficits secondary to diagnosis of L MCA CVA    Plan:  PT- ROM, Bed Mob, Transfers, Amb w AD and bracing as needed  OT- ADLs, bracing  SLP- Dysphagia eval and treat  Prec- Falls, Cardiac  DVT Prophylaxis- On Apixaban  Skin- Turn q2 h  Dispo-  Patient is a 52y old  Female who presents with a chief complaint of Left MCA M2 segment occlusion (03 Oct 2022 08:30)    AdmissionHPI:  52F R-handed PMHx RF s/p valve transplant and Afib (on Eliquis; last dose at 1700) who initially presented to [facility] ED with R hand weakness and sudden onset of "not able to talk." Per family, pt was generally feeling unwell all day and tonight, when pt was sitting at the dinner table with her , he noticed that pt was unresponsive and not moving her right hand. Pt was immediately taken to [facility] ED where code stroke was activated. NIHSS of 18 for aphasia and Right hemiparesis. LKN 1907 9/28/22. mRS 0. Pt was not a TPA candidate due to INR 1.97, PTT 39.3. CTA showed Left MCA occlusion in the M2 distribution prompting immediate transfer to [facility] for mechanical thrombectomy.  (29 Sep 2022 00:41)    Interval History:  Patient had imaging-  CT Brain Stroke Protocol (09.28.22 @ 21:49) >  No acute intra-cranial hemorrhage, mass effect, or midline shift.   Degraded by motion.    CT Head No Cont (09.30.22 @ 08:53) >  Interval demarcation of left lateral frontoparietal acute infarction.  No loren hemorrhagic transformation.    MR Head No Cont (09.30.22 @ 19:10) >  There is mild diffuse parenchymal volume loss. There are T2 prolongation   signal abnormalities in the periventricular subcortical white matter   likely related to mild chronic microvascular ischemic changes.  Acute left MCA territorial infarct noted without significant change in   size given differences in technique. Largest area measures approximately   6.2 x 3.4 cm.  There is additional subcentimeter acute infarct in the left occipital   lobe.  Multiple small chronic infarct bilateral cerebellum noted. Chronic   lacunar infarct right thalamus.  There is no acute parenchymal hemorrhage or midline shift. There is no   extra-axial fluid collection.  There is no hydrocephalus.  Mucosal thickening paranasal sinuses. Retention cyst/polyps maxillary   sinuses.    Patient's  at bedside.     REVIEW OF SYSTEMS: Patient w aphasia and difficulty communicating but nods no when asked if she has pain or has CP or SOB     PAST MEDICAL & SURGICAL HISTORY  Rheumatic fever  TIA (transient ischemic attack)  Afib  S/P mitral valve repair  H/O right knee surgery  S/P MVR (mitral valve replacement)    FUNCTIONAL HISTORY:   Lives in a house with family- 4 [address] and one flight.  PTA Independent    CURRENT FUNCTIONAL STATUS:  Min A transfers and gait    FAMILY HISTORY   Family history of acute myocardial infarction (Father)    MEDICATIONS   acetaminophen     Tablet .. 1000 milliGRAM(s) Oral every 6 hours PRN  apixaban 5 milliGRAM(s) Oral every 12 hours  atorvastatin 80 milliGRAM(s) Oral at bedtime  chlorhexidine 4% Liquid 1 Application(s) Topical daily  polyethylene glycol 3350 17 Gram(s) Oral two times a day  senna 2 Tablet(s) Oral at bedtime  sotalol 80 milliGRAM(s) Oral every 12 hours    ALLERGIES  IV Contrast (Urticaria)    VITALS  T(C): 36.7 (10-03-22 @ 04:55), Max: 36.9 (10-02-22 @ 16:44)  HR: 65 (10-03-22 @ 04:55) (55 - 67)  BP: 112/69 (10-03-22 @ 04:55) (107/70 - 144/80)  RR: 18 (10-03-22 @ 04:55) (12 - 19)  SpO2: 98% (10-03-22 @ 04:55) (98% - 99%)  Wt(kg): --    PHYSICAL EXAM  Constitutional - NAD, Comfortable  HEENT - R facial droop, NCAT, EOMI  Neck - Supple, No limited ROM  Chest - CTA bilaterally, No wheeze, No rhonchi, No crackles  Cardiovascular - RRR, S1S2, No murmurs  Abdomen - BS+, Soft, NTND  Extremities - No C/C/E, No calf tenderness   Neurologic Exam -                 Alert  + non-fluent aphasia  RUE prox 4/5, distal 0/5; RLE prox 4/5, distal 1/5  LUE/LLE 5/5  No clonus    Psychiatric - Mood stable, Affect WNL    Impression:  51 yo with functional deficits secondary to diagnosis of L MCA CVA    Plan:  PT- ROM, Bed Mob, Transfers, Amb w AD and bracing as needed  OT- ADLs, bracing  SLP- Dysphagia eval and treat  Prec- Falls, Cardiac  DVT Prophylaxis- On Apixaban  Skin- Turn q2 h  Dispo- Acute Rehab- can tolerate 3h/d of therapies and requires daily physician visits. Discussed w patient and her  rehab options and how inpatient rehabilitation would be better and optimize functional outcome-  still prefers home with home therapies.

## 2022-10-04 LAB
APCR PPP: 2.55 RATIO — SIGNIFICANT CHANGE UP
PROT S FREE PPP-ACNC: 86 % — SIGNIFICANT CHANGE UP (ref 63–140)

## 2022-10-06 LAB
DNA PLOIDY SPEC FC-IMP: SIGNIFICANT CHANGE UP
PTR INTERPRETATION: SIGNIFICANT CHANGE UP

## 2022-10-12 ENCOUNTER — TRANSCRIPTION ENCOUNTER (OUTPATIENT)
Age: 52
End: 2022-10-12

## 2022-10-19 ENCOUNTER — APPOINTMENT (OUTPATIENT)
Dept: NEUROLOGY | Facility: CLINIC | Age: 52
End: 2022-10-19

## 2022-11-04 ENCOUNTER — INPATIENT (INPATIENT)
Facility: HOSPITAL | Age: 52
LOS: 1 days | Discharge: HOME | DRG: 65 | End: 2022-11-06
Attending: INTERNAL MEDICINE | Admitting: INTERNAL MEDICINE
Payer: COMMERCIAL

## 2022-11-04 VITALS
HEART RATE: 68 BPM | SYSTOLIC BLOOD PRESSURE: 161 MMHG | DIASTOLIC BLOOD PRESSURE: 95 MMHG | HEIGHT: 67 IN | WEIGHT: 160.06 LBS | RESPIRATION RATE: 18 BRPM | OXYGEN SATURATION: 100 %

## 2022-11-04 DIAGNOSIS — Z95.2 PRESENCE OF PROSTHETIC HEART VALVE: Chronic | ICD-10-CM

## 2022-11-04 DIAGNOSIS — Z98.89 OTHER SPECIFIED POSTPROCEDURAL STATES: Chronic | ICD-10-CM

## 2022-11-04 DIAGNOSIS — Z98.890 OTHER SPECIFIED POSTPROCEDURAL STATES: Chronic | ICD-10-CM

## 2022-11-04 PROCEDURE — 99291 CRITICAL CARE FIRST HOUR: CPT

## 2022-11-04 NOTE — ED PROVIDER NOTE - PHYSICAL EXAMINATION
GENERAL: Awake, alert, NAD  HEENT: NC/AT, moist mucous membranes, PERRL, EOMI  LUNGS: CTAB, no wheezes or crackles   CARDIAC: RRR, no m/r/g  ABDOMEN: Soft, normal BS, non tender, non distended, no rebound, no guarding  BACK: No midline spinal tenderness, no CVA tenderness  EXT: No edema, no calf tenderness, 2+ DP pulses bilaterally, no deformities.  NEURO: A&Ox3. Moving all extremities. Dysarthria, R facial droop, RUE 4/5 strength (all chronic). LUE sensation discrepancy (numbness) below L elbow. 5/5 strength LE bilaterally, sensation intact. Normal gait. No pronator drift.   SKIN: Warm and dry. No rash.  PSYCH: Normal affect.

## 2022-11-04 NOTE — ED PROVIDER NOTE - PROGRESS NOTE DETAILS
Attending Masom:  stroke alert called, pt at ct scan, calling rads to discuss need for iv scan despite allergy, discussed w/ neuro and fam who agrees, allergy is urticaria, not anahylaxis, currently teams agree, r outweigh b

## 2022-11-04 NOTE — ED PROVIDER NOTE - OBJECTIVE STATEMENT
52 year old F with PMH RF s/p valve transplant and Afib (on coumadin), stroke (Left MCA M2 segment occlusion) 9/28 presenting with RUE weakness and LUE numbness. Last known normal 830 am. Patient has been experiencing symptoms on and off since this morning. Is normally able to fully open her right fist, but fingers have been contracted today. Also has "pins and needles" sensation in LUE from elbow through hands. Has residual R sided weakness, R facial droop, dysarthria from prior stroke. Has no LE complaints. Denies gait abnormalities, HA, vision changes, fevers/chills, CP, SOB, syncope, head trauma.

## 2022-11-04 NOTE — ED PROVIDER NOTE - ATTENDING CONTRIBUTION TO CARE
MD Barakat:  patient seen and evaluated personally.   I agree with the History & Physical,  Impression & Plan other than what was detailed in my note.  MD Barakat  51 y/o f previous hx of stroke in sept r sided weakness, dysarthria since incident, presenting w/ r sided facial weakness, worsening hand weakness, unable to open  now, and tingling in left side of arm, last normal at 08;30 normal before, in ed as stroke alert, afebrile, bg 90's, r sided hand weakness, pt has slurred speech, difficulty articulating, stroke continued, has allergy to iv dye, will address, neuro at bs.

## 2022-11-04 NOTE — ED PROVIDER NOTE - CLINICAL SUMMARY MEDICAL DECISION MAKING FREE TEXT BOX
52 year old F with PMH RF s/p valve transplant and Afib (on coumadin), stroke (Left MCA M2 segment occlusion) 9/28 presenting with RUE weakness and LUE numbness. Last known normal 830 am. VSS. Code stroke called. To get all imaging, including perfusion study. DDx includes acute stroke vs TIA vs recrudescence. Dispo per neurology recommendations.

## 2022-11-04 NOTE — ED PROVIDER NOTE - NS ED ROS FT
CONST: no fevers, no chills  EYES: no pain, no vision changes  ENT: no sore throat, no ear pain, no change in hearing  CV: no chest pain, no leg swelling  RESP: no shortness of breath, no cough  ABD: no abdominal pain, no nausea, no vomiting, no diarrhea  : no dysuria, no flank pain, no hematuria  MSK: no back pain, no extremity pain  NEURO: no headache, +RUE weakness, +LUE paresthesias  HEME: no easy bleeding  SKIN:  no rash

## 2022-11-05 DIAGNOSIS — R09.89 OTHER SPECIFIED SYMPTOMS AND SIGNS INVOLVING THE CIRCULATORY AND RESPIRATORY SYSTEMS: ICD-10-CM

## 2022-11-05 LAB
A1C WITH ESTIMATED AVERAGE GLUCOSE RESULT: 5.8 % — HIGH (ref 4–5.6)
ALBUMIN SERPL ELPH-MCNC: 4.3 G/DL — SIGNIFICANT CHANGE UP (ref 3.3–5)
ALP SERPL-CCNC: 97 U/L — SIGNIFICANT CHANGE UP (ref 40–120)
ALT FLD-CCNC: 26 U/L — SIGNIFICANT CHANGE UP (ref 10–45)
ANION GAP SERPL CALC-SCNC: 13 MMOL/L — SIGNIFICANT CHANGE UP (ref 5–17)
APTT BLD: 34.3 SEC — SIGNIFICANT CHANGE UP (ref 27.5–35.5)
AST SERPL-CCNC: 26 U/L — SIGNIFICANT CHANGE UP (ref 10–40)
BASOPHILS # BLD AUTO: 0.05 K/UL — SIGNIFICANT CHANGE UP (ref 0–0.2)
BASOPHILS NFR BLD AUTO: 0.8 % — SIGNIFICANT CHANGE UP (ref 0–2)
BILIRUB SERPL-MCNC: 0.4 MG/DL — SIGNIFICANT CHANGE UP (ref 0.2–1.2)
BUN SERPL-MCNC: 19 MG/DL — SIGNIFICANT CHANGE UP (ref 7–23)
CALCIUM SERPL-MCNC: 9.6 MG/DL — SIGNIFICANT CHANGE UP (ref 8.4–10.5)
CHLORIDE SERPL-SCNC: 106 MMOL/L — SIGNIFICANT CHANGE UP (ref 96–108)
CHOLEST SERPL-MCNC: 127 MG/DL — SIGNIFICANT CHANGE UP
CO2 SERPL-SCNC: 22 MMOL/L — SIGNIFICANT CHANGE UP (ref 22–31)
CREAT SERPL-MCNC: 0.91 MG/DL — SIGNIFICANT CHANGE UP (ref 0.5–1.3)
EGFR: 75 ML/MIN/1.73M2 — SIGNIFICANT CHANGE UP
EOSINOPHIL # BLD AUTO: 0.44 K/UL — SIGNIFICANT CHANGE UP (ref 0–0.5)
EOSINOPHIL NFR BLD AUTO: 7.1 % — HIGH (ref 0–6)
ESTIMATED AVERAGE GLUCOSE: 120 MG/DL — HIGH (ref 68–114)
FLUAV AG NPH QL: SIGNIFICANT CHANGE UP
FLUBV AG NPH QL: SIGNIFICANT CHANGE UP
GLUCOSE SERPL-MCNC: 111 MG/DL — HIGH (ref 70–99)
HCG SERPL-ACNC: 2 MIU/ML — SIGNIFICANT CHANGE UP
HCT VFR BLD CALC: 44.7 % — SIGNIFICANT CHANGE UP (ref 34.5–45)
HDLC SERPL-MCNC: 53 MG/DL — SIGNIFICANT CHANGE UP
HGB BLD-MCNC: 13.8 G/DL — SIGNIFICANT CHANGE UP (ref 11.5–15.5)
IMM GRANULOCYTES NFR BLD AUTO: 0.2 % — SIGNIFICANT CHANGE UP (ref 0–0.9)
INR BLD: 2.17 RATIO — HIGH (ref 0.88–1.16)
LIPID PNL WITH DIRECT LDL SERPL: 62 MG/DL — SIGNIFICANT CHANGE UP
LYMPHOCYTES # BLD AUTO: 1.84 K/UL — SIGNIFICANT CHANGE UP (ref 1–3.3)
LYMPHOCYTES # BLD AUTO: 29.7 % — SIGNIFICANT CHANGE UP (ref 13–44)
MCHC RBC-ENTMCNC: 24.9 PG — LOW (ref 27–34)
MCHC RBC-ENTMCNC: 30.9 GM/DL — LOW (ref 32–36)
MCV RBC AUTO: 80.7 FL — SIGNIFICANT CHANGE UP (ref 80–100)
MONOCYTES # BLD AUTO: 0.58 K/UL — SIGNIFICANT CHANGE UP (ref 0–0.9)
MONOCYTES NFR BLD AUTO: 9.4 % — SIGNIFICANT CHANGE UP (ref 2–14)
NEUTROPHILS # BLD AUTO: 3.28 K/UL — SIGNIFICANT CHANGE UP (ref 1.8–7.4)
NEUTROPHILS NFR BLD AUTO: 52.8 % — SIGNIFICANT CHANGE UP (ref 43–77)
NON HDL CHOLESTEROL: 73 MG/DL — SIGNIFICANT CHANGE UP
NRBC # BLD: 0 /100 WBCS — SIGNIFICANT CHANGE UP (ref 0–0)
PLATELET # BLD AUTO: 156 K/UL — SIGNIFICANT CHANGE UP (ref 150–400)
POTASSIUM SERPL-MCNC: 4.4 MMOL/L — SIGNIFICANT CHANGE UP (ref 3.5–5.3)
POTASSIUM SERPL-SCNC: 4.4 MMOL/L — SIGNIFICANT CHANGE UP (ref 3.5–5.3)
PROT SERPL-MCNC: 7.9 G/DL — SIGNIFICANT CHANGE UP (ref 6–8.3)
PROTHROM AB SERPL-ACNC: 25.2 SEC — HIGH (ref 10.5–13.4)
RBC # BLD: 5.54 M/UL — HIGH (ref 3.8–5.2)
RBC # FLD: 13.3 % — SIGNIFICANT CHANGE UP (ref 10.3–14.5)
RSV RNA NPH QL NAA+NON-PROBE: SIGNIFICANT CHANGE UP
SARS-COV-2 RNA SPEC QL NAA+PROBE: SIGNIFICANT CHANGE UP
SODIUM SERPL-SCNC: 141 MMOL/L — SIGNIFICANT CHANGE UP (ref 135–145)
TRIGL SERPL-MCNC: 55 MG/DL — SIGNIFICANT CHANGE UP
TROPONIN T, HIGH SENSITIVITY RESULT: 7 NG/L — SIGNIFICANT CHANGE UP (ref 0–51)
WBC # BLD: 6.2 K/UL — SIGNIFICANT CHANGE UP (ref 3.8–10.5)
WBC # FLD AUTO: 6.2 K/UL — SIGNIFICANT CHANGE UP (ref 3.8–10.5)

## 2022-11-05 PROCEDURE — 70496 CT ANGIOGRAPHY HEAD: CPT | Mod: 26,MA

## 2022-11-05 PROCEDURE — 99220: CPT

## 2022-11-05 PROCEDURE — 70498 CT ANGIOGRAPHY NECK: CPT | Mod: 26,MA

## 2022-11-05 PROCEDURE — 0042T: CPT | Mod: MA

## 2022-11-05 PROCEDURE — G1004: CPT

## 2022-11-05 PROCEDURE — 70551 MRI BRAIN STEM W/O DYE: CPT | Mod: 26,MG

## 2022-11-05 RX ORDER — SOTALOL HCL 120 MG
80 TABLET ORAL EVERY 12 HOURS
Refills: 0 | Status: DISCONTINUED | OUTPATIENT
Start: 2022-11-05 | End: 2022-11-06

## 2022-11-05 RX ORDER — ATORVASTATIN CALCIUM 80 MG/1
80 TABLET, FILM COATED ORAL DAILY
Refills: 0 | Status: DISCONTINUED | OUTPATIENT
Start: 2022-11-05 | End: 2022-11-06

## 2022-11-05 RX ORDER — WARFARIN SODIUM 2.5 MG/1
3.75 TABLET ORAL ONCE
Refills: 0 | Status: DISCONTINUED | OUTPATIENT
Start: 2022-11-05 | End: 2022-11-05

## 2022-11-05 RX ORDER — ALPRAZOLAM 0.25 MG
0.25 TABLET ORAL ONCE
Refills: 0 | Status: DISCONTINUED | OUTPATIENT
Start: 2022-11-05 | End: 2022-11-05

## 2022-11-05 RX ORDER — SOTALOL HCL 120 MG
80 TABLET ORAL ONCE
Refills: 0 | Status: COMPLETED | OUTPATIENT
Start: 2022-11-05 | End: 2022-11-05

## 2022-11-05 RX ORDER — WARFARIN SODIUM 2.5 MG/1
3.75 TABLET ORAL ONCE
Refills: 0 | Status: COMPLETED | OUTPATIENT
Start: 2022-11-05 | End: 2022-11-05

## 2022-11-05 RX ADMIN — Medication 0.25 MILLIGRAM(S): at 08:49

## 2022-11-05 RX ADMIN — Medication 80 MILLIGRAM(S): at 12:50

## 2022-11-05 RX ADMIN — ATORVASTATIN CALCIUM 80 MILLIGRAM(S): 80 TABLET, FILM COATED ORAL at 12:50

## 2022-11-05 RX ADMIN — Medication 40 MILLIGRAM(S): at 00:01

## 2022-11-05 RX ADMIN — WARFARIN SODIUM 3.75 MILLIGRAM(S): 2.5 TABLET ORAL at 12:53

## 2022-11-05 NOTE — ED CDU PROVIDER DISPOSITION NOTE - ATTENDING APP SHARED VISIT CONTRIBUTION OF CARE
Attending MD Damico:   I personally have seen and examined this patient.  Physician assistant note reviewed and agree on plan of care and except where noted.  See below for details.     seen in CDU [facility], seen with Dr. Montez at bedside    52F with PMH/PSH including rheumatic fever s/p valve transplant, AFib on Coumadin, recent CVA with L MCA (9/2022) sent to the CDU after presenting to the ED with R facial droop, RUE weakness and LUE numbness.  Reports R facial droop and RUE weakness (reports two days ago was able to fully open hand, now unable to fully extend finger).  Reports baseline R sided weakness and dysarthria from stroke in September.  In Emergency Department, CT and labs nonactionable. Denies shortness of breath, abdominal pain, nausea, vomiting, diarrhea, urinary complaints.     Exam:   General: NAD  HENT: head NCAT, airway patent   Chest: symmetric chest rise, no increased work of breathing  MSK: ranging neck and extremities freely   Neuro: +facial asymmetry, RUE decreased strength 4+/5, +dysarthria, moving all extremities spontaneously  Psych: normal mood and affect     A/P: 52F with R facial droop, RUE weakness, LUE numbness, pending MRI and final neuro recs, will await

## 2022-11-05 NOTE — CHART NOTE - NSCHARTNOTEFT_GEN_A_CORE
MRI 11/5 results:  A small acute infarct is seen in the right precentral gyrus involving the hand knob.  Subacute left posterior MCA territory infarct is seen with resolving diffusion restriction since comparison study. Chronic right parietal lobe, left cerebellar, and left basal ganglia infarcts. No acute intracranial hemorrhage. Scattered FLAIR hyperintense white matter foci, compatible with chronic small vessel disease. No hydrocephalus. No extra-axial fluid collections. The skull base flow voids are present.  The visualized intraorbital contents are normal. Mild mucosal thickening in the bilateral maxillary sinuses. The mastoid air cells are clear. The visualized osseous structures, soft tissues and partially visualized parotid glands appear normal.  IMPRESSION:  -Small acute infarct in the right precentral gyrus involving the hand knob.  -Subacute left posterior MCA territory infarct again seen. Multiple chronic infarcts as described above.  -No acute intracranial hemorrhage.    Labs:  HbA1C 5.8 and LDL 62.         RECOMMENDATIONS/PLAN:    -  Continue with coumadin INR goal 2.5-3.   -  Monitor INR closely.  -  Goal normotension after 24 hours following symptom onset  -  PT/OT - can be outpatient  -  Stroke education provided  -  Counseling on diet, exercise, and medication adherence was done  -  Counseling on smoking cessation and alcohol consumption offered when appropriate.  -  Importance of fall prevention discussed.   -  Follow up with outpatient Neurology stroke clinic Dr. Montez.

## 2022-11-05 NOTE — ED CDU PROVIDER INITIAL DAY NOTE - OBJECTIVE STATEMENT
52 year old F with PMH Rheumatic fever s/p valve transplant and Afib (on coumadin), stroke (Left MCA M2 segment occlusion s/p thrombectomy) Sep 2022 presenting with RUE weakness and LUE numbness. Last known normal 830 am yesterday. Patient has been experiencing symptoms on and off since. Is normally able to fully open her right fist, but has been having difficulty. Also endorses numbness in LUE. Per  at bedside, noticed increased right sided facial weakness today. Has residual R sided weakness and dysarthria from prior stroke. Has no lower extremity complaints. Denies gait abnormalities, HA, vision changes, fevers/chills, CP, SOB. Follows with speech therapy and occupational therapy.   ED course: CT head negative for acute pathology. Neuro consulted, recommending MRI. Plan for imaging, neuro checks, and close observation in CDU.

## 2022-11-05 NOTE — ED CDU PROVIDER INITIAL DAY NOTE - PHYSICAL EXAMINATION
GENERAL: Awake, alert, NAD  	HEENT: NC/AT, moist mucous membranes, EOMI  	LUNGS: CTAB, no wheezes or crackles   	CARDIAC: RRR, no m/r/g  	ABDOMEN: Soft, non tender, non distended  	EXT: No edema, no calf tenderness  	NEURO: A&Ox3. Moving all extremities. Dysarthria. R facial droop. RUE 4/5 strength, otherwise strength 5/5 LUE/LLE/RLE. Sensation grossly intact. Steady gait. No pronator drift.   	SKIN: Warm and dry. No visible rash.  PSYCH: Normal affect.

## 2022-11-05 NOTE — H&P ADULT - HISTORY OF PRESENT ILLNESS
51 y/o  female with PMHx tissue mitral valve transplant,  a fib, Left M2 occlusion s/p thrombectomy, h/o multiple sclerosis presenting a code stroke LKW 8:29AM, for left arm tingling/numbness that began yesterday in am   Patient has residual focal deficits from prior left MCA infarct, which include mild right sided weakness mild right facial asymmetry (new), and dysarthria.   Patient needs help bathing and dressing herself since her right hand is contracted, but overall independent and able to walk on her own.   Patient denies headache, nausea, vomiting, visual disturbances.

## 2022-11-05 NOTE — H&P ADULT - ASSESSMENT
53 y/o  female with PMHx tissue mitral valve transplant,  a fib, Left M2 occlusion s/p thrombectomy, h/o multiple sclerosis presenting a code stroke LKW 8:29AM, for left arm tingling/numbness that began yesterday in am   Patient has residual focal deficits from prior left MCA infarct, which include mild right sided weakness mild right facial asymmetry (new), and dysarthria.   pt w/ new stroke on MRI  neuro eval noted  inr sub therapeutic  pt  c/w meds  cards eval noted  neuro f/u   51 y/o  female with PMHx tissue mitral valve transplant,  a fib, Left M2 occlusion s/p thrombectomy, h/o multiple sclerosis presenting a code stroke LKW 8:29AM, for left arm tingling/numbness that began yesterday in am   Patient has residual focal deficits from prior left MCA infarct, which include mild right sided weakness mild right facial asymmetry (new), and dysarthria.   pt w/ new stroke on MRI  neuro eval noted  inr sub therapeutic  pt changed to coumadin last week  hx a fib  in s r   pt  c/w meds  cards eval noted  neuro f/u

## 2022-11-05 NOTE — CONSULT NOTE ADULT - SUBJECTIVE AND OBJECTIVE BOX
CHIEF COMPLAINT: Weakness left arm, right facial droop.                                                        Neurologist: Dr. Jt Coker         HPI:   51 y/o right handed female with PMHx tissue mitral valve transplant,  a fib, Left M2 occlusion s/p thrombectomy, h/o multiple sclerosis presenting a code stroke LKW 8:29AM, for left arm tingling/numbness that began at 8:30 AM abruptly. Patient has residual focal deficits from prior left MCA infarct, which include mild right sided weakness (no drift on stroke exam), mild right facial asymmetry (new), and dysarthria. Patient needs help bathing and dressing herself since her right hand is contracted, but overall independent and able to walk on her own. Patient denies headache, nausea, vomiting, visual disturbances.  [Patients daughter called me last night at about 10:30 AM and with above symptoms sent her directly to the [facility] ER]    PAST MEDICAL & SURGICAL HISTORY:  Rheumatic fever  as a teen, s/p mitral valve repair 1991      TIA (transient ischemic attack)  on Aspirin      Afib      S/P mitral valve repair  1991      H/O right knee surgery      S/P MVR (mitral valve replacement)  Re op MVR (T)          Allergies    IV Contrast (Urticaria)    Intolerances        SOCIAL HISTORY    Smoking Hx: None  ETOH Hx: None  Marital Status:     FAMILY HISTORY:  Family history of acute myocardial infarction (Father)        MEDICATIONS:  atorvastatin 80 milliGRAM(s) Oral daily  sotalol. 80 milliGRAM(s) Oral once  warfarin 3.75 milliGRAM(s) Oral once      REVIEW OF SYSTEMS:    CONSTITUTIONAL: No weakness, fevers or chills  EYES/ENT: No visual changes;  No vertigo or throat pain   NECK: No pain or stiffness  RESPIRATORY: No cough, wheezing, hemoptysis; No shortness of breath  CARDIOVASCULAR: No chest pain or palpitations  GASTROINTESTINAL: No abdominal or epigastric pain. No nausea, vomiting, or hematemesis; No diarrhea or constipation. No melena or hematochezia.  GENITOURINARY: No dysuria, frequency or hematuria  NEUROLOGICAL: right facial, left arm weakness which is new  SKIN: No itching, burning, rashes, or lesions   All other review of systems is negative unless indicated above    Vital Signs Last 24 Hrs  T(C): 36.4 (05 Nov 2022 08:01), Max: 36.7 (05 Nov 2022 05:26)  T(F): 97.5 (05 Nov 2022 08:01), Max: 98.1 (05 Nov 2022 05:26)  HR: 64 (05 Nov 2022 08:01) (64 - 68)  BP: 106/64 (05 Nov 2022 08:01) (106/64 - 161/95)  BP(mean): --  RR: 17 (05 Nov 2022 08:01) (16 - 18)  SpO2: 96% (05 Nov 2022 08:01) (96% - 100%)    Parameters below as of 05 Nov 2022 08:01  Patient On (Oxygen Delivery Method): room air        I&O's Summary      PHYSICAL EXAM:    Constitutional: NAD, awake and alert, well-developed  HEENT: PERR, EOMI  Neck: soft and supple, No LAD, No JVD  Respiratory: Breath sounds are clear bilaterally, No wheezing, rales or rhonchi  Cardiovascular: Regular rate and rhythm, normal S1 and S2,  no murmurs, gallops or rubs  Gastrointestinal: Bowel Sounds present, soft, nontender.   Extremities: No peripheral edema. No clubbing or cyanosis.  Vascular: 2+ peripheral pulses  Neurological: A/O x 3, no focal deficits  Musculoskeletal: no calf tenderness.  Skin: No rashes.      LABS: All Labs Reviewed:                        13.8   6.20  )-----------( 156      ( 05 Nov 2022 00:04 )             44.7     05 Nov 2022 00:04    141    |  106    |  19     ----------------------------<  111    4.4     |  22     |  0.91     Ca    9.6        05 Nov 2022 00:04  Mg     1.9       05 Nov 2022 00:04    TPro  7.9    /  Alb  4.3    /  TBili  0.4    /  DBili  x      /  AST  26     /  ALT  26     /  AlkPhos  97     05 Nov 2022 00:04    PT/INR - ( 05 Nov 2022 00:04 )   PT: 25.2 sec;   INR: 2.17 ratio         PTT - ( 05 Nov 2022 00:04 )  PTT:34.3 sec  Blood Culture:               RADIOLOGY/EKG: NSR, NSSTTW changes    < from: MR Head No Cont (11.05.22 @ 10:49) >  -Small acute infarct in the right precentral gyrus involving the hand   knob.  -Subacute left posterior MCA territory infarct again seen. Multiple   chronic infarcts as described above.  -No acute intracranial hemorrhage.    < end of copied text >

## 2022-11-05 NOTE — ED CDU PROVIDER INITIAL DAY NOTE - DETAILS
Tele, neuro checks, MRI, neuro following, PT/OT consult, DW Masom, vitals every 4 hours, frequent reevaluations

## 2022-11-05 NOTE — CONSULT NOTE ADULT - SUBJECTIVE AND OBJECTIVE BOX
Patient is a 52y old  Female who presents with a chief complaint of New neurologic findings after CVA (05 Nov 2022 11:16)      INTERVAL HPI/OVERNIGHT EVENTS:    Medications:MEDICATIONS  (STANDING):  atorvastatin 80 milliGRAM(s) Oral daily  sotalol. 80 milliGRAM(s) Oral once  warfarin 3.75 milliGRAM(s) Oral once    MEDICATIONS  (PRN):      Allergies: Allergies    IV Contrast (Urticaria)    Intolerances          FAMILY HISTORY:  Family history of acute myocardial infarction (Father)          PAST MEDICAL & SURGICAL HISTORY:  Rheumatic fever  as a teen, s/p mitral valve repair 1991      TIA (transient ischemic attack)  on Aspirin      Afib      S/P mitral valve repair  1991      H/O right knee surgery      S/P MVR (mitral valve replacement)  Re op MVR (T)          REVIEW OF SYSTEMS:  CONSTITUTIONAL: No fever, weight loss, or fatigue  EYES: No eye pain, visual disturbances, or discharge  ENMT:  No difficulty hearing, tinnitus, vertigo; No sinus or throat pain  NECK: No pain or stiffness  BREASTS: No pain, masses, or nipple discharge  RESPIRATORY: No cough, wheezing, chills or hemoptysis; No shortness of breath  CARDIOVASCULAR: No chest pain, palpitations, dizziness, or leg swelling  GASTROINTESTINAL: No abdominal or epigastric pain. No nausea, vomiting, or hematemesis; No diarrhea or constipation. No melena or hematochezia.  GENITOURINARY: No dysuria, frequency, hematuria, or incontinence  NEUROLOGICAL: No headaches, memory loss, loss of strength, numbness, or tremors  SKIN: No itching, burning, rashes, or lesions   LYMPH NODES: No enlarged glands  ENDOCRINE: No heat or cold intolerance; No hair loss  MUSCULOSKELETAL: No joint pain or swelling; No muscle, back, or extremity pain  PSYCHIATRIC: No depression, anxiety, mood swings, or difficulty sleeping  HEME/LYMPH: No easy bruising, or bleeding gums  ALLERY AND IMMUNOLOGIC: No hives or eczema    T(C): 36.4 (11-05-22 @ 08:01), Max: 36.7 (11-05-22 @ 05:26)  HR: 64 (11-05-22 @ 08:01) (64 - 68)  BP: 106/64 (11-05-22 @ 08:01) (106/64 - 161/95)  RR: 17 (11-05-22 @ 08:01) (16 - 18)  SpO2: 96% (11-05-22 @ 08:01) (96% - 100%)  Wt(kg): --Vital Signs Last 24 Hrs  T(C): 36.4 (05 Nov 2022 08:01), Max: 36.7 (05 Nov 2022 05:26)  T(F): 97.5 (05 Nov 2022 08:01), Max: 98.1 (05 Nov 2022 05:26)  HR: 64 (05 Nov 2022 08:01) (64 - 68)  BP: 106/64 (05 Nov 2022 08:01) (106/64 - 161/95)  BP(mean): --  RR: 17 (05 Nov 2022 08:01) (16 - 18)  SpO2: 96% (05 Nov 2022 08:01) (96% - 100%)    Parameters below as of 05 Nov 2022 08:01  Patient On (Oxygen Delivery Method): room air      I&O's Summary      PHYSICAL EXAM:  GENERAL: NAD, well-groomed, well-developed  HEAD:  Atraumatic, Normocephalic  EYES: EOMI, PERRLA, conjunctiva and sclera clear  ENMT: No tonsillar erythema, exudates, or enlargement; Moist mucous membranes, Good dentition, No lesions  NECK: Supple, No JVD, Normal thyroid  NERVOUS SYSTEM:  Alert & Oriented X3, Good concentration; Motor Strength 5/5 B/L upper and lower extremities; DTRs 2+ intact and symmetric  CHEST/LUNG: Clear to percussion bilaterally; No rales, rhonchi, wheezing, or rubs  HEART: Regular rate and rhythm; No murmurs, rubs, or gallops  ABDOMEN: Soft, Nontender, Nondistended; Bowel sounds present  EXTREMITIES:  2+ Peripheral Pulses, No clubbing, cyanosis, or edema  LYMPH: No lymphadenopathy noted  SKIN: No rashes or lesions    Consultant(s) Notes Reviewed:  [x ] YES  [ ] NO  Care Discussed with Consultants/Other Providerscpk [ x] YES  [ ] NO    LABS:                    CBC Full  -  ( 05 Nov 2022 00:04 )  WBC Count : 6.20 K/uL  RBC Count : 5.54 M/uL  Hemoglobin : 13.8 g/dL  Hematocrit : 44.7 %  Platelet Count - Automated : 156 K/uL  Mean Cell Volume : 80.7 fl  Mean Cell Hemoglobin : 24.9 pg  Mean Cell Hemoglobin Concentration : 30.9 gm/dL  Auto Neutrophil # : 3.28 K/uL  Auto Lymphocyte # : 1.84 K/uL  Auto Monocyte # : 0.58 K/uL  Auto Eosinophil # : 0.44 K/uL  Auto Basophil # : 0.05 K/uL  Auto Neutrophil % : 52.8 %  Auto Lymphocyte % : 29.7 %  Auto Monocyte % : 9.4 %  Auto Eosinophil % : 7.1 %  Auto Basophil % : 0.8 %      11-05    141  |  106  |  19  ----------------------------<  111<H>  4.4   |  22  |  0.91    Ca    9.6      05 Nov 2022 00:04  Mg     1.9     11-05    TPro  7.9  /  Alb  4.3  /  TBili  0.4  /  DBili  x   /  AST  26  /  ALT  26  /  AlkPhos  97  11-05          PT/INR - ( 05 Nov 2022 00:04 )   PT: 25.2 sec;   INR: 2.17 ratio         PTT - ( 05 Nov 2022 00:04 )  PTT:34.3 sec  RADIOLOGY & ADDITIONAL TESTS:    Imaging Personally Reviewed:  [ ] YES  [ ] NO

## 2022-11-05 NOTE — CONSULT NOTE ADULT - ATTENDING COMMENTS
code stroke called in ED and neuro emergently assessed patient. not tpa candidate or EVT candidate.    Briefly   51 y/o right handed female with valve transplant, a fib (on coumadin), Left M2 occlusion s/p thrombectomy presenting a code stroke LKW 8:29AM, for left arm tingling/numbness that began at 8:30 AM abruptly. Patient has residual focal deficits from prior left MCA infarct, which include mild right sided weakness (no drift on stroke exam), mild right facial asymmetry, and dysarthria. NIHSS 2. mRS 2, patient needs help bathing and dressing herself since her right hand is contracted, but overall independent and able to walk on her own. Patient denies headache, nausea, vomiting, visual disturbances.     Not a tpa candidate given outside of time window and INR > 1.7 and recent stroke   Not a thrombectomy candidate given no LVO.     INR was 2.1   o/e mild aphasia, r facial, RUE prox 5/5 distally at  3/5; RLE 5/5   Impression: worsening of of L MCA stroke symptoms, worsening R facial and decrease R  strength in setting of subtherapeutic INR.  was 2.1 supposed to be 2.5-3.5     Recommendations:   -C/w coumadin INR goal 2.5-3. and high dose statin therapy LDL goal < 70   - MRI brain wo r/o new large stroke   -  Permissive HTN up to 220/120 mmHg for first 24 hours after the symptom onset followed by gradual normotension.   -  Please send HbA1C and Lipid Panel  -  PT/OT evaluation  -  NPO until clears Dysphagia screen, otherwise Swallow evaluation  -  Stroke education provided  - PT/OT   - check FS, glucose control <180  - GI/DVT ppx  - Counseling on diet, exercise, and medication adherence was done  - Counseling on smoking cessation and alcohol consumption offered when appropriate.  - Pain assessed and judicious use of narcotics when appropriate was discussed.    - Stroke education given when appropriate.  - Importance of fall prevention discussed.   - Differential diagnosis and plan of care discussed with patient and/or family and primary team  - Thank you for allowing me to participate in the care of this patient. Call with questions.   - spoke with CDU team   Chris Montez MD  Vascular Neurology  Office: [phone]

## 2022-11-05 NOTE — ED CDU PROVIDER INITIAL DAY NOTE - PROGRESS NOTE DETAILS
CDU PROGRESS NOTE ALONDRA DICKSON: pt family verified home coumadin/sotalol/lipitor dosing. pt assessed with neuro attending Dr. Montez - persistent R facial droop, RUE weakness and dysarthria, unchanged since yesterday. NAD. VSS. pending MRI, dispo per neuro accordingly. will continue to monitor. CDU PROGRESS NOTE ALONDRA DICKSON: MRI resulted with small acute lacunar infarct of the right prefrontal gyrus with hand knob involvement. Attending aware. Patient aware. discussed with neurology - no further intervention at this time, likely attributed to subtherapeutic INR this week. will assure coumadin compliance. admit for PT/OT eval. stable for admission CDU PROGRESS NOTE ALONDRA DICKSON: pt family verified home coumadin/sotalol/lipitor dosing, family did not have the dosing available overnight. pt assessed with ED attending Dr. Damico and neuro attending Dr. Montez - persistent R facial droop, RUE weakness and dysarthria, unchanged since yesterday. NAD. VSS. pending MRI, dispo per neuro accordingly. will continue to monitor.

## 2022-11-05 NOTE — H&P ADULT - NSHPLABSRESULTS_GEN_ALL_CORE
13.8   6.20  )-----------( 156      ( 05 Nov 2022 00:04 )             44.7       11-05    141  |  106  |  19  ----------------------------<  111<H>  4.4   |  22  |  0.91    Ca    9.6      05 Nov 2022 00:04  Mg     1.9     11-05    TPro  7.9  /  Alb  4.3  /  TBili  0.4  /  DBili  x   /  AST  26  /  ALT  26  /  AlkPhos  97  11-05                  PT/INR - ( 05 Nov 2022 00:04 )   PT: 25.2 sec;   INR: 2.17 ratio         PTT - ( 05 Nov 2022 00:04 )  PTT:34.3 sec    Lactate Trend            CAPILLARY BLOOD GLUCOSE  90 (05 Nov 2022 00:01)      POCT Blood Glucose.: 90 mg/dL (04 Nov 2022 23:58)

## 2022-11-05 NOTE — ED ADULT NURSE NOTE - ED STAT RN HANDOFF DETAILS
Report endorsed to CDU Elle CHIANG. Safety checks completed this shift. Safety rounds completed hourly.  IV sites checked Q2+remains WDL. Medications administered as ordered with no signs/symptoms of adverse reactions. Fall & skin precautions in place. Any issues endorsed to oncoming RN for follow up.

## 2022-11-05 NOTE — CONSULT NOTE ADULT - SUBJECTIVE AND OBJECTIVE BOX
Neurology  Consult Note  11-05-22    Name:  SHARON MCKNIGHT; 52y (15-Trent-1970)    Chief Complaint:   HPI:     52F R-handed PMHx RF s/p valve transplant and Afib (on xarelto; last dose at 1700) who initially presented to [facility] ED with R hand weakness and sudden onset of "not able to talk." Per family, pt was generally feeling unwell all day and tonight, when pt was sitting at the dinner table with her , he noticed that pt was unresponsive and not moving her right hand. Pt was immediately taken to [facility] ED where code stroke was activated. NIHSS of 18 for aphasia and Right hemiparesis. LKN 1907 9/28/22. mRS 0. Pt was not a TPA candidate due to INR 1.97, PTT 39.3. CTA showed Left MCA occlusion in the M2    Review of Systems:  As states in HPI.    IV Contrast (Urticaria)      PMHx:   Rheumatic fever    TIA (transient ischemic attack)    Afib      PFHx:   Family history of acute myocardial infarction (Father)      PSuHx:   S/P mitral valve repair    H/O right knee surgery    S/P MVR (mitral valve replacement)      PSoHx:     Marital Status:  (   )    (   ) Single    (   )    (  )   Occupation:   Lives with: (  ) alone  (  ) children   (  ) spouse   (  ) parents  (  ) other  Recent Travel:     Substance Use (street drugs): (  ) never used  (  ) other:  Tobacco Usage:  (   ) never smoked   (   ) former smoker   (   ) current smoker  (     ) pack year  Alcohol Usage:  Sexual History:         Medications:  MEDICATIONS  (STANDING):  methylPREDNISolone sodium succinate Injectable 40 milliGRAM(s) IV Push Once    MEDICATIONS  (PRN):    Vitals:  T(C): --  HR: 68 (11-04-22 @ 23:45) (68 - 68)  BP: 161/95 (11-04-22 @ 23:45) (161/95 - 161/95)  RR: 18 (11-04-22 @ 23:45) (18 - 18)  SpO2: 100% (11-04-22 @ 23:45) (100% - 100%)    Labs:          CAPILLARY BLOOD GLUCOSE  90 (05 Nov 2022 00:01)      POCT Blood Glucose.: 90 mg/dL (04 Nov 2022 23:58)          CSF:                      PHYSICAL EXAMINATION: incomplete   General: Well-developed, well nourished, in no acute distress.  Eyes: Conjunctiva and sclera clear.  Neurologic:  - Mental Status:  Alert, awake, oriented to person, place, and time; Speech is dysarthric and hypophonic with intact naming, repetition, and comprehension; Good overall fund of knowledge  - Cranial Nerves II-XII:    II:  Visual fields are full to confrontation; Pupils are equal, round, and reactive to light  III, IV, VI:  Extraocular movements are intact without nystagmus.  V:  Facial sensation is intact in the V1-V3 distribution bilaterally.  VII:  Mild right facial asymmetry   VIII:  Hearing is intact to finger rub.  IX, X:  Uvula is midline and soft palate rises symmetrically  XI:  Head turning and shoulder shrug are intact.  XII:  Tongue protudes in the midline.  - Motor:  Strength is 5/5 throughout.  There is no pronator drift.  Normal muscle bulk and tone throughout.  - Sensory:  Intact throughout to light touch, pin prick.  - Coordination:  Finger-nose-finger and heel-knee-shin intact without dysmetria.  Rapid alternating hand movements intact.  - Gait:   Normal steps, base, arm swing, and turning.  Heel and toe walking are normal.  Tandem gait is normal.  Romberg testing is negative.    Radiology:   Neurology  Consult Note  11-05-22    Name:  SHARON MCKNIGHT; 52y (15-Trent-1970)    Chief Complaint:   HPI: 53 y/o right handed female with PMHx valve transplant, a fib, Left M2 occlusion s/p thrombectomy presenting a code stroke LKW 8:29AM, for left arm tingling/numbness that began at 8:30 AM abruptly. Patient has residual focal deficits from prior left MCA infarct, which include mild right sided weakness (no drift on stroke exam), mild right facial asymmetry, and dysarthria. NIHSS 2. mRS 2, patient needs help bathing and dressing herself since her right hand is contracted, but overall independent and able to walk on her own. Patient denies headache, nausea, vomiting, visual disturbances.     Not a tpa candidate given outside of time window.  Not a thrombectomy candidate given no LVO.       Review of Systems:  As states in HPI.    IV Contrast (Urticaria)      PMHx:   Rheumatic fever    TIA (transient ischemic attack)    Afib      PFHx:   Family history of acute myocardial infarction (Father)      PSuHx:   S/P mitral valve repair    H/O right knee surgery    S/P MVR (mitral valve replacement)      Medications:  MEDICATIONS  (STANDING):  methylPREDNISolone sodium succinate Injectable 40 milliGRAM(s) IV Push Once    MEDICATIONS  (PRN):    Vitals:  T(C): --  HR: 68 (11-04-22 @ 23:45) (68 - 68)  BP: 161/95 (11-04-22 @ 23:45) (161/95 - 161/95)  RR: 18 (11-04-22 @ 23:45) (18 - 18)  SpO2: 100% (11-04-22 @ 23:45) (100% - 100%)    Labs:          CAPILLARY BLOOD GLUCOSE  90 (05 Nov 2022 00:01)      POCT Blood Glucose.: 90 mg/dL (04 Nov 2022 23:58)      PHYSICAL EXAMINATION: incomplete   General: Well-developed, well nourished, in no acute distress.  Eyes: Conjunctiva and sclera clear.  Neurologic:  - Mental Status:  Alert, awake, oriented to person, place, and time; Speech is dysarthric and hypophonic with intact naming, repetition, and comprehension; Good overall fund of knowledge  - Cranial Nerves II-XII:    II:  Visual fields are full to confrontation; Pupils are equal, round, and reactive to light  III, IV, VI:  Extraocular movements are intact without nystagmus.  V:  Facial sensation is intact in the V1-V3 distribution bilaterally.  VII:  Mild right facial asymmetry   VIII:  Hearing is intact to finger rub.  IX, X:  Uvula is midline and soft palate rises symmetrically  XI:  Head turning and shoulder shrug are intact.  XII:  Tongue protrudes in the midline.  - Motor:  Strength is 5/5 throughout except RUE 4+.  There is no pronator drift.  Normal muscle bulk and tone throughout.  - Sensory:  Intact throughout to light touch, pin prick.  - Coordination:  Finger-nose-finger intact without dysmetria. Some tremor in right UE  - Gait:   Deferred    Radiology:  CT PERFUSION: No core infarct or penumbra of ischemic tissue is identified by CT perfusion.    CT ANGIOGRAPHY NECK:  1. Patent cervical carotid systems. Mild luminal irregularity in the left carotid bulb and proximal left internal carotid artery. No hemodynamically significant carotid stenosis using NASCET criteria. No evidence of acute dissection.  2. Patent vertebral arteries without stenosis or acute dissection.  3. Heterogeneous thyroid with nodules measuring up to approximately 1 cm. A nonemergent thyroid ultrasound may be obtained as clinically warranted.    CT ANGIOGRAPHY BRAIN: No vessel occlusion, flow-limiting stenosis or aneurysm is identified about the [place]. The dural venous sinuses and cavernous sinuses are patent.   Neurology  Consult Note  11-05-22    Name:  SHARON MCKNIGHT; 52y (15-Trent-1970)    Chief Complaint:   HPI: 51 y/o right handed female with PMHx valve transplant, a fib, Left M2 occlusion s/p thrombectomy presenting a code stroke LKW 8:29AM, for left arm tingling/numbness that began at 8:30 AM abruptly. Patient has residual focal deficits from prior left MCA infarct, which include mild right sided weakness (no drift on stroke exam), mild right facial asymmetry, and dysarthria. NIHSS 2. mRS 2, patient needs help bathing and dressing herself since her right hand is contracted, but overall independent and able to walk on her own. Patient denies headache, nausea, vomiting, visual disturbances.     Not a tpa candidate given outside of time window.  Not a thrombectomy candidate given no LVO.       Review of Systems:  As states in HPI.    IV Contrast (Urticaria)      PMHx:   Rheumatic fever    TIA (transient ischemic attack)    Afib      PFHx:   Family history of acute myocardial infarction (Father)      PSuHx:   S/P mitral valve repair    H/O right knee surgery    S/P MVR (mitral valve replacement)      Medications:  MEDICATIONS  (STANDING):  methylPREDNISolone sodium succinate Injectable 40 milliGRAM(s) IV Push Once    MEDICATIONS  (PRN):    Vitals:  T(C): --  HR: 68 (11-04-22 @ 23:45) (68 - 68)  BP: 161/95 (11-04-22 @ 23:45) (161/95 - 161/95)  RR: 18 (11-04-22 @ 23:45) (18 - 18)  SpO2: 100% (11-04-22 @ 23:45) (100% - 100%)    Labs:          CAPILLARY BLOOD GLUCOSE  90 (05 Nov 2022 00:01)      POCT Blood Glucose.: 90 mg/dL (04 Nov 2022 23:58)      PHYSICAL EXAMINATION: incomplete   General: Well-developed, well nourished, in no acute distress.  Eyes: Conjunctiva and sclera clear.  Neurologic:  - Mental Status:  Alert, awake, oriented to person, place, and time; Speech is dysarthric and hypophonic with intact naming, repetition, and comprehension; Good overall fund of knowledge  - Cranial Nerves II-XII:    II:  Visual fields are full to confrontation; Pupils are equal, round, and reactive to light  III, IV, VI:  Extraocular movements are intact without nystagmus.  V:  Facial sensation is intact in the V1-V3 distribution bilaterally.  VII:  Mild right facial asymmetry   VIII:  Hearing is intact to finger rub.  IX, X:  Uvula is midline and soft palate rises symmetrically  XI:  Head turning and shoulder shrug are intact.  XII:  Tongue protrudes in the midline.  - Motor:  Strength is 5/5 throughout except RUE 4+.  There is no pronator drift.  Normal muscle bulk and tone throughout.  - Sensory:  Intact throughout to light touch, pin prick.  - Coordination:  Finger-nose-finger intact without dysmetria. Some tremor in right UE  - Gait:   Deferred    Radiology:    No CT evidence of acute intracranial pathology.    A moderate to large chronic infarct inthe left middle cerebral artery   vascular territory is grossly stable in size from 09/30/2022, when it was   acute.    CT PERFUSION: No core infarct or penumbra of ischemic tissue is identified by CT perfusion.    CT ANGIOGRAPHY NECK:  1. Patent cervical carotid systems. Mild luminal irregularity in the left carotid bulb and proximal left internal carotid artery. No hemodynamically significant carotid stenosis using NASCET criteria. No evidence of acute dissection.  2. Patent vertebral arteries without stenosis or acute dissection.  3. Heterogeneous thyroid with nodules measuring up to approximately 1 cm. A nonemergent thyroid ultrasound may be obtained as clinically warranted.    CT ANGIOGRAPHY BRAIN: No vessel occlusion, flow-limiting stenosis or aneurysm is identified about the [place]. The dural venous sinuses and cavernous sinuses are patent.

## 2022-11-05 NOTE — ED ADULT NURSE NOTE - OBJECTIVE STATEMENT
Pt presents to the ED A&Ox4 co  L arm numbness and R side facial droop. Pt hx of stroke 9/28. Now presenting with LUE numbness, and exacerbated R side facial droop. LKN: 0830. Pt presents to the ED A&Ox4 co  L arm numbness and R side facial droop. Pt hx of stroke 9/28. Now presenting with LUE numbness, and exacerbated R side facial droop. LKN: 0830. As per family, pt possess R sided weakness, facial asymmetry, mild dysrthria along w R hand contraction residual from previous stroke. Follows up w neurolgist. Pt presenting w steady gait at this time. Denies pain, discomfort, fevers, chills, ams, LOC, blurry vision, HAs. Pt presents to the ED A&Ox4 co  L arm numbness and R side facial droop. Pt hx of stroke 9/28. Now presenting with LUE numbness, and exacerbated R side facial droop. LKN: 0830. As per family, pt possess R sided weakness, facial asymmetry, mild dysrthria along w R hand contraction residual from previous stroke. Follows up w neurologist. Pt presenting w steady gait at this time. Denies pain, discomfort, fevers, chills, ams, LOC, blurry vision, HAs.

## 2022-11-05 NOTE — CONSULT NOTE ADULT - ASSESSMENT
51 y/o right handed female with PMHx tissue mitral valve transplant,  a fib, Left M2 occlusion s/p thrombectomy, h/o multiple sclerosis presenting a code stroke LKW 8:29AM, for left arm tingling/numbness that began at 8:30 AM abruptly yesterday.  MRI shows new findings of acute CVA.  INR was therapeutic.  Continue coumadin with now a goal of INR 2.5-3.0  Continue sotalol for paroxysmal AF, patient remains in NSR  Plan as per neurology.  Will follow with you
53 y/o right handed female with PMHx valve transplant, a fib (on coumadin), Left M2 occlusion s/p thrombectomy presenting a code stroke LKW 8:29AM, for left arm tingling/numbness that began at 8:30 AM abruptly. Patient has residual focal deficits from prior left MCA infarct, which include mild right sided weakness (no drift on stroke exam), mild right facial asymmetry, and dysarthria. NIHSS 2. mRS 2, patient needs help bathing and dressing herself since her right hand is contracted, but overall independent and able to walk on her own. Patient denies headache, nausea, vomiting, visual disturbances.     Not a tpa candidate given outside of time window.  Not a thrombectomy candidate given no LVO.     Impression: No sensory deficits and subjective tingling with unremarkable neuro imaging less likely acute infarct. Would r/o vascular etiology, however give multiple vascular risk factors. R/o toxic metabolic infectious triggers as well.     Recommendations:   []C/w coumadin INR goal 2.5-3.5   [] CDU for MRI brain wo   []  Permissive HTN up to 220/120 mmHg for first 24 hours after the symptom onset followed by gradual normotension.   []  Please send HbA1C and Lipid Panel  []  PT/OT evaluation  []  NPO until clears Dysphagia screen, otherwise Swallow evaluation  []  Stroke education provided    Case to be seen by vascular neurologist.

## 2022-11-05 NOTE — ED CDU PROVIDER INITIAL DAY NOTE - NS ED ROS FT
Constitutional: No fever or chills  Eyes: No visual changes, no eye pain   CV: No chest pain or lower extremity edema  Resp: No SOB no cough  GI: No abd pain. No nausea or vomiting. No diarrhea.   : No dysuria, hematuria.   MSK: No musculoskeletal pain  Skin: No rash  Psych: No complaints   Neuro: No headache. + numbness  +weakness.  Endo: No known diabetes

## 2022-11-05 NOTE — ED CDU PROVIDER INITIAL DAY NOTE - MEDICAL DECISION MAKING DETAILS
Attending Yuval:  53 y/o f previous hx of stroke in sept r sided weakness, dysarthria since incident, presenting w/ r sided facial weakness, worsening hand weakness, unable to open  now, and tingling in left side of arm, last normal at 08;30 normal before, in ed as stroke alert, afebrile, bg 90's, r sided hand weakness, pt has slurred speech, difficulty articulating, stroke continued, ct unremarkable pt to be placed in cdu for mri

## 2022-11-05 NOTE — ED CDU PROVIDER DISPOSITION NOTE - NSFOLLOWUPINSTRUCTIONS_ED_ALL_ED_FT
Hydrate.     Continue your home medications.     We recommend you follow up with your primary care provider within the next 2-3 days, please bring all of your results with you. You can also follow up with your neurologist within the next week.     Please return to the Emergency Department with new, worsening, or concerning symptoms, such as:  -Shortness of breath or trouble breathing  -Pressure, pain, tightness in chest  -Facial drooping, arm weakness, or speech difficulty   -Head injury or loss of consciousness   -Nonstop bleeding or an open wound     *More detailed information regarding your visit and discharge can be found by reviewing this packet Hydrate.     Continue your home medications.     You CT scan showed thyroid nodules. A thyroid ultrasound is recommended. See full CT report attached.     We recommend you follow up with your primary care provider within the next 2-3 days, please bring all of your results with you. You can also follow up with your neurologist within the next week.     Please return to the Emergency Department with new, worsening, or concerning symptoms, such as:  -Shortness of breath or trouble breathing  -Pressure, pain, tightness in chest  -Facial drooping, arm weakness, or speech difficulty   -Head injury or loss of consciousness   -Nonstop bleeding or an open wound     *More detailed information regarding your visit and discharge can be found by reviewing this packet Partial Purse String (Simple) Text: Given the location of the defect and the characteristics of the surrounding skin a simple purse string closure was deemed most appropriate.  Undermining was performed circumfirentially around the surgical defect.  A purse string suture was then placed and tightened. Wound tension only allowed a partial closure of the circular defect.

## 2022-11-05 NOTE — ED CDU PROVIDER DISPOSITION NOTE - CLINICAL COURSE
52 year old F with PMH Rheumatic fever s/p valve transplant and Afib (on coumadin), stroke (Left MCA M2 segment occlusion s/p thrombectomy) Sep 2022 presenting with RUE weakness and LUE numbness. Last known normal 830 am yesterday. Patient has been experiencing symptoms on and off since. Is normally able to fully open her right fist, but has been having difficulty. Also endorses numbness in LUE. Per  at bedside, noticed increased right sided facial weakness today. Has residual R sided weakness and dysarthria from prior stroke. Has no lower extremity complaints. Denies gait abnormalities, HA, vision changes, fevers/chills, CP, SOB. Follows with speech therapy and occupational therapy.   ED course: CT head negative for acute pathology. Neuro consulted, recommending MRI. Plan for imaging, neuro checks, and close observation in CDU. 52 year old F with PMH Rheumatic fever s/p valve transplant and Afib (on coumadin), stroke (Left MCA M2 segment occlusion s/p thrombectomy) Sep 2022 presenting with RUE weakness and LUE numbness. Last known normal 830 am yesterday. Patient has been experiencing symptoms on and off since. Is normally able to fully open her right fist, but has been having difficulty. Also endorses numbness in LUE. Per  at bedside, noticed increased right sided facial weakness today. Has residual R sided weakness and dysarthria from prior stroke. Has no lower extremity complaints. Denies gait abnormalities, HA, vision changes, fevers/chills, CP, SOB. Follows with speech therapy and occupational therapy.   ED course: CT head negative for acute pathology. Neuro consulted, recommending MRI. Plan for imaging, neuro checks, and close observation in CDU.  In CDU pt had stable neuro checks, stable vitals. no events on tele. pt was evaluated by stroke service with Dr. Montez. Pt had MRI which resulted with small acute lacunar infarct of the right prefrontal gyrus with hand knob involvement. Attending aware. Patient aware. discussed with neurology - no further intervention at this time, likely attributed to subtherapeutic INR this week. will assure coumadin compliance. admit for PT/OT eval. stable for admission

## 2022-11-06 ENCOUNTER — TRANSCRIPTION ENCOUNTER (OUTPATIENT)
Age: 52
End: 2022-11-06

## 2022-11-06 VITALS
HEART RATE: 61 BPM | SYSTOLIC BLOOD PRESSURE: 116 MMHG | RESPIRATION RATE: 17 BRPM | DIASTOLIC BLOOD PRESSURE: 89 MMHG | OXYGEN SATURATION: 100 % | TEMPERATURE: 98 F

## 2022-11-06 LAB
INR BLD: 3.33 RATIO — HIGH (ref 0.88–1.16)
PROTHROM AB SERPL-ACNC: 39.1 SEC — HIGH (ref 10.5–13.4)

## 2022-11-06 PROCEDURE — 0042T: CPT | Mod: MA

## 2022-11-06 PROCEDURE — 84295 ASSAY OF SERUM SODIUM: CPT

## 2022-11-06 PROCEDURE — 99291 CRITICAL CARE FIRST HOUR: CPT | Mod: 25

## 2022-11-06 PROCEDURE — 85025 COMPLETE CBC W/AUTO DIFF WBC: CPT

## 2022-11-06 PROCEDURE — 80061 LIPID PANEL: CPT

## 2022-11-06 PROCEDURE — 82435 ASSAY OF BLOOD CHLORIDE: CPT

## 2022-11-06 PROCEDURE — 83605 ASSAY OF LACTIC ACID: CPT

## 2022-11-06 PROCEDURE — 82330 ASSAY OF CALCIUM: CPT

## 2022-11-06 PROCEDURE — 96374 THER/PROPH/DIAG INJ IV PUSH: CPT

## 2022-11-06 PROCEDURE — 70551 MRI BRAIN STEM W/O DYE: CPT | Mod: MG

## 2022-11-06 PROCEDURE — 70498 CT ANGIOGRAPHY NECK: CPT | Mod: MA

## 2022-11-06 PROCEDURE — 85018 HEMOGLOBIN: CPT

## 2022-11-06 PROCEDURE — 82803 BLOOD GASES ANY COMBINATION: CPT

## 2022-11-06 PROCEDURE — 36415 COLL VENOUS BLD VENIPUNCTURE: CPT

## 2022-11-06 PROCEDURE — 85014 HEMATOCRIT: CPT

## 2022-11-06 PROCEDURE — 85610 PROTHROMBIN TIME: CPT

## 2022-11-06 PROCEDURE — 70450 CT HEAD/BRAIN W/O DYE: CPT | Mod: MA

## 2022-11-06 PROCEDURE — 83735 ASSAY OF MAGNESIUM: CPT

## 2022-11-06 PROCEDURE — 84132 ASSAY OF SERUM POTASSIUM: CPT

## 2022-11-06 PROCEDURE — 87637 SARSCOV2&INF A&B&RSV AMP PRB: CPT

## 2022-11-06 PROCEDURE — 80053 COMPREHEN METABOLIC PANEL: CPT

## 2022-11-06 PROCEDURE — 97161 PT EVAL LOW COMPLEX 20 MIN: CPT

## 2022-11-06 PROCEDURE — 82947 ASSAY GLUCOSE BLOOD QUANT: CPT

## 2022-11-06 PROCEDURE — 83036 HEMOGLOBIN GLYCOSYLATED A1C: CPT

## 2022-11-06 PROCEDURE — 97165 OT EVAL LOW COMPLEX 30 MIN: CPT

## 2022-11-06 PROCEDURE — 84484 ASSAY OF TROPONIN QUANT: CPT

## 2022-11-06 PROCEDURE — G0378: CPT

## 2022-11-06 PROCEDURE — 82962 GLUCOSE BLOOD TEST: CPT

## 2022-11-06 PROCEDURE — G1004: CPT

## 2022-11-06 PROCEDURE — 85730 THROMBOPLASTIN TIME PARTIAL: CPT

## 2022-11-06 PROCEDURE — 70496 CT ANGIOGRAPHY HEAD: CPT | Mod: MA

## 2022-11-06 PROCEDURE — 84702 CHORIONIC GONADOTROPIN TEST: CPT

## 2022-11-06 RX ORDER — APIXABAN 2.5 MG/1
1.5 TABLET, FILM COATED ORAL
Qty: 0 | Refills: 0 | DISCHARGE

## 2022-11-06 RX ORDER — SENNA PLUS 8.6 MG/1
1 TABLET ORAL ONCE
Refills: 0 | Status: COMPLETED | OUTPATIENT
Start: 2022-11-06 | End: 2022-11-06

## 2022-11-06 RX ORDER — WARFARIN SODIUM 2.5 MG/1
3 TABLET ORAL ONCE
Refills: 0 | Status: DISCONTINUED | OUTPATIENT
Start: 2022-11-06 | End: 2022-11-06

## 2022-11-06 RX ORDER — SENNA PLUS 8.6 MG/1
1 TABLET ORAL
Qty: 0 | Refills: 0 | DISCHARGE
Start: 2022-11-06

## 2022-11-06 RX ORDER — SENNA PLUS 8.6 MG/1
1 TABLET ORAL AT BEDTIME
Refills: 0 | Status: DISCONTINUED | OUTPATIENT
Start: 2022-11-06 | End: 2022-11-06

## 2022-11-06 RX ORDER — APIXABAN 2.5 MG/1
1 TABLET, FILM COATED ORAL
Qty: 0 | Refills: 0 | DISCHARGE

## 2022-11-06 RX ORDER — WARFARIN SODIUM 2.5 MG/1
1 TABLET ORAL
Qty: 0 | Refills: 0 | DISCHARGE
Start: 2022-11-06

## 2022-11-06 RX ORDER — POLYETHYLENE GLYCOL 3350 17 G/17G
17 POWDER, FOR SOLUTION ORAL
Qty: 0 | Refills: 0 | DISCHARGE
Start: 2022-11-06

## 2022-11-06 RX ORDER — POLYETHYLENE GLYCOL 3350 17 G/17G
17 POWDER, FOR SOLUTION ORAL DAILY
Refills: 0 | Status: DISCONTINUED | OUTPATIENT
Start: 2022-11-06 | End: 2022-11-06

## 2022-11-06 RX ORDER — WARFARIN SODIUM 2.5 MG/1
1 TABLET ORAL
Qty: 30 | Refills: 0
Start: 2022-11-06 | End: 2022-12-05

## 2022-11-06 RX ADMIN — Medication 80 MILLIGRAM(S): at 10:35

## 2022-11-06 RX ADMIN — SENNA PLUS 1 TABLET(S): 8.6 TABLET ORAL at 10:34

## 2022-11-06 RX ADMIN — POLYETHYLENE GLYCOL 3350 17 GRAM(S): 17 POWDER, FOR SOLUTION ORAL at 10:40

## 2022-11-06 NOTE — DISCHARGE NOTE PROVIDER - CARE PROVIDERS DIRECT ADDRESSES
,DirectAddress_Unknown,DirectAddress_Unknown,[email or web address]

## 2022-11-06 NOTE — OCCUPATIONAL THERAPY INITIAL EVALUATION ADULT - PERTINENT HX OF CURRENT PROBLEM, REHAB EVAL
53 y/o  female with PMHx tissue mitral valve transplant,  a fib, Left M2 occlusion s/p thrombectomy, h/o multiple sclerosis presenting a code stroke LKW 8:29AM, for left arm tingling/numbness that began yesterday in am   Patient has residual focal deficits from prior left MCA infarct, which include mild right sided weakness mild right facial asymmetry (new), and dysarthria.   Patient needs help bathing and dressing herself since her right hand is contracted, but overall independent and able to walk on her own. Patient denies headache, nausea, vomiting, visual disturbances.

## 2022-11-06 NOTE — DISCHARGE NOTE PROVIDER - NSDCFUSCHEDAPPT_GEN_ALL_CORE_FT
Jesus Luevano  [facility]  ELECTROPH [address]  Scheduled Appointment: 11/08/2022

## 2022-11-06 NOTE — DISCHARGE NOTE PROVIDER - NSDCCPCAREPLAN_GEN_ALL_CORE_FT
PRINCIPAL DISCHARGE DIAGNOSIS  Diagnosis: Suspected cerebrovascular accident (CVA)  Assessment and Plan of Treatment: You have been diagnosed with  a small acute infarct in the right precentral gyrus involing the hand knob as well as a subacute left posterior MCA territory infarct likely due to a subtherapeutic INR.    - Please continue to take your blood thinner Coumadin daily, your goal INR is 2.5-3.5 due to your history of a mitral valve transplant.  Do not skip doses and do not run low on your medication. call your doctor if you need refills    - Please continue with outpatient physcial therapy and occupational therapy.   - Avoid smoking and do not let anyone smoke next to you. If you are a smoker and find it hard to smoke seek help or call your doctors for referrals for counselling programs   - Follow up with your doctor as outpatient. he may prescribe regular lab work to keep track of your cholesterol and sugar levels in your blood. Do not skip appointments and always be compliant wit your doctor's advise  - Call 911 or report to the emergency department if you have sudden onset severe headache, vision problems such as blurry or double vision or vision that is going dark, vertigo, numbness or weakness anywhere in your body, slurred speech or difficulty walking      SECONDARY DISCHARGE DIAGNOSES  Diagnosis: Subtherapeutic international normalized ratio (INR)  Assessment and Plan of Treatment: Please continue Coumadin daily. Follow up with your cardiologist within 1-2 days to have your INR checked. On discharge your INR is therapeutic at 3.3.

## 2022-11-06 NOTE — DISCHARGE NOTE PROVIDER - CARE PROVIDER_API CALL
Osmany Alarcon  INTERNAL MEDICINE  [address], [address]  [place] [address]  Phone: [phone]  Fax: [phone]  Follow Up Time:     Chris Montez)  Neurology; Vascular Neurology  [address], [address]  [place] [address]  Phone: [phone]  Fax: [phone]  Follow Up Time:     Jesus Luevano)  Cardiac Electrophysiology; Cardiology  [address] [place] [address]  Phone: [phone]  Fax: [phone]  Follow Up Time:

## 2022-11-06 NOTE — PHYSICAL THERAPY INITIAL EVALUATION ADULT - PERTINENT HX OF CURRENT PROBLEM, REHAB EVAL
53 y/o  female with PMHx tissue mitral valve transplant,  a fib previously on Eliquis switched to coumadin last week , Left MCA M2 occlusion s/p thrombectomy, h/o multiple sclerosis presenting a code stroke LKW 8:29AM, for left arm tingling/numbness that began yesterday in am Patient has residual focal deficits from prior left MCA infarct, which include mild right sided weakness mild right facial asymmetry (new), and dysarthria. Patient needs help bathing and dressing herself since her right hand is contracted, but overall independent and able to walk on her own. CT head/ CTA neg Dx MRI +Small acute infarct in the right precentral gyrus involving the hand knob. -Subacute left posterior MCA territory infarct again seen. Multiple chronic infarcts as described above

## 2022-11-06 NOTE — OCCUPATIONAL THERAPY INITIAL EVALUATION ADULT - ADDITIONAL COMMENTS
Pt lives in pvt home w/  +3STE and flight to bedrooms, has a tub shower, no AE/DME. PTA was independent however post CVA was receiving assist from . Pt currently at outpatient SLP/PT/OT.

## 2022-11-06 NOTE — DISCHARGE NOTE PROVIDER - PROVIDER TOKENS
PROVIDER:[TOKEN:[[id number]]],PROVIDER:[TOKEN:[[id number]]],PROVIDER:[TOKEN:[[id number]]]

## 2022-11-06 NOTE — PROGRESS NOTE ADULT - ASSESSMENT
53 y/o  female with PMHx tissue mitral valve transplant,  a fib, Left M2 occlusion s/p thrombectomy, h/o multiple sclerosis presenting a code stroke LKW 8:29AM, for left arm tingling/numbness that began yesterday in am   Patient has residual focal deficits from prior left MCA infarct, which include mild right sided weakness mild right facial asymmetry (new), and dysarthria.   pt w/ new stroke on MRI  neuro eval noted  inr therapeutic  pt changed to coumadin last week  hx a fib  in s r   pt  c/w meds  cards eval noted  neuro f/u  d/c as cleared by specialists

## 2022-11-06 NOTE — PHYSICAL THERAPY INITIAL EVALUATION ADULT - ADDITIONAL COMMENTS
pt lives with her family in pvt house, 2-3STE, flight of stairs inside to bedroom, Pt stated she's Independent in ADls/functional ambulation w/o using any ADs. Spouse assists as needed.

## 2022-11-06 NOTE — DISCHARGE NOTE PROVIDER - NSDCMRMEDTOKEN_GEN_ALL_CORE_FT
atorvastatin 80 mg oral tablet: 1 tab(s) orally once a day (at bedtime)  polyethylene glycol 3350 oral powder for reconstitution: 17 gram(s) orally once a day  senna leaf extract oral tablet: 1 tab(s) orally once a day (at bedtime)  sotalol 80 mg oral tablet: 1 tab(s) orally every 12 hours  warfarin 3 mg oral tablet: 1 tab(s) orally once a day

## 2022-11-06 NOTE — PROGRESS NOTE ADULT - ASSESSMENT
51 y/o right handed female with valve transplant, a fib (on coumadin), Left M2 occlusion s/p thrombectomy presenting a code stroke LKW 8:29AM, for left arm tingling/numbness that began at 8:30 AM abruptly. Patient has residual focal deficits from prior left MCA infarct, which include mild right sided weakness (no drift on stroke exam), mild right facial asymmetry, and dysarthria. NIHSS 2. mRS 2, patient needs help bathing and dressing herself since her right hand is contracted, but overall independent and able to walk on her own. Patient denies headache, nausea, vomiting, visual disturbances.     Not a tpa candidate given outside of time window and INR > 1.7 and recent stroke   Not a thrombectomy candidate given no LVO.     INR was 2.1   o/e mild aphasia, r facial, RUE prox 5/5 distally at  3/5; RLE 5/5   A1c 5.8  LDL 62   MRI with + R ppre central gyrus infarct inoovlving the hand knob, subacute L mCA stroke      Impression: worsening of of L MCA stroke symptoms, worsening R facial and decrease R  strength in setting of subtherapeutic INR.  was 2.1 supposed to be 2.5-3.5 ; stroke 2/2 subtherapeutic INR     Recommendations:   -C/w coumadin INR goal 2.5-3. and high dose statin therapy LDL goal < 70 ; now INR at goal.     -  Permissive HTN up to 220/120 mmHg for first 24 hours after the symptom onset followed by gradual normotension.   -  PT/OT evaluation  -  Stroke education provided  - PT/OT   - check FS, glucose control <180  - GI/DVT ppx  - Thank you for allowing me to participate in the care of this patient. Call with questions.   - no objection to d/c planning, may benefit from rehab vs outpatient PT   Chris Montez MD  Vascular Neurology  Office: [phone] .

## 2022-11-06 NOTE — PROGRESS NOTE ADULT - ASSESSMENT
53 y/o right handed female with PMHx tissue mitral valve transplant,  a fib, Left M2 occlusion s/p thrombectomy, h/o multiple sclerosis presenting a code stroke LKW 8:29AM, for left arm tingling/numbness that began at 8:30 AM abruptly yesterday.  MRI shows new findings of acute CVA.  INR is 3.33.  Continue coumadin with now a goal of INR 2.5-3.0  Continue sotalol for paroxysmal AF, patient remains in NSR  Plan as per neurology.

## 2022-11-06 NOTE — DISCHARGE NOTE NURSING/CASE MANAGEMENT/SOCIAL WORK - NSDCPEFALRISK_GEN_ALL_CORE
For information on Fall & Injury Prevention, visit: [email or web address] OR  [email or web address] OR  https://www.cdc.gov/steadi/patient.html

## 2022-11-06 NOTE — OCCUPATIONAL THERAPY INITIAL EVALUATION ADULT - STRENGTHENING, PT EVAL
GOAL: Pt will improve RUE strength by half a muscle grade in order to improve ADL skills in 4 weeks.

## 2022-11-06 NOTE — DISCHARGE NOTE NURSING/CASE MANAGEMENT/SOCIAL WORK - PATIENT PORTAL LINK FT
You can access the FollowMyHealth Patient Portal offered by [facility] by registering at the following website: [email or web address]. By joining [facility]’s FollowMyHealth portal, you will also be able to view your health information using other applications (apps) compatible with our system.

## 2022-11-06 NOTE — OCCUPATIONAL THERAPY INITIAL EVALUATION ADULT - DIAGNOSIS, OT EVAL
Pt demonstrates decreased RUE strength and ROM, and decreased cognition limiting ADLs and transfers.

## 2022-11-06 NOTE — PROGRESS NOTE ADULT - SUBJECTIVE AND OBJECTIVE BOX
DATE OF SERVICE: 11-06-22 @ 13:27  CHIEF COMPLAINT:Patient is a 52y old  Female who presents with a chief complaint of CVA (06 Nov 2022 08:27)    	        PAST MEDICAL & SURGICAL HISTORY:  Rheumatic fever  as a teen, s/p mitral valve repair 1991      TIA (transient ischemic attack)  on Aspirin      Afib      S/P mitral valve repair  1991      H/O right knee surgery      S/P MVR (mitral valve replacement)  Re op MVR (T)              REVIEW OF SYSTEMS:  CONSTITUTIONAL: No fever, weight loss, or fatigue  EYES: No eye pain, visual disturbances, or discharge  NECK: No pain or stiffness  RESPIRATORY: No cough, wheezing, chills or hemoptysis; No Shortness of Breath  CARDIOVASCULAR: No chest pain, palpitations, passing out, dizziness, or leg swelling  GASTROINTESTINAL: No abdominal or epigastric pain. No nausea, vomiting, or hematemesis; No diarrhea or constipation. No melena or hematochezia.  GENITOURINARY: No dysuria, frequency, hematuria, or incontinence  NEUROLOGICAL: dyarthria  ruext weakness  Medications:  MEDICATIONS  (STANDING):  atorvastatin 80 milliGRAM(s) Oral daily  polyethylene glycol 3350 17 Gram(s) Oral daily  senna 1 Tablet(s) Oral at bedtime  sotalol. 80 milliGRAM(s) Oral every 12 hours  warfarin 3 milliGRAM(s) Oral once    MEDICATIONS  (PRN):    	    PHYSICAL EXAM:  T(C): 36.6 (11-06-22 @ 12:00), Max: 36.8 (11-06-22 @ 08:06)  HR: 66 (11-06-22 @ 12:20) (62 - 67)  BP: 117/74 (11-06-22 @ 12:20) (85/52 - 134/82)  RR: 20 (11-06-22 @ 12:00) (16 - 20)  SpO2: 100% (11-06-22 @ 12:20) (96% - 100%)  Wt(kg): --  I&O's Summary      Appearance: Normal	  HEENT:   Normal oral mucosa, PERRL, EOMI	  Lymphatic: No lymphadenopathy  Cardiovascular: Normal S1 S2, No JVD, No murmurs, No edema  Respiratory: Lungs clear to auscultation	  Psychiatry: A & O x 3, Mood & affect appropriate  Gastrointestinal:  Soft, Non-tender, + BS	  Skin: No rashes, No ecchymoses, No cyanosis	  Neurologic: ruext weakness  dysarthric  TELEMETRY: 	    ECG:  	  RADIOLOGY:  OTHER: 	  	  LABS:	 	    CARDIAC MARKERS:                                13.8   6.20  )-----------( 156      ( 05 Nov 2022 00:04 )             44.7     11-05    141  |  106  |  19  ----------------------------<  111<H>  4.4   |  22  |  0.91    Ca    9.6      05 Nov 2022 00:04  Mg     1.9     11-05    TPro  7.9  /  Alb  4.3  /  TBili  0.4  /  DBili  x   /  AST  26  /  ALT  26  /  AlkPhos  97  11-05    proBNP:   Lipid Profile:   HgA1c:   TSH:     	        
Neurology Progress Note    S: Patient seen and examined. No new events overnight. patient denied CP, SOB, HA or pain. no neuro change.  admitted     Medication:  atorvastatin 80 milliGRAM(s) Oral daily  polyethylene glycol 3350 17 Gram(s) Oral daily  senna 1 Tablet(s) Oral at bedtime  sotalol. 80 milliGRAM(s) Oral every 12 hours      Vitals:  Vital Signs Last 24 Hrs  T(C): 36.8 (06 Nov 2022 08:06), Max: 36.8 (06 Nov 2022 08:06)  T(F): 98.2 (06 Nov 2022 08:06), Max: 98.2 (06 Nov 2022 08:06)  HR: 62 (06 Nov 2022 08:06) (62 - 67)  BP: 132/83 (06 Nov 2022 08:06) (85/52 - 134/82)  BP(mean): 77 (06 Nov 2022 05:00) (77 - 98)  RR: 16 (06 Nov 2022 08:06) (16 - 18)  SpO2: 100% (06 Nov 2022 08:06) (96% - 100%)    Parameters below as of 06 Nov 2022 08:06  Patient On (Oxygen Delivery Method): room air        General Exam:   General Appearance: Appropriately dressed and in no acute distress       Head: Normocephalic, atraumatic and no dysmorphic features  Ear, Nose, and Throat: Moist mucous membranes  CVS: S1S2+  Resp: No SOB, no wheeze or rhonchi  Abd: soft NTND  Extremities: No edema, no cyanosis  Skin: No bruises, no rashes     Neurological Exam:  Mental Status: Awake, alert and oriented x 3.  Able to follow simple and complex verbal commands. Able to name and repeat. fluent speech, mild aphasia   Cranial Nerves: PERRL, EOMI, VFFC, sensation V1-V3 intact,  R facial  , equal elevation of palate, scm/trap 5/5, tongue is midline on protrusion. no obvious papilledema on fundoscopic exam. Hearing is grossly intact.   Motor: Normal bulk, tone and strength throughout except RUE drift and distally at  3/5.    Sensation: Intact to light touch and pinprick throughout. no right/left confusion. no extinction to tactile on DSS.   Reflexes: 1+ throughout at biceps, brachioradialis, triceps, patellars and ankles bilaterally and equal. No clonus. R toe and L toe were both downgoing.  Coordination: No dysmetria on FNF on L   Gait: deferred     I personally reviewed the below data/images/labs:      CBC Full  -  ( 05 Nov 2022 00:04 )  WBC Count : 6.20 K/uL  RBC Count : 5.54 M/uL  Hemoglobin : 13.8 g/dL  Hematocrit : 44.7 %  Platelet Count - Automated : 156 K/uL  Mean Cell Volume : 80.7 fl  Mean Cell Hemoglobin : 24.9 pg  Mean Cell Hemoglobin Concentration : 30.9 gm/dL  Auto Neutrophil # : 3.28 K/uL  Auto Lymphocyte # : 1.84 K/uL  Auto Monocyte # : 0.58 K/uL  Auto Eosinophil # : 0.44 K/uL  Auto Basophil # : 0.05 K/uL  Auto Neutrophil % : 52.8 %  Auto Lymphocyte % : 29.7 %  Auto Monocyte % : 9.4 %  Auto Eosinophil % : 7.1 %  Auto Basophil % : 0.8 %    11-05    141  |  106  |  19  ----------------------------<  111<H>  4.4   |  22  |  0.91    Ca    9.6      05 Nov 2022 00:04  Mg     1.9     11-05    TPro  7.9  /  Alb  4.3  /  TBili  0.4  /  DBili  x   /  AST  26  /  ALT  26  /  AlkPhos  97  11-05    LIVER FUNCTIONS - ( 05 Nov 2022 00:04 )  Alb: 4.3 g/dL / Pro: 7.9 g/dL / ALK PHOS: 97 U/L / ALT: 26 U/L / AST: 26 U/L / GGT: x           PT/INR - ( 06 Nov 2022 05:16 )   PT: 39.1 sec;   INR: 3.33 ratio         PTT - ( 05 Nov 2022 00:04 )  PTT:34.3 sec    No CT evidence of acute intracranial pathology.    A moderate to large chronic infarct inthe left middle cerebral artery   vascular territory is grossly stable in size from 09/30/2022, when it was   acute.    CT PERFUSION: No core infarct or penumbra of ischemic tissue is identified by CT perfusion.    CT ANGIOGRAPHY NECK:  1. Patent cervical carotid systems. Mild luminal irregularity in the left carotid bulb and proximal left internal carotid artery. No hemodynamically significant carotid stenosis using NASCET criteria. No evidence of acute dissection.  2. Patent vertebral arteries without stenosis or acute dissection.  3. Heterogeneous thyroid with nodules measuring up to approximately 1 cm. A nonemergent thyroid ultrasound may be obtained as clinically warranted.    CT ANGIOGRAPHY BRAIN: No vessel occlusion, flow-limiting stenosis or aneurysm is identified about the [place]. The dural venous sinuses and cavernous sinuses are patent.    < from: MR Head No Cont (11.05.22 @ 10:49) >    ACC: [id number] EXAM:  MR BRAIN                          PROCEDURE DATE:  11/05/2022          INTERPRETATION:  CLINICAL INDICATION: Weakness, numbness. Stroke code.    TECHNIQUE: Multi-planar multi-sequential MR imaging of the brain was   performed without intravenous contrast.    COMPARISON: CT head 11/5/2022. MRI brain 9/30/2022.    FINDINGS:  A small acute infarct is seen in the right precentral gyrus involving the   hand knob.    Subacute left posterior MCA territory infarct is seen with resolving   diffusion restriction since comparison study. Chronic right parietal   lobe, left cerebellar, and left basal ganglia infarcts. No acute   intracranial hemorrhage.  Scattered FLAIR hyperintense white matter foci, compatible with chronic   smallvessel disease.  No hydrocephalus. No extra-axial fluid collections. The skull base flow   voids are present.    The visualized intraorbital contents are normal. Mild mucosal thickening   in the bilateral maxillary sinuses. The mastoid air cells are clear. The   visualized osseous structures, soft tissues and partially visualized   parotid glands appear normal.    IMPRESSION:    -Small acute infarct in the right precentral gyrus involving the hand   knob.  -Subacute left posterior MCA territory infarct again seen. Multiple   chronic infarcts as described above.  -No acute intracranial hemorrhage.    --- End of Report ---            OG CABAN MD; Attending Radiologist  This document has been electronically signed. Nov 5 2022 11:01AM    < end of copied text >    
SUBJECTIVE: No clinical change  	  MEDICATIONS:  sotalol. 80 milliGRAM(s) Oral every 12 hours  atorvastatin 80 milliGRAM(s) Oral daily        REVIEW OF SYSTEMS:    CONSTITUTIONAL: No fever, weight loss, or fatigue  EYES: No eye pain, visual disturbances, or discharge  NECK: No pain or stiffness  RESPIRATORY: No cough, wheezing, chills or hemoptysis; No Shortness of Breath  CARDIOVASCULAR: No chest pain, palpitations, dizziness, or leg swelling  GASTROINTESTINAL: No abdominal or epigastric pain. No nausea, vomiting, or hematemesis; No diarrhea or constipation. No melena or hematochezia.  GENITOURINARY: No dysuria, frequency, hematuria, or incontinence  NEUROLOGICAL: No headaches, memory loss, loss of strength, numbness, or tremors  SKIN: No itching, burning, rashes, or lesions   LYMPH Nodes: No enlarged glands  MUSCULOSKELETAL: No joint pain or swelling; No muscle, back, or extremity pain  All other review of systems are negative.  	  [ ] Unable to obtain    PHYSICAL EXAM:  T(C): 36.8 (11-06-22 @ 08:06), Max: 36.8 (11-06-22 @ 08:06)  HR: 62 (11-06-22 @ 08:06) (62 - 67)  BP: 132/83 (11-06-22 @ 08:06) (85/52 - 134/82)  RR: 16 (11-06-22 @ 08:06) (16 - 18)  SpO2: 100% (11-06-22 @ 08:06) (96% - 100%)  Wt(kg): --  I&O's Summary        PHYSICAL EXAM    Appearance: Normal	  HEENT:   Normal oral mucosa, PERRL, EOMI	  NECK: Soft and supple, No LAD, No JVD  Cardiovascular: Regular Rate and Rhythm, Normal S1 S2, No murmurs, No clicks, gallops or rubs  Respiratory: Lungs clear to auscultation	  Gastrointestinal:  Soft, Non-tender, + BS	  Skin: No rashes, No ecchymoses, No cyanosis  Neurologic: Non-focal  Extremities: No clubbing, cyanosis or edema  Vascular: Peripheral pulses palpable 2+ bilaterally    TELEMETRY: 	    ECG:  	  RADIOLOGY  DIAGNOSTIC TESTING:  [ ] Echocardiogram:  [ ] Catheterization:  [ ] Stress Test:    OTHER: 	    LABS:	 	    CARDIAC MARKERS:                                  13.8   6.20  )-----------( 156      ( 05 Nov 2022 00:04 )             44.7     11-05    141  |  106  |  19  ----------------------------<  111<H>  4.4   |  22  |  0.91    Ca    9.6      05 Nov 2022 00:04  Mg     1.9     11-05    TPro  7.9  /  Alb  4.3  /  TBili  0.4  /  DBili  x   /  AST  26  /  ALT  26  /  AlkPhos  97  11-05      Prothrombin Time and INR, Plasma in AM (11.06.22 @ 05:16)   Prothrombin Time, Plasma: 39.1 sec   INR: 3.33:

## 2022-11-06 NOTE — ED ADULT NURSE REASSESSMENT NOTE - NS ED NURSE REASSESS COMMENT FT1
0922 Pt provided breakfast tray  and PT at bedside for evaluation  pt  at bedside pending bed assignment Kena
[address] [facility]  [id number] called  for pt requesting stool softener OK to give sotalol as per order Pt also given Miralax to take when she feels she needs to will  continue to monitor Mpulern
1626 Pt admitted and had a bed assignment but pt to be d/c home Naila np down to talk to the pt and d/c paper-work printed for discharge and given to pt walked out on her own accord and verbalized understanding of follow up Kena
2397 Lunch tray provided denies c/o at this time Darius
Received pt at 7am, on observation for left sided numbness. pt aox3, no complaints of pain, comfort and safety maintained, Cardiac monitor connected to patient. pending an MRI exam. will continue to monitor

## 2022-11-06 NOTE — DISCHARGE NOTE PROVIDER - HOSPITAL COURSE
51 y/o right handed female with valve transplant, a fib (on coumadin), Left M2 occlusion s/p thrombectomy presenting a code stroke LKW 8:29AM, for left arm tingling/numbness that began at 8:30 AM abruptly. Patient has residual focal deficits from prior left MCA infarct, which include mild right sided weakness (no drift on stroke exam), mild right facial asymmetry, and dysarthria. NIHSS 2. mRS 2, patient needs help bathing and dressing herself since her right hand is contracted, but overall independent and able to walk on her own. CTH noted with worsening of of L MCA stroke symptoms, worsening R facial and decrease R  strength in setting of subtherapeutic INR.  was 2.1 supposed to be 2.5-3.5 ; stroke 2/2 subtherapeutic INR. Patient seen by Neuro/Cards, patient to c/w coumadin INR goal 2.5-3. and high dose statin therapy LDL goal < 70 ; now INR at goal. Patient is cleared for discharge home with Cards and neuro follow up. Patient to have INR checked in 2 days.

## 2022-11-08 ENCOUNTER — APPOINTMENT (OUTPATIENT)
Dept: ELECTROPHYSIOLOGY | Facility: CLINIC | Age: 52
End: 2022-11-08

## 2022-11-08 ENCOUNTER — NON-APPOINTMENT (OUTPATIENT)
Age: 52
End: 2022-11-08

## 2022-11-08 VITALS
SYSTOLIC BLOOD PRESSURE: 138 MMHG | RESPIRATION RATE: 14 BRPM | OXYGEN SATURATION: 100 % | WEIGHT: 167 LBS | BODY MASS INDEX: 26.84 KG/M2 | DIASTOLIC BLOOD PRESSURE: 81 MMHG | HEIGHT: 66 IN | HEART RATE: 60 BPM

## 2022-11-08 DIAGNOSIS — Z95.2 PRESENCE OF PROSTHETIC HEART VALVE: ICD-10-CM

## 2022-11-08 DIAGNOSIS — I48.91 UNSPECIFIED ATRIAL FIBRILLATION: ICD-10-CM

## 2022-11-08 DIAGNOSIS — I63.9 CEREBRAL INFARCTION, UNSPECIFIED: ICD-10-CM

## 2022-11-08 PROCEDURE — 99215 OFFICE O/P EST HI 40 MIN: CPT

## 2022-11-08 PROCEDURE — 93000 ELECTROCARDIOGRAM COMPLETE: CPT

## 2022-11-08 RX ORDER — ATORVASTATIN CALCIUM 80 MG/1
80 TABLET, FILM COATED ORAL
Qty: 90 | Refills: 0 | Status: DISCONTINUED | COMMUNITY
Start: 2022-10-28

## 2022-11-08 RX ORDER — TIZANIDINE 2 MG/1
2 TABLET ORAL EVERY 6 HOURS
Qty: 24 | Refills: 1 | Status: DISCONTINUED | COMMUNITY
Start: 2022-05-02 | End: 2022-11-08

## 2022-11-08 RX ORDER — AMOXICILLIN 500 MG/1
500 TABLET, FILM COATED ORAL
Qty: 4 | Refills: 0 | Status: DISCONTINUED | COMMUNITY
Start: 2022-11-01

## 2022-11-08 RX ORDER — ATORVASTATIN CALCIUM 80 MG/1
80 TABLET, FILM COATED ORAL
Qty: 90 | Refills: 3 | Status: ACTIVE | COMMUNITY

## 2022-11-08 RX ORDER — APIXABAN 5 MG/1
5 TABLET, FILM COATED ORAL
Qty: 60 | Refills: 0 | Status: DISCONTINUED | COMMUNITY
Start: 2022-10-03

## 2022-11-08 RX ORDER — WARFARIN 3 MG/1
3 TABLET ORAL
Qty: 30 | Refills: 0 | Status: ACTIVE | COMMUNITY
Start: 2022-11-06

## 2022-11-08 RX ORDER — CLOBETASOL PROPIONATE 0.5 MG/G
0.05 CREAM TOPICAL
Qty: 60 | Refills: 0 | Status: DISCONTINUED | COMMUNITY
Start: 2022-05-26

## 2022-11-08 RX ORDER — CYCLOBENZAPRINE HYDROCHLORIDE 5 MG/1
5 TABLET, FILM COATED ORAL 3 TIMES DAILY
Qty: 21 | Refills: 0 | Status: DISCONTINUED | COMMUNITY
Start: 2022-05-04 | End: 2022-11-08

## 2022-11-08 RX ORDER — ACETAMINOPHEN AND CODEINE 300; 30 MG/1; MG/1
300-30 TABLET ORAL 3 TIMES DAILY
Qty: 15 | Refills: 0 | Status: DISCONTINUED | COMMUNITY
Start: 2022-05-04 | End: 2022-11-08

## 2022-11-08 RX ORDER — WARFARIN 2.5 MG/1
2.5 TABLET ORAL
Qty: 30 | Refills: 0 | Status: DISCONTINUED | COMMUNITY
Start: 2022-10-25 | End: 2022-11-08

## 2022-11-08 RX ORDER — RIVAROXABAN 20 MG/1
20 TABLET, FILM COATED ORAL
Qty: 30 | Refills: 2 | Status: DISCONTINUED | COMMUNITY
Start: 2021-05-11 | End: 2022-11-08

## 2022-11-08 NOTE — DISCUSSION/SUMMARY
[FreeTextEntry1] : Despite Sotalol she is still  having a few breakthroughs, though she reverts to sinus\par I suggested that in addition to  keeping the INR in the 2.5-3.0 range, that she add 81 mg ASA. We briefly discussed the option of RICHY ie; Watchman vs Amulet. Limited data on efficacy in the setting of previous RHD and she should remain on warfarin anyway . I am reluctant to recommend ablation for AF at this time given her recent CVA. She may benefit from home INR monitoring and it seems that she and her family would be able to perform this. I have asked her to take 20 mg pepcid while taking warfarin and ASA. ECG today shows sinus with a QTc of 439. She may be able to tolerate up to 120 mg bid but I would get a ZIO first to assess her sinus rates on the current dose, first.  [EKG obtained to assist in diagnosis and management of assessed problem(s)] : EKG obtained to assist in diagnosis and management of assessed problem(s)

## 2022-11-08 NOTE — HISTORY OF PRESENT ILLNESS
[FreeTextEntry1] : 53 y/o right handed female with bioprosthetic mitral  valve replacement (1991 and redo 2018 by Dr. Vieyra, but no atri-clip) for RHD, who has PAF currently managed with sotalol.  She has had two strokes, one while on Xarelto, the other on warfarin with an INR or 2.1. She has not been on concurrent aspirin. Strokes have been a Left M2\par occlusion s/p thrombectomy and then per previous notes:\par  " presenting a code stroke for left arm tingling/numbness that began at 8:30 AM abruptly. Patient has residual focal deficits from prior left MCA infarct, which include mild right sided weakness(no drift on stroke exam), mild right facial asymmetry, and dysarthria. NIHSS 2. mRS 2, patient needs help bathing and dressing herself since her right hand is contracted, but overall independent and able to walk on her own. CTH noted with worsening of MCA stroke symptoms, worsening R facial and decrease R  strength in setting of subtherapeutic INR.  was 2.1 supposed to be 2.5-3.5 and stroke felt secondary to  subtherapeutic INR. Patient seen by Neuro/Cards, patient to c/w coumadin INR goal 2.5-3. and high dose statin therapy LDL goal < 70 ; now INR\par at goal. Patient is cleared for discharge home with Cards and neuro follow up. Patient to have INR checked in 2 days."   Family history of diabetes, HTN mother, grandmother stroke. \par \par The patient has a moderate expressive dysarthria. She continues to have intermittent PAF on sotalol. She is in sinus rhythm today.\par  \par

## 2022-11-08 NOTE — REVIEW OF SYSTEMS
[Palpitations] : palpitations [Negative] : Heme/Lymph [FreeTextEntry9] : right hand weakness [de-identified] : dysarthria

## 2022-11-26 NOTE — ED ADULT NURSE NOTE - NSFALLRSKINDICATORS_ED_ALL_ED
Patient presents with:  Ear Problem      Clinical Decision Making:  Patient experiencing right ear pain.  Ear exam appears normal.  Suspect viral sinusitis secondary to likely influenza.  We discussed supportive cares.      ICD-10-CM    1. Congestion of paranasal sinus  R09.81           Patient Instructions   1. Continue Sudafed. Start Claritin, Flonase, you can also use Afrin (3 days only)   2. Follow up if no improvement in 4 days.       HPI:  Genoveva Gregory is a 43 year old female who presents today complaining of right ear and right upper teeth pain x 1 day. Sick sxs 3 days.  Patient's family has influenza A.    History obtained from the patient.    Problem List:  There are no relevant problems documented for this patient.      Past Medical History:   Diagnosis Date     Allergic rhinitis, cause unspecified      Unspecified asthma(493.90)        Social History     Tobacco Use     Smoking status: Never     Smokeless tobacco: Never   Substance Use Topics     Alcohol use: Yes     Comment: rare       Review of Systems    Vitals:    11/25/22 1921   BP: (!) 144/78   Pulse: 108   Resp: 18   Temp: 99.2  F (37.3  C)   TempSrc: Oral   SpO2: 98%   Weight: 110.9 kg (244 lb 8 oz)       Physical Exam  Vitals and nursing note reviewed.   Constitutional:       General: She is not in acute distress.     Appearance: She is not toxic-appearing or diaphoretic.   HENT:      Head: Normocephalic and atraumatic.      Comments: No TMJ tenderness.     Right Ear: Tympanic membrane, ear canal and external ear normal.      Left Ear: Tympanic membrane, ear canal and external ear normal.      Nose: Congestion present. No rhinorrhea.      Mouth/Throat:      Pharynx: No oropharyngeal exudate or posterior oropharyngeal erythema.   Eyes:      Conjunctiva/sclera: Conjunctivae normal.   Cardiovascular:      Rate and Rhythm: Normal rate and regular rhythm.      Heart sounds: No murmur heard.  Pulmonary:      Effort: Pulmonary effort is normal. No  respiratory distress.   Neurological:      Mental Status: She is alert.   Psychiatric:         Mood and Affect: Mood normal.         Behavior: Behavior normal.         Thought Content: Thought content normal.         Judgment: Judgment normal.       At the end of the encounter, I discussed results, diagnosis, medications. Discussed red flags for immediate return to clinic/ER, as well as indications for follow up if no improvement. Patient understood and agreed to plan. Patient was stable for discharge.   no

## 2023-01-03 ENCOUNTER — APPOINTMENT (OUTPATIENT)
Dept: ELECTROPHYSIOLOGY | Facility: CLINIC | Age: 53
End: 2023-01-03

## 2023-01-05 ENCOUNTER — APPOINTMENT (OUTPATIENT)
Dept: ELECTROPHYSIOLOGY | Facility: CLINIC | Age: 53
End: 2023-01-05

## 2023-01-12 ENCOUNTER — NON-APPOINTMENT (OUTPATIENT)
Age: 53
End: 2023-01-12

## 2023-01-12 ENCOUNTER — APPOINTMENT (OUTPATIENT)
Dept: ELECTROPHYSIOLOGY | Facility: CLINIC | Age: 53
End: 2023-01-12
Payer: COMMERCIAL

## 2023-01-12 VITALS
WEIGHT: 162 LBS | DIASTOLIC BLOOD PRESSURE: 70 MMHG | HEIGHT: 66 IN | BODY MASS INDEX: 26.03 KG/M2 | OXYGEN SATURATION: 99 % | SYSTOLIC BLOOD PRESSURE: 105 MMHG | HEART RATE: 52 BPM

## 2023-01-12 PROCEDURE — 93000 ELECTROCARDIOGRAM COMPLETE: CPT

## 2023-01-12 PROCEDURE — 99214 OFFICE O/P EST MOD 30 MIN: CPT

## 2023-01-12 RX ORDER — METOPROLOL TARTRATE 25 MG/1
25 TABLET, FILM COATED ORAL
Qty: 30 | Refills: 0 | Status: ACTIVE | COMMUNITY

## 2023-03-21 NOTE — PHYSICAL EXAM
[Well Developed] : well developed [Well Nourished] : well nourished [No Acute Distress] : no acute distress [Normal Conjunctiva] : normal conjunctiva [Normal Venous Pressure] : normal venous pressure [No Carotid Bruit] : no carotid bruit [Normal S1, S2] : normal S1, S2 [No Murmur] : no murmur [No Rub] : no rub [No Gallop] : no gallop [Clear Lung Fields] : clear lung fields [Good Air Entry] : good air entry [No Respiratory Distress] : no respiratory distress  [Soft] : abdomen soft [Non Tender] : non-tender [No Masses/organomegaly] : no masses/organomegaly [Normal Bowel Sounds] : normal bowel sounds [Normal Gait] : normal gait [No Edema] : no edema [No Cyanosis] : no cyanosis [No Clubbing] : no clubbing [No Varicosities] : no varicosities [No Rash] : no rash [No Skin Lesions] : no skin lesions [Alert and Oriented] : alert and oriented [Normal memory] : normal memory [de-identified] : mild right facial asymmetry  [de-identified] : moderate expressive dysarthria

## 2023-03-21 NOTE — DISCUSSION/SUMMARY
[FreeTextEntry1] : Given her relatively young age, we discussed AF ablation procedure  to keep her in sinus rhythm. However, given her previous (but recent) CVAs prior to ASA initiation (added to warfarin), I would like to schedule the procedure sometime in March. I explained that she will remain on Warfarin post procedure in the setting of [facility].EKG today shows sinus rhythm at 52 bpm with mildly abnormal T waves. She is at moderate risk and should have a DONOVAN on the table prior to proceeding with LA access given her history of strokes. [EKG obtained to assist in diagnosis and management of assessed problem(s)] : EKG obtained to assist in diagnosis and management of assessed problem(s)

## 2023-04-10 ENCOUNTER — APPOINTMENT (OUTPATIENT)
Dept: CV DIAGNOSITCS | Facility: HOSPITAL | Age: 53
End: 2023-04-10

## 2023-05-05 NOTE — DISCHARGE NOTE NURSING/CASE MANAGEMENT/SOCIAL WORK - NSDCPEPT PROEDMA_GEN_ALL_CORE
Yes [Negative] : Heme/Lymph [Fatigue] : fatigue [Night Sweats] : night sweats [Recent Change In Weight] : ~T recent weight change [Abdominal Pain] : abdominal pain [Nausea] : nausea [Heartburn] : heartburn [Anxiety] : anxiety [Depression] : depression

## 2023-05-24 NOTE — ED PROVIDER NOTE - PROGRESS NOTE DETAILS
Reached out to patient regarding receiving cardiologist clearance letter for us to ensure that she's cleared for her surgery tomorrow. She stated that she's been in contact with her cardiologist, Dr. Sierra regarding sending us the clearance. Informed that we hadn't received it.  Have been communicating with same day surgery center [phone] regarding the cardiac clearance and confirmed that they hadn't received it as well. She provided Dr. Sierra's nurse's number ([phone]), Aracelis, so that we can receive the clearance.    Received patient's call back who stated that cardiologist office sent out clearance to same day surgery.  Called same day surgery, who did confirm they received clearance from cardiologist. No further documentation or clearance needed.   Pt to be transferred to [facility] to higher level of care and treatment. pt with IV contrast allergy listed, ordered premedication. CT scans completed, no loren bleed, Dr. KEATON Guzman to discuss with attending.

## 2023-06-01 ENCOUNTER — APPOINTMENT (OUTPATIENT)
Dept: ELECTROPHYSIOLOGY | Facility: CLINIC | Age: 53
End: 2023-06-01

## 2023-08-04 ENCOUNTER — OUTPATIENT (OUTPATIENT)
Dept: OUTPATIENT SERVICES | Facility: HOSPITAL | Age: 53
LOS: 1 days | End: 2023-08-04
Payer: MEDICAID

## 2023-08-04 ENCOUNTER — APPOINTMENT (OUTPATIENT)
Dept: ULTRASOUND IMAGING | Facility: CLINIC | Age: 53
End: 2023-08-04
Payer: MEDICAID

## 2023-08-04 DIAGNOSIS — Z00.8 ENCOUNTER FOR OTHER GENERAL EXAMINATION: ICD-10-CM

## 2023-08-04 DIAGNOSIS — Z98.890 OTHER SPECIFIED POSTPROCEDURAL STATES: Chronic | ICD-10-CM

## 2023-08-04 DIAGNOSIS — Z95.2 PRESENCE OF PROSTHETIC HEART VALVE: Chronic | ICD-10-CM

## 2023-08-04 DIAGNOSIS — Z98.89 OTHER SPECIFIED POSTPROCEDURAL STATES: Chronic | ICD-10-CM

## 2023-08-04 PROCEDURE — 76700 US EXAM ABDOM COMPLETE: CPT

## 2023-08-04 PROCEDURE — 76700 US EXAM ABDOM COMPLETE: CPT | Mod: 26

## 2024-01-25 NOTE — OCCUPATIONAL THERAPY INITIAL EVALUATION ADULT - VISUAL ASSESSMENT: CONVERGENCE
FORM:  [employer]-Prescriber Response Form    Left By:  Fax    Instructions:  Please complete form and fax back to [phone]    To Be Returned By:  asap    For Further Information call [phone]    Fax placed in Dr. Sandy's tray   normal

## 2024-08-14 ENCOUNTER — NON-APPOINTMENT (OUTPATIENT)
Age: 54
End: 2024-08-14

## 2024-09-24 DIAGNOSIS — Z12.39 ENCOUNTER FOR OTHER SCREENING FOR MALIGNANT NEOPLASM OF BREAST: ICD-10-CM

## 2024-09-24 DIAGNOSIS — Z12.11 ENCOUNTER FOR SCREENING FOR MALIGNANT NEOPLASM OF COLON: ICD-10-CM

## 2024-09-27 ENCOUNTER — APPOINTMENT (OUTPATIENT)
Dept: OBGYN | Facility: CLINIC | Age: 54
End: 2024-09-27
Payer: MEDICAID

## 2024-09-27 VITALS
DIASTOLIC BLOOD PRESSURE: 76 MMHG | BODY MASS INDEX: 20.89 KG/M2 | SYSTOLIC BLOOD PRESSURE: 122 MMHG | HEIGHT: 66 IN | WEIGHT: 130 LBS | HEART RATE: 56 BPM

## 2024-09-27 DIAGNOSIS — R92.30 DENSE BREASTS, UNSPECIFIED: ICD-10-CM

## 2024-09-27 DIAGNOSIS — Z01.419 ENCOUNTER FOR GYNECOLOGICAL EXAMINATION (GENERAL) (ROUTINE) W/OUT ABNORMAL FINDINGS: ICD-10-CM

## 2024-09-27 DIAGNOSIS — Z13.820 ENCOUNTER FOR SCREENING FOR OSTEOPOROSIS: ICD-10-CM

## 2024-09-27 DIAGNOSIS — Z86.79 PERSONAL HISTORY OF OTHER DISEASES OF THE CIRCULATORY SYSTEM: ICD-10-CM

## 2024-09-27 DIAGNOSIS — Z86.73 PERSONAL HISTORY OF TRANSIENT ISCHEMIC ATTACK (TIA), AND CEREBRAL INFARCTION W/OUT RESIDUAL DEFICITS: ICD-10-CM

## 2024-09-27 PROCEDURE — 99386 PREV VISIT NEW AGE 40-64: CPT

## 2024-09-27 NOTE — PLAN
[FreeTextEntry1] : Wellness exam. Normal physical examination. Recommendations: Mammography with bilateral breast ultrasound, screening colonoscopy, bone density scan, ophthalmological, dental, and dermatological examination.   Family history of breast, colon and prostate cancer.  Information given on genetic testing.    Discussed proper nutrition and physical exercise. Reviewed age-appropriate vaccinations.

## 2024-09-27 NOTE — PHYSICAL EXAM
[Appropriately responsive] : appropriately responsive [Alert] : alert [Soft] : soft [Non-tender] : non-tender [Non-distended] : non-distended [No HSM] : No HSM [No Mass] : no mass [FreeTextEntry3] : The thyroid is nonenlarged and nontender.  There is no goiter or lymphadenopathy [Vulvar Atrophy] : vulvar atrophy [Labia Majora] : normal [Labia Minora] : normal [Normal] : normal [FreeTextEntry5] : A Pap smear was obtained.  There was no cervical motion tenderness. [FreeTextEntry6] : Bimanual examination is notable for a small, not tender uterus.  There were no palpable adnexal masses or adnexal tenderness. [FreeTextEntry9] : No lesions.  No palpable masses.

## 2024-09-27 NOTE — HISTORY OF PRESENT ILLNESS
[TextBox_4] : -year-old  3 para 2012 postmenopausal presents for an annual examination. Review of systems are negative. [PGHxTotal] : 3 [[facility] 2 [PGHxABSpont] : 1

## 2024-09-30 LAB — HPV HIGH+LOW RISK DNA PNL CVX: NOT DETECTED

## 2024-10-02 ENCOUNTER — RESULT REVIEW (OUTPATIENT)
Age: 54
End: 2024-10-02

## 2024-10-02 ENCOUNTER — APPOINTMENT (OUTPATIENT)
Dept: ULTRASOUND IMAGING | Facility: CLINIC | Age: 54
End: 2024-10-02
Payer: MEDICAID

## 2024-10-02 ENCOUNTER — APPOINTMENT (OUTPATIENT)
Dept: RADIOLOGY | Facility: CLINIC | Age: 54
End: 2024-10-02
Payer: MEDICAID

## 2024-10-02 ENCOUNTER — APPOINTMENT (OUTPATIENT)
Dept: MAMMOGRAPHY | Facility: CLINIC | Age: 54
End: 2024-10-02
Payer: MEDICAID

## 2024-10-02 ENCOUNTER — OUTPATIENT (OUTPATIENT)
Dept: OUTPATIENT SERVICES | Facility: HOSPITAL | Age: 54
LOS: 1 days | End: 2024-10-02
Payer: MEDICAID

## 2024-10-02 DIAGNOSIS — Z12.39 ENCOUNTER FOR OTHER SCREENING FOR MALIGNANT NEOPLASM OF BREAST: ICD-10-CM

## 2024-10-02 DIAGNOSIS — Z95.2 PRESENCE OF PROSTHETIC HEART VALVE: Chronic | ICD-10-CM

## 2024-10-02 DIAGNOSIS — Z98.89 OTHER SPECIFIED POSTPROCEDURAL STATES: Chronic | ICD-10-CM

## 2024-10-02 DIAGNOSIS — Z98.890 OTHER SPECIFIED POSTPROCEDURAL STATES: Chronic | ICD-10-CM

## 2024-10-02 PROCEDURE — 76641 ULTRASOUND BREAST COMPLETE: CPT | Mod: 26,50

## 2024-10-02 PROCEDURE — 77063 BREAST TOMOSYNTHESIS BI: CPT | Mod: 26

## 2024-10-02 PROCEDURE — 77063 BREAST TOMOSYNTHESIS BI: CPT

## 2024-10-02 PROCEDURE — 77067 SCR MAMMO BI INCL CAD: CPT | Mod: 26

## 2024-10-02 PROCEDURE — 77080 DXA BONE DENSITY AXIAL: CPT | Mod: 26

## 2024-10-02 PROCEDURE — 76641 ULTRASOUND BREAST COMPLETE: CPT

## 2024-10-02 PROCEDURE — 77067 SCR MAMMO BI INCL CAD: CPT

## 2024-10-02 PROCEDURE — 77080 DXA BONE DENSITY AXIAL: CPT

## 2024-10-04 LAB — CYTOLOGY CVX/VAG DOC THIN PREP: NORMAL

## 2024-10-07 ENCOUNTER — APPOINTMENT (OUTPATIENT)
Dept: OBGYN | Facility: CLINIC | Age: 54
End: 2024-10-07

## 2024-10-07 ENCOUNTER — APPOINTMENT (OUTPATIENT)
Dept: OBGYN | Facility: CLINIC | Age: 54
End: 2024-10-07
Payer: MEDICAID

## 2024-10-07 VITALS — DIASTOLIC BLOOD PRESSURE: 70 MMHG | SYSTOLIC BLOOD PRESSURE: 107 MMHG | HEART RATE: 70 BPM

## 2024-10-07 PROCEDURE — 36415 COLL VENOUS BLD VENIPUNCTURE: CPT

## 2024-12-11 ENCOUNTER — NON-APPOINTMENT (OUTPATIENT)
Age: 54
End: 2024-12-11

## 2024-12-16 ENCOUNTER — APPOINTMENT (OUTPATIENT)
Dept: PULMONOLOGY | Facility: CLINIC | Age: 54
End: 2024-12-16
Payer: MEDICAID

## 2024-12-16 VITALS
RESPIRATION RATE: 16 BRPM | DIASTOLIC BLOOD PRESSURE: 78 MMHG | SYSTOLIC BLOOD PRESSURE: 128 MMHG | OXYGEN SATURATION: 100 % | HEART RATE: 66 BPM

## 2024-12-16 DIAGNOSIS — Z01.812 ENCOUNTER FOR PREPROCEDURAL LABORATORY EXAMINATION: ICD-10-CM

## 2024-12-16 DIAGNOSIS — I48.91 UNSPECIFIED ATRIAL FIBRILLATION: ICD-10-CM

## 2024-12-16 LAB — POCT - HEMOGLOBIN (HGB), QUANTITATIVE, TRANSCUTANEOUS: 8.8

## 2024-12-16 PROCEDURE — 71046 X-RAY EXAM CHEST 2 VIEWS: CPT

## 2024-12-16 PROCEDURE — 94010 BREATHING CAPACITY TEST: CPT

## 2024-12-16 PROCEDURE — 88738 HGB QUANT TRANSCUTANEOUS: CPT

## 2024-12-16 PROCEDURE — 94729 DIFFUSING CAPACITY: CPT

## 2024-12-16 PROCEDURE — 99204 OFFICE O/P NEW MOD 45 MIN: CPT | Mod: 25

## 2024-12-16 PROCEDURE — 94727 GAS DIL/WSHOT DETER LNG VOL: CPT

## 2024-12-16 PROCEDURE — ZZZZZ: CPT

## 2024-12-16 RX ORDER — ASPIRIN 81 MG
81 TABLET, DELAYED RELEASE (ENTERIC COATED) ORAL
Refills: 0 | Status: ACTIVE | COMMUNITY

## 2025-01-03 ENCOUNTER — APPOINTMENT (OUTPATIENT)
Dept: PULMONOLOGY | Facility: CLINIC | Age: 55
End: 2025-01-03

## 2025-01-20 ENCOUNTER — APPOINTMENT (OUTPATIENT)
Dept: PULMONOLOGY | Facility: CLINIC | Age: 55
End: 2025-01-20

## 2025-03-24 ENCOUNTER — APPOINTMENT (OUTPATIENT)
Dept: ORTHOPEDIC SURGERY | Facility: CLINIC | Age: 55
End: 2025-03-24

## 2025-05-27 NOTE — H&P ADULT - ASSESSMENT
50 yo female with PMHx of CVA (10y ago with no residual), s/p right knee surgery, MR 2/2 rheumatic heart disease, s/p MV repair (1991), and s/p redosternotomy MVR (T) in 1/23/18, AF diagnosed 2/2018 on Xarelto, s/p multiple DCCV most recently on 1/28/22.  w/ palps and found to be in rapid A FIB  SOTALOL as per ER TEAM  TSH  TELE  HOLD B BLOCKERS  C/W XARELTO  CARDIOVERSION IN AM IF NOT CONVERTED   
No

## 2025-05-28 ENCOUNTER — NON-APPOINTMENT (OUTPATIENT)
Age: 55
End: 2025-05-28

## 2025-05-30 ENCOUNTER — APPOINTMENT (OUTPATIENT)
Dept: ORTHOPEDIC SURGERY | Facility: CLINIC | Age: 55
End: 2025-05-30
Payer: COMMERCIAL

## 2025-05-30 ENCOUNTER — NON-APPOINTMENT (OUTPATIENT)
Age: 55
End: 2025-05-30

## 2025-05-30 VITALS — WEIGHT: 130 LBS | BODY MASS INDEX: 20.89 KG/M2 | HEIGHT: 66 IN

## 2025-05-30 DIAGNOSIS — M75.01 ADHESIVE CAPSULITIS OF RIGHT SHOULDER: ICD-10-CM

## 2025-05-30 DIAGNOSIS — M25.511 PAIN IN RIGHT SHOULDER: ICD-10-CM

## 2025-05-30 PROCEDURE — 99204 OFFICE O/P NEW MOD 45 MIN: CPT | Mod: 25

## 2025-05-30 PROCEDURE — 20610 DRAIN/INJ JOINT/BURSA W/O US: CPT | Mod: RT

## 2025-05-30 PROCEDURE — 73030 X-RAY EXAM OF SHOULDER: CPT | Mod: RT

## 2025-06-13 ENCOUNTER — APPOINTMENT (OUTPATIENT)
Dept: ORTHOPEDIC SURGERY | Facility: CLINIC | Age: 55
End: 2025-06-13
Payer: COMMERCIAL

## 2025-06-13 VITALS — WEIGHT: 140 LBS | BODY MASS INDEX: 22.5 KG/M2 | HEIGHT: 66 IN

## 2025-06-13 PROCEDURE — 99214 OFFICE O/P EST MOD 30 MIN: CPT

## 2025-07-21 NOTE — CONSULT NOTE ADULT - CONSULT REQUESTED BY NAME
Pt arrives in triage from home via PV cc abd pain and palpitations for a few days     Pt reports she has not been able to keep anything down for three days     HR in triage is 157BPM    Aox4 and ambulatory but at 90 degrees and unable to stand straight up  
ED